# Patient Record
Sex: FEMALE | Race: WHITE | Employment: OTHER | URBAN - METROPOLITAN AREA
[De-identification: names, ages, dates, MRNs, and addresses within clinical notes are randomized per-mention and may not be internally consistent; named-entity substitution may affect disease eponyms.]

---

## 2017-11-14 PROBLEM — E08.40 DIABETES MELLITUS DUE TO UNDERLYING CONDITION WITH DIABETIC NEUROPATHY, WITHOUT LONG-TERM CURRENT USE OF INSULIN (HCC): Status: ACTIVE | Noted: 2017-11-14

## 2017-11-14 PROBLEM — J30.89 ENVIRONMENTAL AND SEASONAL ALLERGIES: Status: ACTIVE | Noted: 2017-11-14

## 2017-11-14 PROBLEM — E11.9 DIABETES MELLITUS WITHOUT COMPLICATION (HCC): Status: ACTIVE | Noted: 2017-11-14

## 2017-12-05 PROBLEM — G62.9 PERIPHERAL POLYNEUROPATHY: Status: ACTIVE | Noted: 2017-12-05

## 2017-12-05 PROBLEM — E66.9 OBESITY (BMI 30-39.9): Status: ACTIVE | Noted: 2017-12-05

## 2017-12-05 PROBLEM — G25.81 RESTLESS LEG SYNDROME: Status: ACTIVE | Noted: 2017-12-05

## 2018-06-11 PROBLEM — M15.9 PRIMARY OSTEOARTHRITIS INVOLVING MULTIPLE JOINTS: Status: ACTIVE | Noted: 2018-06-11

## 2018-11-19 PROBLEM — J45.20 MILD INTERMITTENT ASTHMA WITHOUT COMPLICATION: Status: ACTIVE | Noted: 2018-11-19

## 2019-03-25 PROBLEM — L43.9 LICHEN PLANUS: Status: ACTIVE | Noted: 2019-03-25

## 2019-03-25 PROBLEM — E66.01 SEVERE OBESITY (HCC): Status: ACTIVE | Noted: 2019-03-25

## 2019-07-22 PROBLEM — E08.40 DIABETES MELLITUS DUE TO UNDERLYING CONDITION WITH DIABETIC NEUROPATHY, WITHOUT LONG-TERM CURRENT USE OF INSULIN (HCC): Status: RESOLVED | Noted: 2017-11-14 | Resolved: 2019-07-22

## 2019-07-22 PROBLEM — E11.40 CONTROLLED TYPE 2 DIABETES WITH NEUROPATHY (HCC): Status: ACTIVE | Noted: 2019-07-22

## 2019-07-22 PROBLEM — E11.9 DIABETES MELLITUS WITHOUT COMPLICATION (HCC): Status: RESOLVED | Noted: 2017-11-14 | Resolved: 2019-07-22

## 2019-09-24 PROBLEM — F33.0 MILD EPISODE OF RECURRENT MAJOR DEPRESSIVE DISORDER (HCC): Status: ACTIVE | Noted: 2019-09-24

## 2019-09-24 PROBLEM — F51.04 PSYCHOPHYSIOLOGICAL INSOMNIA: Status: ACTIVE | Noted: 2019-09-24

## 2019-09-24 PROBLEM — F41.1 GAD (GENERALIZED ANXIETY DISORDER): Status: ACTIVE | Noted: 2019-09-24

## 2019-09-24 PROBLEM — G47.33 OBSTRUCTIVE SLEEP APNEA SYNDROME: Status: ACTIVE | Noted: 2019-09-24

## 2019-10-10 PROBLEM — K59.09 CHRONIC CONSTIPATION: Status: ACTIVE | Noted: 2019-10-10

## 2019-10-10 PROBLEM — K21.00 GASTROESOPHAGEAL REFLUX DISEASE WITH ESOPHAGITIS: Status: ACTIVE | Noted: 2019-10-10

## 2019-10-10 PROBLEM — R06.00 DYSPNEA: Status: ACTIVE | Noted: 2019-10-10

## 2019-11-06 PROBLEM — E11.40 TYPE 2 DIABETES MELLITUS WITH DIABETIC NEUROPATHY (HCC): Status: ACTIVE | Noted: 2019-11-06

## 2019-11-11 ENCOUNTER — HOSPITAL ENCOUNTER (OUTPATIENT)
Dept: PHYSICAL THERAPY | Age: 64
Discharge: HOME | End: 2019-11-11
Payer: MEDICARE

## 2019-11-11 DIAGNOSIS — G89.29 CHRONIC RIGHT-SIDED LOW BACK PAIN WITHOUT SCIATICA: ICD-10-CM

## 2019-11-11 DIAGNOSIS — M54.50 CHRONIC RIGHT-SIDED LOW BACK PAIN WITHOUT SCIATICA: ICD-10-CM

## 2019-11-11 PROCEDURE — 97162 PT EVAL MOD COMPLEX 30 MIN: CPT

## 2019-11-11 PROCEDURE — 97110 THERAPEUTIC EXERCISES: CPT

## 2019-11-11 NOTE — THERAPY EVALUATION
Vanessa Etienne  : 1955      [place] / [place] / Product Type: Medicare /  [address] at [address] [place] [address]. [facility], [address], [facility], [address] [place] [address]  [phone]   [phone]       OUTPATIENT PHYSICAL THERAPY:Initial Assessment 2019    ICD-10: Treatment Diagnosis:    Low back pain (M54.5)        Muscle weakness (generalized) (M62.81)  Difficulty in walking, not elsewhere classified (R26.2)     Precautions/Allergies:   Codeine; Dulaglutide; Hydrocodone; Hydrocodone-acetaminophen; Liraglutide; Morphine; and Tramadol   MD Orders: Eval and Treat  MEDICAL/REFERRING DIAGNOSIS:  Chronic right-sided low back pain without sciatica [M54.5, G89.29]   DATE OF ONSET: approx 2 weeks ago  REFERRING PHYSICIAN: Karen Brown MD  RETURN PHYSICIAN APPOINTMENT: TBD by patient     INITIAL ASSESSMENT:  Ms. Vanessa Etienne presents to physical therapy with decreased postural and hip/core strength, ROM, joint mobility, flexibility, functional mobility, and increased pain that had an insidious onset approx 2 months ago. Pt has a history of chronic LBP. Pt has negative results with SLR and prone knee bend, there are no radiating neurological symptoms upon examination. Pt is anxious and hesitant with therapy intervention secondary to previous experience a couple of years ago. Vanessa Etienne will benefit from skilled physical therapy (medically necessary) to address above deficits affecting participation in basic ADLs and functional mobility/tolerance. Patient will benefit from manual therapeutic techniques (stretching, joint mobilizations, soft tissue mobilization/myofascial release), therapeutic exercises and activities to promote postural control and strengthen, education on body mechanics, and comprehensive home exercises program to address current impairments and functional limitations.        PROBLEM LIST (Impacting functional limitations):  1. Decreased Strength  2. Decreased ADL/Functional Activities  3. Decreased Transfer Abilities  4. Decreased Ambulation Ability/Technique  5. Decreased Balance  6. Increased Pain  7. Decreased Activity Tolerance  8. Increased Fatigue  9. Increased Shortness of Breath  10. Decreased Flexibility/Joint Mobility  11. Decreased [place] with Home Exercise Program INTERVENTIONS PLANNED:  1. Balance Exercise  2. Bed Mobility  3. Cold  4. Cryotherapy  5. Electrical Stimulation  6. Family Education  7. Gait Training  8. Heat  9. Home Exercise Program (HEP)  10. Manual Therapy  11. Neuromuscular Re-education/Strengthening  12. Range of Motion (ROM)  13. Therapeutic Activites  14. Therapeutic Exercise/Strengthening  15. Transfer Training  16. Lumbar Traction   TREATMENT PLAN:  Effective Dates: 11/11/19 TO 1/10/2020 (60 days). Frequency/Duration: 2 times a week for 60 Days  GOALS: (Goals have been discussed and agreed upon with patient.)  Short Term Goals 30 days   1. Charleston Bernheim will be independent with HEP to maintain functional gains made with therapy intervention. 2. [place] Bernheim will participate in core stabilization exercises to help with stabilization during ADLs to decreased pain and improve body mechanics. [facility] Ellis will improve hamstring length by greater than or equal 10 degrees to promote dynamic balance and decrease the risk for falls. Long Term Goals 60 days  1. Charleston Bernheim will demonstrate a 8 point improvement on the Oswestry to show improvement in function and participation in household chores  2. [place] Bernheim will report 2/10 pain at rest and during ADLs to complete cooking tasks. [address] [facility] Medrano will demonstrate 4/5 hip abductor strength on manual muscle testing to promote stance limb control with gait and stair negotiation to prevent low back shear.   [address] Schilling will initiate a home walking program with less than or equal to 3/10 LBP to improve overall wellness and decrease pain with mobility. Rehabilitation Potential For Stated Goals: FAIR - sedentary lifestyle may have some fear avoidance behaviors. Outcome Measure: Tool Used: Modified Oswestry Low Back Pain Questionnaire  Score:  Initial: 19/50  Most Recent: X/50 (Date: -- )   Interpretation of Score: Each section is scored on a 0-5 scale, 5 representing the greatest disability. The scores of each section are added together for a total score of 50. Medical Necessity:   · Skilled intervention continues to be required due to above deficits affecting participation in basic ADLs and overall functional tolerance. Reason for Services/Other Comments:  · Patient continues to require skilled intervention due to  above deficits affecting participation in basic ADLs and overall functional tolerance. Total Duration: 40 minutes plus treatment  PT Patient Time In/Time Out  Time In: 0810  Time Out: 0900    Regarding Sagrario Christy's therapy, I certify that the treatment plan above will be carried out by a therapist or under their direction. Thank you for this referral,  Natalie Duarte PT     Referring Physician Signature: Troy Pollack MD              Date                    HISTORY:   History of Present Injury/Illness (Reason for Referral):    Pt reports approx 2 months ago she has onset of muscle spasm pain with no reported mechanism of injury. Pt has a chronic history of LBP, but reports this is a\" new pain\" pt reports burning sensation below waist on both sides and spasms going up between both shoulder blades. Pt reports these spasms come on 3-4 x a day and then she is unable to do anything. Note: Patient denies any saddle paresthesia or bowel/bladder deficits.      · Aggravating factors: standing, quick movement, sitting   · Relieving factors: analgesic cream, massage    -Present symptoms/complaints (on day of evaluation)  Pain Scale:  · Current: 4/10  · Best: 0/10  · Worst: 10/10      Level of Function/Work/Activity:  Current functional level: pt is sedentary, self limits acitivty, pt has help from  to wash back   Prior level of Function: pt reports she had started walking program and lifting light hand weights, household chores  Work/Hobbies: started a walking program several months ago    Previous Treatment Approaches:  None     Past Medical History/Comorbidities:   Ms. Chloé Doshi  has a past medical history of Anemia, Elevated liver enzymes, Exogenous obesity, Hypertension, Other and unspecified hyperlipidemia, Peripheral neuropathy, and Type II or unspecified type diabetes mellitus without mention of complication, not stated as uncontrolled. Ms. Chloé Doshi  has a past surgical history that includes hx hysterectomy (1997). Right rotator cuff tear per patient, R hip pain, history of foot surgery left. Arthritis in B knees and back    Social History/Living Environment:   Pt lives in a one level home with 4 steps to enter with left handrails.  Pt lives with     Dominant Side right  Other Clinical Tests:  maging: NO     Active Ambulatory Problems     Diagnosis Date Noted    Hypertension, essential, benign 10/28/2014    Hyperlipemia, mixed 10/28/2014    Elevated liver enzymes 10/28/2014    Allergic rhinitis 10/28/2014    Environmental and seasonal allergies 11/14/2017    Restless leg syndrome 12/05/2017    Peripheral polyneuropathy 12/05/2017    Obesity (BMI 30-39.9) 12/05/2017    BMI 38.0-38.9,adult 12/05/2017    Primary osteoarthritis involving multiple joints 06/11/2018    Mild intermittent asthma without complication 21/01/5853    Severe obesity ([address] 51/71/5198    Lichen planus 71/74/7885    Controlled type 2 diabetes with neuropathy ([address] 07/22/2019    Mild episode of recurrent major depressive disorder ([address] 09/24/2019    TESS (generalized anxiety disorder) 09/24/2019    Psychophysiological insomnia 09/24/2019    Obstructive sleep apnea syndrome 09/24/2019  Chronic constipation 10/10/2019    Dyspnea 10/10/2019    Gastroesophageal reflux disease with esophagitis 10/10/2019    Type 2 diabetes mellitus with diabetic neuropathy ([facility] 11/06/2019     Resolved Ambulatory Problems     Diagnosis Date Noted    Diabetes mellitus without complication ([facility] 15/92/6036    Diabetes mellitus due to underlying condition with diabetic neuropathy, without long-term current use of insulin ([facility] 11/14/2017     Past Medical History:   Diagnosis Date    Anemia     Exogenous obesity     Hypertension     Other and unspecified hyperlipidemia     Peripheral neuropathy     Type II or unspecified type diabetes mellitus without mention of complication, not stated as uncontrolled        Ambulatory/Rehab Services H2 Model Falls Risk Assessment   Risk Factors:       (2)  Symptomatic Depression       (1)  Dizziness/Vertigo       (1)  Visual Impairment [specify:  difficulty reading despite reading glasses] Ability to Rise from Chair:       (0)  Ability to rise in a single movement   Falls Prevention Plan:       Exercise/Equipment Adaptation (specify):  complete interventions with therapist supervision   Total: (5 or greater = High Risk): 4   ©2010 [facility] of [facility]. All Rights Reserved. [facility] Patent #[id number]. Federal Law prohibits the replication, distribution or use without written permission from [facility] of [address] [place] [address]   Current Medications:       Current Outpatient Medications:     varicella-zoster recombinant, PF, (SHINGRIX, PF,) 50 mcg/0.5 mL susr injection, 0.5mL by IntraMUSCular route once now and then repeat in 2-6 months, Disp: 0.5 mL, Rfl: 1    busPIRone (BUSPAR) 10 mg tablet, Take 1 Tab by mouth three (3) times daily. , Disp: 90 Tab, Rfl: 0    baclofen (LIORESAL) 20 mg tablet, TAKE ONE TABLET BY MOUTH EACH NIGHT AT BEDTIME, Disp: 30 Tab, Rfl: 0    traZODone (DESYREL) 100 mg tablet, Take 1 Tab by mouth nightly., Disp: 30 Tab, Rfl: 0    pantoprazole (PROTONIX) 40 mg tablet, Take 1 Tab by mouth daily. , Disp: 30 Tab, Rfl: 0    famotidine (PEPCID) 40 mg tablet, Take 1 Tab by mouth nightly., Disp: 30 Tab, Rfl: 0    insulin NPH (NOVOLIN N NPH U-100 INSULIN) 100 unit/mL injection, by SubCUTAneous route once., Disp: , Rfl:     ospemifene (OSPHENA) 60 mg tab tablet, Take 60 mg by mouth., Disp: , Rfl:     Omega-3 Fatty Acids 60- mg cpDR, Take  by mouth., Disp: , Rfl:     linaCLOtide (LINZESS) 145 mcg cap capsule, Take 1 Cap by mouth Daily (before breakfast). , Disp: 90 Cap, Rfl: 1    gabapentin (NEURONTIN) 400 mg capsule, Take 2 capsules po 3 times a day, Disp: 540 Cap, Rfl: 1    losartan (COZAAR) 100 mg tablet, Take 1 Tab by mouth daily. , Disp: 90 Tab, Rfl: 1    rOPINIRole (REQUIP) 1 mg tablet, Take 1 tab po twice a day, Disp: 180 Tab, Rfl: 1    rosuvastatin (CRESTOR) 20 mg tablet, TAKE ONE TABLET BY MOUTH ONCE DAILY. , Disp: 90 Tab, Rfl: 1    albuterol (PROVENTIL HFA, VENTOLIN HFA, PROAIR HFA) 90 mcg/actuation inhaler, Take 2 Puffs by inhalation every six (6) hours as needed for Wheezing., Disp: 1 Inhaler, Rfl: 2    fluticasone propion-salmeterol (ADVAIR HFA) 230-21 mcg/actuation inhaler, Take 2 Puffs by inhalation two (2) times a day., Disp: 1 Each, Rfl: 5    hydroxychloroquine (PLAQUENIL) 200 mg tablet, Take  by mouth., Disp: , Rfl:     glipiZIDE (GLUCOTROL) 5 mg tablet, Take 1 Tab by mouth two (2) times a day., Disp: 60 Tab, Rfl: 0    metFORMIN (GLUCOPHAGE) 1,000 mg tablet, Take 1 Tab by mouth two (2) times daily (with meals). , Disp: 180 Tab, Rfl: 1    aspirin delayed-release 81 mg tablet, Take 81 mg by mouth., Disp: , Rfl:     insulin regular (NOVOLIN R) 100 unit/mL injection, 30 units sub-cu TID per sliding scale, Disp: 1 Vial, Rfl: 5   Date Last Reviewed:  11/11/2019          Number of Personal Factors/Comorbidities that affect the Plan of Care: 1-2: MODERATE COMPLEXITY   EXAMINATION:   Observation/Orthostatic Postural Assessment: Standing: Forward Head  Rounded Shoulders  Thoracic Kyphosis        Sitting:  Forward Head  Rounded Shoulders  Thoracic Kyphosis    Palpation and Joint Mobility:   tenderness to  at L4, L1, T11, T7, T3, and C7    ROM:       AROM/PROM         Joint: Eval Date: 11/11/19  Re-Assess Date:  Re-Assess Date:    Active ROM RIGHT LEFT RIGHT LEFT RIGHT LEFT   Hip Flexion No restrictions No restrictions       Hip Extension 5 dg 5dg       Hip Internal Rotation No restrictions No restrictions       Knee Flexion No restrictions No restrictions       Knee Extension No restrictions No restrictions       Lumbar Flexion hypomobility hypomobility       Lumbar Extension hypomobility hypomobility       Lumbar Side-bending hypomobility hypomobility       Lumbar Rotation hypomobility hypomobility         Strength:     Eval Date: 11/11/19  Re-Assess Date:  Re-Assess Date:      RIGHT LEFT RIGHT LEFT RIGHT LEFT   Knee Flexion (L5-S2) 5/5 5/5       Knee Extension (L3, L4) 4+/5 4+/5       Hip Flexion (L1, L2) 4+/5 4+/5       Hip Extension 4+/5 4+/5       Hip Abduction (L5, S1) 3+/5 3+/5       Hip External Rotation 4/5 4/5       Ankle Dorsiflexion  4+/5 4/5       Strength:  0-5 scale, 0 no muscle contraction; 1 no joint motion but contraction felt; 2 -less than full ROM in gravity eliminated; 2 full ROM in grav eliminated; 2+ full ROM in grav eliminated and sharir to withstand minimal resist; 3-less than full ROM against gravity; 3 full ROM against gravity;  3+ full ROM against gravity and able to withstand minimal resist; 4- full ROM against gravity and able to withstand less than mod resist; 4 full ROM against gravity and able to withstand mod resist; 5 full ROM against gravity and able to withstand max resist.     Hamstring length from 90/90:   RIGHT LE: 40 dg   LEFT LE: 45 dg    Prone Rectus Femoris Length:              RIGHT LE: 120 dg   LEFT LE: 110 dg    Special Tests:   · BETTY: negative  · SLR (<60 degrees): negative  · Prone Knee bend: negative  Neurological Screen:    RADIATING SYMPTOMS?: NONE with  to all lumbar and thoracic segements, denies any changes in sensation with dermatome testing    Functional Mobility:  Affecting participation in basic ADLs and functional tasks. Gait/Ambulation:  No significant deficits, decreased speed, lack of trunk rotation        Transfers:  Not impaired        Bed Mobility:  Not impaired other than increased caution        Stairs:  Not assessed      Body Structures Involved:  1. Bones  2. Joints  3. Muscles  4. Ligaments Body Functions Affected:  1. Sensory/Pain  2. Neuromusculoskeletal  3. Movement Related Activities and Participation Affected:  1. Mobility  2.  Self Care   Number of elements that affect the Plan of Care: 3: MODERATE COMPLEXITY   CLINICAL PRESENTATION:   Presentation: Evolving clinical presentation with changing clinical characteristics: MODERATE COMPLEXITY   CLINICAL DECISION MAKING:      Use of outcome tool(s) and clinical judgement create a POC that gives a: Questionable prediction of patient's progress: MODERATE COMPLEXITY

## 2019-11-11 NOTE — PROGRESS NOTES
[facility]  : 1955  [place]  Secondary:  [facility] [address]  [facility]. [address] [address], [address] [address]  [phone]   [phone]    Visit Count:  1   OUTPATIENT PHYSICAL THERAPY:  Daily Treatment Note  2019   ICD-10: Treatment Diagnosis: Low back pain (M54.5)        Muscle weakness (generalized) (M62.81)  Difficulty in walking, not elsewhere classified (R26.2)     Precautions/Allergies:   Codeine; Dulaglutide; Hydrocodone; Hydrocodone-acetaminophen; Liraglutide; Morphine; and Tramadol   MD Orders: Eval andTreat MEDICAL/REFERRING DIAGNOSIS:  Chronic right-sided low back pain without sciatica [M54.5, G89.29]   DATE OF ONSET: approx 2 months ago  REFERRING PHYSICIAN: Jamie Cerna MD  RETURN PHYSICIAN APPOINTMENT: TBD by patient       Pre-treatment Symptoms/Complaints:  Please see eval  Pain: Initial:   4/10 LBP Post Session:  4/10   Medications Last Reviewed:  19  Updated Objective Findings:  Please see eval     TREATMENT:     THERAPEUTIC EXERCISE: (10 minutes):  Exercises per grid below to improve mobility, strength and balance. Required minimal visual, verbal, manual and tactile cues to promote proper body alignment, promote proper body posture and promote proper body mechanics. Progressed resistance, range, repetitions and complexity of movement as indicated. Date:  2019   Date:   Date:     Activity/Exercise Parameters Parameters Parameters   Education  Therapist provides skilled education on role of therapy, POC, and new HEP issued     Left/Right Lower trunk rotation 10x     Left/Right Clams 15x                               MANUAL THERAPY: (0 minutes): Joint mobilization and Soft tissue mobilization was utilized and necessary because of the patient's restricted joint motion and painful spasm.      [facility]  Treatment/Session Summary:    · Response to Treatment:  pt returns demo and HEP and in agreement with POC. .  · Communication/Consultation:  Eval sent to MD  · Equipment provided today:  pt provided with HEP        Recommendations/Intent for next treatment session: Next visit will focus on initiating core strength and stretching program.      Treatment Plan of Care Effective Dates:  11/11/19 to 1/10/20        Total Treatment Billable Duration:  10 min plus eval  PT Patient Time In/Time Out  Time In: 0810  Time Out: 0900  Chano Ortiz PT    Future Appointments   Date Time Provider Barrie Lynne   11/13/2019 11:15 AM Anna Clamp, PT [facility]   11/19/2019  8:00 AM Anna Clamp, PT [facility]   11/21/2019  1:45 PM Anna Clamp, PT [facility]   11/25/2019  8:00 AM Anna Clamp, PT [facility]   11/27/2019  1:45 PM Anna Clamp, PT [facility]   12/2/2019  8:00 AM Anna Clamp, PT [facility]   12/4/2019  8:45 AM Anna Clamp, PT [facility]   12/5/2019  7:45 AM Jamie Cerna MD [facility]   12/9/2019  8:00 AM Anna Clamp, PT [facility]   12/11/2019  8:00 AM Anna Clamp, PT [facility]   2/11/2020  9:20 AM Garett Jenkins MD [id number] [place]

## 2019-11-13 ENCOUNTER — HOSPITAL ENCOUNTER (OUTPATIENT)
Dept: PHYSICAL THERAPY | Age: 64
Discharge: HOME | End: 2019-11-13
Payer: MEDICARE

## 2019-11-13 PROCEDURE — 97110 THERAPEUTIC EXERCISES: CPT

## 2019-11-19 ENCOUNTER — HOSPITAL ENCOUNTER (OUTPATIENT)
Dept: PHYSICAL THERAPY | Age: 64
Discharge: HOME | End: 2019-11-19
Payer: MEDICARE

## 2019-11-19 PROCEDURE — 97110 THERAPEUTIC EXERCISES: CPT

## 2019-11-19 NOTE — PROGRESS NOTES
Avis Alford  : 1955  [place]  Secondary:  [facility] [address]  [address]. [address], [address]  [phone]   [phone]    Visit Count:  3   OUTPATIENT PHYSICAL THERAPY:  Daily Treatment Note  2019   ICD-10: Treatment Diagnosis: Low back pain (M54.5)        Muscle weakness (generalized) (M62.81)  Difficulty in walking, not elsewhere classified (R26.2)     Precautions/Allergies:   Codeine; Dulaglutide; Hydrocodone; Hydrocodone-acetaminophen; Liraglutide; Morphine; and Tramadol   MD Orders: Morena andTreat MEDICAL/REFERRING DIAGNOSIS:  Chronic right-sided low back pain without sciatica   DATE OF ONSET: approx 2 months ago  REFERRING PHYSICIAN: Evan Guevara MD  RETURN PHYSICIAN APPOINTMENT: TBD by patient       Pre-treatment Symptoms/Complaints: pt with no specific functional changes. Pt is having health issues with . Pain: Initial:   3/10 LBP Post Session:  3/10 LBP   Medications Last Reviewed:  19  Updated Objective Findings:  None today. Pt is highly private and prefers to not be in open gym when busy     TREATMENT:     THERAPEUTIC EXERCISE: (45 minutes):  Exercises per grid below to improve mobility, strength and balance. Required minimal visual, verbal, manual and tactile cues to promote proper body alignment, promote proper body posture and promote proper body mechanics. Progressed resistance, range, repetitions and complexity of movement as indicated. Date:  2019   Date:  19 Date:  19   Activity/Exercise Parameters Parameters Parameters   Education  Therapist provides skilled education on role of therapy, POC, and new HEP issued Therapist provides skilled education on keeping an activity log and recommendation for activity goals with use of RPE scale. Pt verbalizes understanding.     Left/Right Lower trunk rotation 10x 10 x 10 sec 10 x 10 sec   Left/Right Clams 15x 15x 2 x10 reps hooklying lumbar flex and ext  2 x 10 reps    Hamstring stretch  Left/right 3 x 30 sec Left/right  3 x 30 sec   Piriformis stretch  Left/right,   3 x 30 sec Left/right  [id number]   Sit to stand  10 x 15x with 5lb wt   Treadmill walking  2.0 mph  X 6 min  RPE 4-5 2.0mph  X 8 min  RPE 5   Bridge   2 x 10 reps   Isometric standing resisted trunk rotation. 10 x left/right   Mini squat with lift   5lb wt  2 x 10 reps     MANUAL THERAPY: (0 minutes): Joint mobilization and Soft tissue mobilization was utilized and necessary because of the patient's restricted joint motion and painful spasm. [employer] [facility]  Treatment/Session Summary:    · Response to Treatment: pt with improved activity tolerance as able to complete 8 min on treadmill. Pt is able to complete sit to stand with 5 lb wt and no significant mechanical deviations  · Communication/Consultation: none today  · Equipment provided today:  None today. Recommendations/Intent for next treatment session: Next visit will focus on lifting from floor.     Treatment Plan of Care Effective Dates:  11/11/19 to 1/10/20        Total Treatment Billable Duration:  45 min plus eval  PT Patient Time In/Time Out  Time In: 0800  Time Out: 0845  Jose Angel Lafleur PT    Future Appointments   Date Time Provider Barrie Lynne   11/21/2019  1:45 PM Zaira Lemus, PT [facility]   11/25/2019  8:00 AM [facility]   11/27/2019  1:45 PM [facility]   12/2/2019  8:00 AM [place], PT [facility]   12/4/2019  8:45 AM Zaira [place], [facility]   12/5/2019  7:45 AM Caesar Sparks MD [facility]   12/11/2019  8:00 AM Zair[facility] [place]   2/11/2020  9:20 AM Clint Landin MD [id number] [place]

## 2019-11-21 ENCOUNTER — HOSPITAL ENCOUNTER (OUTPATIENT)
Dept: PHYSICAL THERAPY | Age: 64
Discharge: HOME | End: 2019-11-21
Payer: MEDICARE

## 2019-11-21 PROCEDURE — 97110 THERAPEUTIC EXERCISES: CPT

## 2019-11-21 NOTE — PROGRESS NOTES
Lalo Dominguez  : 1955  [place]  Secondary:  [facility]  [address]. [place] [address], [address]  [phone]   [phone]    Visit Count:  4   OUTPATIENT PHYSICAL THERAPY:  Daily Treatment Note  2019   ICD-10: Treatment Diagnosis: Low back pain (M54.5)        Muscle weakness (generalized) (M62.81)  Difficulty in walking, not elsewhere classified (R26.2)     Precautions/Allergies:   Codeine; Dulaglutide; Hydrocodone; Hydrocodone-acetaminophen; Liraglutide; Morphine; and Tramadol   MD Orders: Morena andTreat MEDICAL/REFERRING DIAGNOSIS:  Chronic right-sided low back pain without sciatica   DATE OF ONSET: approx 2 months ago  REFERRING PHYSICIAN: Nathaniel Gaytan MD  RETURN PHYSICIAN APPOINTMENT: TBD by patient       Pre-treatment Symptoms/Complaints:I have been a little sore on the right side. \" pt reports doing exercises at home with . Pain: Initial:   4/10 LBP on R side Post Session:  4/10 LBP   Medications Last Reviewed:  19  Updated Objective Findings:  None today. Pt is highly private and prefers to not be in open gym when busy     TREATMENT:     THERAPEUTIC EXERCISE: (45 minutes):  Exercises per grid below to improve mobility, strength and balance. Required minimal visual, verbal, manual and tactile cues to promote proper body alignment, promote proper body posture and promote proper body mechanics. Progressed resistance, range, repetitions and complexity of movement as indicated. Date:  2019   Date:  19 Date:  19 Date:  19   Activity/Exercise Parameters Parameters Parameters Parameters   Education  Therapist provides skilled education on role of therapy, POC, and new HEP issued Therapist provides skilled education on keeping an activity log and recommendation for activity goals with use of RPE scale. Pt verbalizes understanding.      Left/Right Lower trunk rotation 10x 10 x 10 sec 10 x 10 sec    Left/Right Clams 15x 15x 2 x10 reps 2 x 10 reps   hooklying lumbar flex and ext  2 x 10 reps     Hamstring stretch  Left/right 3 x 30 sec Left/right  3 x 30 sec    Piriformis stretch  Left/right,   3 x 30 sec Left/right  [id number]    Sit to stand  10 x 15x with 5lb wt 10 x with 5lb wt   Treadmill walking  2.0 mph  X 6 min  RPE 4-5 2.0mph  X 8 min  RPE 5 2. 2mph  X 10 min  RPE 5   Bridge   2 x 10 reps 2 x 10 reps  5lb weight on abdomen   Isometric standing resisted trunk rotation. 10 x left/right    Mini squat with lift   5lb wt  2 x 10 reps 5lb wt  2 x 10 reps   S/L hip abduction (left/right)    2 x 10 reps   [place] twist    2 x 10 reps     MANUAL THERAPY: (0 minutes): Joint mobilization and Soft tissue mobilization was utilized and necessary because of the patient's restricted joint motion and painful spasm. [facility] Portal  Treatment/Session Summary:    · Response to Treatment: pt has shown a steady gain in overall activity level as able to complete 10 min of treadmill walking today and same RPE as previous sessions. Pt continues to have anxiety with social scenarios and desires increased privacy. · Communication/Consultation: none today  · Equipment provided today:  None today. Recommendations/Intent for next treatment session: Next visit will focus on lifting from floor.     Treatment Plan of Care Effective Dates:  11/11/19 to 1/10/20      Total Treatment Billable Duration:  45 min   PT Patient Time In/Time Out  Time In: 1345  Time Out: [address] Blank PT    Future Appointments   Date Time Provider Barrie Lynne   11/25/2019  8:00 AM [facility]uc, [facility]   11/27/2019  1:45 PM Melissa Relic, PT [facility]   12/2/2019  8:00 AM Melissa Relic, PT [facility]   12/4/2019  8:45 AM Melissa Relic, PT [facility]   12/5/2019  7:45 AM Brent Magana MD [facility]   12/11/2019  8:00 AM [facility] 2/11/2020  9:20 AM Ramesh Carrillo MD [id number] [place]

## 2019-11-25 ENCOUNTER — HOSPITAL ENCOUNTER (OUTPATIENT)
Dept: PHYSICAL THERAPY | Age: 64
Discharge: HOME | End: 2019-11-25
Payer: MEDICARE

## 2019-11-25 PROCEDURE — 97110 THERAPEUTIC EXERCISES: CPT

## 2019-11-25 NOTE — PROGRESS NOTES
Liz Mendes  : 1955  [place]  Secondary:  [facility]  [address]. [place] [address], [address]  [phone]   [phone]    Visit Count:  5   OUTPATIENT PHYSICAL THERAPY:  Daily Treatment Note  2019   ICD-10: Treatment Diagnosis: Low back pain (M54.5)        Muscle weakness (generalized) (M62.81)  Difficulty in walking, not elsewhere classified (R26.2)     Precautions/Allergies:   Codeine; Dulaglutide; Hydrocodone; Hydrocodone-acetaminophen; Liraglutide; Morphine; and Tramadol   MD Orders: Eval andTreat MEDICAL/REFERRING DIAGNOSIS:  Chronic right-sided low back pain without sciatica   DATE OF ONSET: approx 2 months ago  REFERRING PHYSICIAN: Brandi Herrera MD  RETURN PHYSICIAN APPOINTMENT: TBD by patient       Pre-treatment Symptoms/Complaints:\"I am not feeling so good today. \"  Pain: Initial:   4/10 LBP on R side Post Session:  4/10 LBP   Medications Last Reviewed:  19  Updated Objective Findings:  None today. Pt is highly private and prefers to not be in open gym when busy     TREATMENT:     THERAPEUTIC EXERCISE: (45 minutes):  Exercises per grid below to improve mobility, strength and balance. Required minimal visual, verbal, manual and tactile cues to promote proper body alignment, promote proper body posture and promote proper body mechanics. Progressed resistance, range, repetitions and complexity of movement as indicated. Date:  2019   Date:  19 Date:  19 Date:  19 Date:  19   Activity/Exercise Parameters Parameters Parameters Parameters    Education  Therapist provides skilled education on role of therapy, POC, and new HEP issued Therapist provides skilled education on keeping an activity log and recommendation for activity goals with use of RPE scale. Pt verbalizes understanding.       Left/Right Lower trunk rotation 10x 10 x 10 sec 10 x 10 sec  10 x 10 sec   Left/Right Clams 15x 15x 2 x10 reps 2 x 10 reps 2 x 10 reps   hooklying lumbar flex and ext  2 x 10 reps      Hamstring stretch  Left/right 3 x 30 sec Left/right  3 x 30 sec  Left/right  3 x 30sec   Piriformis stretch  Left/right,   3 x 30 sec Left/right  [id number]  Left/right  3 x 30sec   Sit to stand  10 x 15x with 5lb wt 10 x with 5lb wt 15x with 5 lb wt   Treadmill walking  2.0 mph  X 6 min  RPE 4-5 2.0mph  X 8 min  RPE 5 2. 2mph  X 10 min  RPE 5 2. 2mph  X 11 min  RPE 5   Bridge   2 x 10 reps 2 x 10 reps  5lb weight on abdomen 2 x 10 reps  5lb weight on abdomen   Isometric standing resisted trunk rotation. 10 x left/right     Mini squat with lift   5lb wt  2 x 10 reps 5lb wt  2 x 10 reps    S/L hip abduction (left/right)    2 x 10 reps    [place] twist    2 x 10 reps    Sciatic nerve glide     2 x 10 reps   Hip hinge     10 x 10 sec     MANUAL THERAPY: (0 minutes): Joint mobilization and Soft tissue mobilization was utilized and necessary because of the patient's restricted joint motion and painful spasm. [facility] Portal  Treatment/Session Summary:    · Response to Treatment: pt with increased irritability to right side today requiring increased stretching and encouragement. Pt responds well to stretching and demos improved standing /walking tolerance to 11 min v 6 min on 11/13/19  · Communication/Consultation: none today  · Equipment provided today:  None today. Recommendations/Intent for next treatment session: Next visit will focus on lifting from floor and use of dowel for improved spinal alignment.     Treatment Plan of Care Effective Dates:  11/11/19 to 1/10/20      Total Treatment Billable Duration:  45 min   PT Patient Time In/Time Out  Time In: 0800  Time Out: 0845  Gary Blair PT    Future Appointments   Date Time Provider Barrie Lynne   11/27/2019  1:45 PM Dalila Kang PT [facility] AND [facility]   12/2/2019  8:00 AM [facility]   12/4/2019  8:45 AM Dalila Kang PT [facility]   12/5/2019  7:45 AM Donna Perdomo MD [facility]   12/11/2019  8:00 AM Donita Blankenship PT [facility]   2/11/2020  9:20 AM Goyo Martinez MD [facility]

## 2019-11-27 ENCOUNTER — APPOINTMENT (OUTPATIENT)
Dept: PHYSICAL THERAPY | Age: 64
End: 2019-11-27
Payer: MEDICARE

## 2019-12-02 ENCOUNTER — HOSPITAL ENCOUNTER (OUTPATIENT)
Dept: PHYSICAL THERAPY | Age: 64
Discharge: HOME | End: 2019-12-02
Payer: MEDICARE

## 2019-12-02 PROCEDURE — 97110 THERAPEUTIC EXERCISES: CPT

## 2019-12-02 NOTE — PROGRESS NOTES
James Maria  : 1955  [place]  Secondary:  [facility] [place] [address], [address]  [phone]   [phone]    Visit Count:  6   OUTPATIENT PHYSICAL THERAPY:  Daily Treatment Note  2019   ICD-10: Treatment Diagnosis: Low back pain (M54.5)        Muscle weakness (generalized) (M62.81)  Difficulty in walking, not elsewhere classified (R26.2)     Precautions/Allergies:   Codeine; Dulaglutide; Hydrocodone; Hydrocodone-acetaminophen; Liraglutide; Morphine; and Tramadol   MD Orders: Morena andTreat MEDICAL/REFERRING DIAGNOSIS:  Chronic right-sided low back pain without sciatica   DATE OF ONSET: approx 2 months ago  REFERRING PHYSICIAN: Jose Hurley MD  RETURN PHYSICIAN APPOINTMENT: TBD by patient       Pre-treatment Symptoms/Complaints:\"I am not feeling so good today. I feel weak and my throat hurts. Pain with lying on left side. \"  Pain: Initial:   does not rate,  \"my back feels pretty good\" Post Session:  Does not rate. Pt reports \"whole body ache\"   Medications Last Reviewed:  19  Updated Objective Findings:  None today. Pt is highly private and prefers to not be in open gym when busy     TREATMENT:     THERAPEUTIC EXERCISE: (45 minutes):  Exercises per grid below to improve mobility, strength and balance. Required minimal visual, verbal, manual and tactile cues to promote proper body alignment, promote proper body posture and promote proper body mechanics. Progressed resistance, range, repetitions and complexity of movement as indicated.    Date:  2019   Date:  19 Date:  19 Date:  19 Date:  19 Date:  19   Activity/Exercise Parameters Parameters Parameters Parameters     Education  Therapist provides skilled education on role of therapy, POC, and new HEP issued Therapist provides skilled education on keeping an activity log and recommendation for activity goals with use of RPE scale. Pt verbalizes understanding. Left/Right Lower trunk rotation 10x 10 x 10 sec 10 x 10 sec  10 x 10 sec 10 x 10 sec   Left/Right Clams 15x 15x 2 x10 reps 2 x 10 reps 2 x 10 reps    hooklying lumbar flex and ext  2 x 10 reps       Hamstring stretch  Left/right 3 x 30 sec Left/right  3 x 30 sec  Left/right  3 x 30sec Left/right  3 x 30sec   Piriformis stretch  Left/right,   3 x 30 sec Left/right  [id number]  Left/right  3 x 30sec Left/right  3 x 30sec   Sit to stand  10 x 15x with 5lb wt 10 x with 5lb wt 15x with 5 lb wt 15x  With over head reach with 5 lb wt   Treadmill walking  2.0 mph  X 6 min  RPE 4-5 2.0mph  X 8 min  RPE 5 2. 2mph  X 10 min  RPE 5 2. 2mph  X 11 min  RPE 5 2.0 mph  X 11 min  RPE 5   Bridge   2 x 10 reps 2 x 10 reps  5lb weight on abdomen 2 x 10 reps  5lb weight on abdomen 2 x 10 reps with add squeeze   Isometric standing resisted trunk rotation. 10 x left/right      Mini squat with lift   5lb wt  2 x 10 reps 5lb wt  2 x 10 reps  5lb wt from 26in surface  2 x 10 reps   S/L hip abduction (left/right)    2 x 10 reps     [place] twist    2 x 10 reps     Sciatic nerve glide     2 x 10 reps 2 x 10 reps   Reece carry      3lb  10 x 25ft   Step up      Left/right  15x each   Hip hinge     10 x 10 sec      MANUAL THERAPY: (0 minutes): Joint mobilization and Soft tissue mobilization was utilized and necessary because of the patient's restricted joint motion and painful spasm. [employer] Portal  Treatment/Session Summary:    · Response to Treatment: pt requires increased encouragement today as pt with anxiety for  health state and pt starting to feel ill. Pt able to progress sit to stand with upward reach and has initiated more functional strengthening indicating improved muscle recruitment and activity tolerance. · Communication/Consultation: none today  · Equipment provided today:  None today.         Recommendations/Intent for next treatment session: Next visit will focus on lifting from floor and use of dowel for improved spinal alignment, carry weight.     Treatment Plan of Care Effective Dates:  11/11/19 to 1/10/20      Total Treatment Billable Duration:  45 min   PT Patient Time In/Time Out  Time In: 7267  Time Out: Kelli Van 318, PT    Future Appointments   Date Time Provider Barrie Lynne   12/4/2019  8:45 AM Priti Whitman, PT [facility]   12/5/2019  7:45 AM Rochelle Sinha MD [facility]   12/11/2019  8:00 AM Priti Whitman, PT [facility]   2/11/2020  9:20 AM Lenore Farrell MD [id number] [place]

## 2019-12-04 ENCOUNTER — HOSPITAL ENCOUNTER (OUTPATIENT)
Dept: PHYSICAL THERAPY | Age: 64
Discharge: HOME | End: 2019-12-04
Payer: MEDICARE

## 2019-12-04 NOTE — PROGRESS NOTES
Jesse Mcleod  : 1955  [place]  Secondary:  [facility] [place] [address]. [place], [address]  [phone]   [phone]        OUTPATIENT DAILY NOTE    NAME/AGE/GENDER: Jesse Mcleod is a 60 y.o. female. DATE: 2019    Ms. Hammad Vasquez for today's appointment due to sickness.     Torres Tubbs, PT

## 2019-12-09 ENCOUNTER — APPOINTMENT (OUTPATIENT)
Dept: PHYSICAL THERAPY | Age: 64
End: 2019-12-09
Payer: MEDICARE

## 2019-12-11 ENCOUNTER — HOSPITAL ENCOUNTER (OUTPATIENT)
Dept: PHYSICAL THERAPY | Age: 64
Discharge: HOME | End: 2019-12-11
Payer: MEDICARE

## 2019-12-11 PROCEDURE — 97110 THERAPEUTIC EXERCISES: CPT

## 2019-12-11 NOTE — THERAPY DISCHARGE
Caprice Marley  : 1955      [place]: [place] / Product Type: Medicare /  Keri Castillo at [address] [place] [address]. [facility], [address] [place] [address]  [phone]   [phone]       OUTPATIENT PHYSICAL THERAPY:Discharge 2019    ICD-10: Treatment Diagnosis:    Low back pain (M54.5)        Muscle weakness (generalized) (M62.81)  Difficulty in walking, not elsewhere classified (R26.2)     Precautions/Allergies:   Codeine; Dulaglutide; Hydrocodone; Hydrocodone-acetaminophen; Liraglutide; Morphine; and Tramadol   MD Orders: Eval and Treat  MEDICAL/REFERRING DIAGNOSIS:  Chronic right-sided low back pain without sciatica   DATE OF ONSET: approx 2 weeks ago  REFERRING PHYSICIAN: Keith Mtz MD  RETURN PHYSICIAN APPOINTMENT: TBD by patient     Discharge ASSESSMENT:  Ms. Caprice Marley Has met 3/3 STGS and 2/4 LTGs over course of therpay intervention focused on development of a HEP, initiation of walking program to improve postural control, stability and overall wellness, pain management techniques, and improving strength and ROM. Pt reports no muscle spasm since starting therapy and an overall improvement in strength, activity capacity and ability to complete self care and household chores with less pain. Pt had a 4 point improvement on Oswestry since initial eval. Pt is adherent to daily exercises program and has initiated a walking program to maintain functional gains. Pt no longer requires skilled therapy services. PT POC closed. TREATMENT PLAN:  Effective Dates: 19 TO 1/10/2020 (60 days). Frequency/Duration: 2 times a week for 60 Days  GOALS: (Goals have been discussed and agreed upon with patient.)  Short Term Goals 30 days   1. Caprice Marley will be independent with HEP to maintain functional gains made with therapy intervention. - GOAL MET 19  2.  Caprice Marley will participate in core stabilization exercises to help with stabilization during ADLs to decreased pain and improve body mechanics. - GOAL MET 12/11/19  3. Lalo Dominguez will improve hamstring length by greater than or equal 10 degrees to promote dynamic balance and decrease the risk for falls. - GOAL MET 12/11/19    Long Term Goals 60 days  1. Lalo Dominguez will demonstrate a 8 point improvement on the Oswestry to show improvement in function and participation in household chores - NOT MET, improves score by 4 points  4. Lalo Dominguez will report 2/10 pain at rest and during ADLs to complete cooking tasks. - GOAL MET 12/11/19  2. Lalo Dominguez will demonstrate 4/5 hip abductor strength on manual muscle testing to promote stance limb control with gait and stair negotiation to prevent low back shear.  - NOT MET, See below for objective measures  5. Lalo Dominguez will initiate a home walking program with less than or equal to 3/10 LBP to improve overall wellness and decrease pain with mobility. - GOAL MET 12/11/19      Outcome Measure: Tool Used: Modified Oswestry Low Back Pain Questionnaire  Score:  Initial: 19/50  Most Recent: 15/50 (Date: 12/11/19 )   Interpretation of Score: Each section is scored on a 0-5 scale, 5 representing the greatest disability. The scores of each section are added together for a total score of 50.       Total Duration: 40 min  PT Patient Time In/Time Out  Time In: 9097  Time Out: 3789    Thank you for this referral,  Romero Raines, PT     Referring Physician Signature: Nathaniel Gaytan MD  No signature required            EXAMINATION:     Strength:     Eval Date: 11/11/19  Re-Assess Date: 12/11/19      RIGHT LEFT RIGHT LEFT   Knee Flexion (L5-S2) 5/5 5/5  5/5 5/5   Knee Extension (L3, L4) 4+/5 4+/5 5/5 5/5   Hip Flexion (L1, L2) 4+/5 4+/5 4+/5 4+/5   Hip Extension 4+/5 4+/5 4+/5 4+/5   Hip Abduction (L5, S1) 3+/5 3+/5 4-/5 4/5   Hip External Rotation 4/5 4/5 4/5 4+/5   Ankle Dorsiflexion  4+/5 4/5 4+/5 4+/5   Strength:  0-5 scale, 0 no muscle contraction; 1 no joint motion but contraction felt; 2 -less than full ROM in gravity eliminated; 2 full ROM in grav eliminated; 2+ full ROM in grav eliminated and sharri to withstand minimal resist; 3-less than full ROM against gravity; 3 full ROM against gravity;  3+ full ROM against gravity and able to withstand minimal resist; 4- full ROM against gravity and able to withstand less than mod resist; 4 full ROM against gravity and able to withstand mod resist; 5 full ROM against gravity and able to withstand max resist.     Hamstring length from 90/90:  EVAL DAY:   RIGHT LE: 40 dg  LEFT LE: 45 dg    On 12/11/19: 60dg bilateral LE

## 2019-12-11 NOTE — PROGRESS NOTES
Washington Basilio  : 1955  [place]  Secondary:  [facility]  [address]. [place] [address], [address]  [phone]   [phone]    Visit Count:  7   OUTPATIENT PHYSICAL THERAPY:  Daily Treatment Note  2019   ICD-10: Treatment Diagnosis: Low back pain (M54.5)        Muscle weakness (generalized) (M62.81)  Difficulty in walking, not elsewhere classified (R26.2)     Precautions/Allergies:   Codeine; Dulaglutide; Hydrocodone; Hydrocodone-acetaminophen; Liraglutide; Morphine; and Tramadol   MD Orders: Eval andTreat MEDICAL/REFERRING DIAGNOSIS:  Chronic right-sided low back pain without sciatica   DATE OF ONSET: approx 2 months ago  REFERRING PHYSICIAN: Sangita Lockett MD  RETURN PHYSICIAN APPOINTMENT: TBD by patient       Pre-treatment Symptoms/Complaints:\"I am feeling so much better, I have not had any muscle spasms recently. .\"  Pain: Initial:   0/10 Post Session:  0/10   Medications Last Reviewed:  19  Updated Objective Findings:  Please see d/c summary for details     TREATMENT:     THERAPEUTIC EXERCISE: (40 minutes):  Exercises per grid below to improve mobility, strength and balance. Required minimal visual, verbal, manual and tactile cues to promote proper body alignment, promote proper body posture and promote proper body mechanics. Progressed resistance, range, repetitions and complexity of movement as indicated. Date:  2019   Date:  19 Date:  19 Date:  19 Date:  19 Date:  19 Date:  19   Activity/Exercise Parameters Parameters Parameters Parameters      Education  Therapist provides skilled education on role of therapy, POC, and new HEP issued Therapist provides skilled education on keeping an activity log and recommendation for activity goals with use of RPE scale. Pt verbalizes understanding.      Admin and scoring of Oswestry by therapist to determine functional gains and impact on ADLs/IADLs. Assessment of ROM and strength by therapist.   Therapist provides skilled education on importance of continuing HEP and walking program to promote health, pain relief, and improve postural control with pt verbalizing understanding. Left/Right Lower trunk rotation 10x 10 x 10 sec 10 x 10 sec  10 x 10 sec 10 x 10 sec 10 x 10 sec   Left/Right Clams 15x 15x 2 x10 reps 2 x 10 reps 2 x 10 reps     hooklying lumbar flex and ext  2 x 10 reps        Hamstring stretch  Left/right 3 x 30 sec Left/right  3 x 30 sec  Left/right  3 x 30sec Left/right  3 x 30sec Left/right  3 x 30sec   Piriformis stretch  Left/right,   3 x 30 sec Left/right  [id number]  Left/right  3 x 30sec Left/right  3 x 30sec Left/right  3 x 30sec   Sit to stand  10 x 15x with 5lb wt 10 x with 5lb wt 15x with 5 lb wt 15x  With over head reach with 5 lb wt    Treadmill walking  2.0 mph  X 6 min  RPE 4-5 2.0mph  X 8 min  RPE 5 2. 2mph  X 10 min  RPE 5 2. 2mph  X 11 min  RPE 5 2.0 mph  X 11 min  RPE 5 2.0 mph  X 11 min  RPE 5   Bridge   2 x 10 reps 2 x 10 reps  5lb weight on abdomen 2 x 10 reps  5lb weight on abdomen 2 x 10 reps with add squeeze    Isometric standing resisted trunk rotation. 10 x left/right       Mini squat with lift   5lb wt  2 x 10 reps 5lb wt  2 x 10 reps  5lb wt from 26in surface  2 x 10 reps    S/L hip abduction (left/right)    2 x 10 reps      [place] twist    2 x 10 reps      Sciatic nerve glide     2 x 10 reps 2 x 10 reps    Reece carry      3lb  10 x 25ft    Step up      Left/right  15x each    Hip hinge     10 x 10 sec       MANUAL THERAPY: (0 minutes): Joint mobilization and Soft tissue mobilization was utilized and necessary because of the patient's restricted joint motion and painful spasm. [facility] Portal  Treatment/Session Summary:    · Response to Treatment: pt with overall improvement in activity tolerance and no more episodes of muscle spasm in back since therapy intervention.  Please see d/c summary for further details. · Communication/Consultation: d/c sent to MD  · Equipment provided today:  None today.         Recommendations/Intent for next treatment session: d/c with HEP  Treatment Plan of Care Effective Dates:  11/11/19 to 1/10/20      Total Treatment Billable Duration:  40 min   PT Patient Time In/Time Out  Time In: 3496  Time Out: [facility] 318, PT    Future Appointments   Date Time Provider [facility]   12/12/2019 10:45 AM Piter Scott MD [facility] P   2/11/2020  9:20 AM Shaye Pisano MD [id number] [place]

## 2020-11-04 LAB — HBA1C MFR BLD HPLC: 7.3 %

## 2020-12-15 PROBLEM — Z00.00 MEDICARE ANNUAL WELLNESS VISIT, SUBSEQUENT: Status: ACTIVE | Noted: 2020-12-15

## 2020-12-17 PROBLEM — Z13.39 SCREENING FOR ALCOHOLISM: Status: ACTIVE | Noted: 2020-12-17

## 2020-12-17 PROBLEM — Z13.31 SCREENING FOR DEPRESSION: Status: ACTIVE | Noted: 2020-12-17

## 2020-12-17 PROBLEM — Z78.0 POSTMENOPAUSAL STATE: Status: ACTIVE | Noted: 2020-12-17

## 2020-12-17 PROBLEM — E66.01 MORBID OBESITY (HCC): Status: ACTIVE | Noted: 2020-12-17

## 2020-12-17 PROBLEM — Z12.31 ENCOUNTER FOR SCREENING MAMMOGRAM FOR MALIGNANT NEOPLASM OF BREAST: Status: ACTIVE | Noted: 2020-12-17

## 2020-12-18 RX ORDER — SODIUM CHLORIDE 0.9 % (FLUSH) 0.9 %
5-40 SYRINGE (ML) INJECTION AS NEEDED
Status: CANCELLED | OUTPATIENT
Start: 2020-12-18

## 2020-12-18 RX ORDER — SODIUM CHLORIDE 0.9 % (FLUSH) 0.9 %
5-40 SYRINGE (ML) INJECTION EVERY 8 HOURS
Status: CANCELLED | OUTPATIENT
Start: 2020-12-18

## 2020-12-31 ENCOUNTER — ANESTHESIA EVENT (OUTPATIENT)
Dept: SURGERY | Age: 65
End: 2020-12-31
Payer: MEDICARE

## 2021-01-04 ENCOUNTER — ANESTHESIA (OUTPATIENT)
Dept: SURGERY | Age: 66
End: 2021-01-04
Payer: MEDICARE

## 2021-01-04 ENCOUNTER — HOSPITAL ENCOUNTER (OUTPATIENT)
Age: 66
Setting detail: OUTPATIENT SURGERY
Discharge: HOME | End: 2021-01-04
Attending: ORTHOPAEDIC SURGERY | Admitting: ORTHOPAEDIC SURGERY
Payer: MEDICARE

## 2021-01-04 VITALS
TEMPERATURE: 98.2 F | HEART RATE: 73 BPM | OXYGEN SATURATION: 96 % | DIASTOLIC BLOOD PRESSURE: 81 MMHG | HEIGHT: 67 IN | SYSTOLIC BLOOD PRESSURE: 139 MMHG | BODY MASS INDEX: 39.24 KG/M2 | RESPIRATION RATE: 16 BRPM | WEIGHT: 250 LBS

## 2021-01-04 DIAGNOSIS — M66.241 SAGITTAL BAND RUPTURE, EXTENSOR TENDON, NONTRAUMATIC, RIGHT: Primary | ICD-10-CM

## 2021-01-04 LAB
GLUCOSE BLD STRIP.AUTO-MCNC: 106 MG/DL (ref 65–100)
GLUCOSE BLD STRIP.AUTO-MCNC: 124 MG/DL (ref 65–100)

## 2021-01-04 PROCEDURE — 77030028602 HC SUT SUPRMID SJCK -B: Performed by: ORTHOPAEDIC SURGERY

## 2021-01-04 PROCEDURE — 74011250636 HC RX REV CODE- 250/636: Performed by: ANESTHESIOLOGY

## 2021-01-04 PROCEDURE — 76010010054 HC POST OP PAIN BLOCK: Performed by: ORTHOPAEDIC SURGERY

## 2021-01-04 PROCEDURE — 77030021122 HC SPLNT MAT FST BSNM -A: Performed by: ORTHOPAEDIC SURGERY

## 2021-01-04 PROCEDURE — 77030003602 HC NDL NRV BLK BBMI -B: Performed by: ANESTHESIOLOGY

## 2021-01-04 PROCEDURE — 77030002916 HC SUT ETHLN J&J -A: Performed by: ORTHOPAEDIC SURGERY

## 2021-01-04 PROCEDURE — 74011000250 HC RX REV CODE- 250: Performed by: NURSE ANESTHETIST, CERTIFIED REGISTERED

## 2021-01-04 PROCEDURE — 76010000160 HC OR TIME 0.5 TO 1 HR INTENSV-TIER 1: Performed by: ORTHOPAEDIC SURGERY

## 2021-01-04 PROCEDURE — 74011250636 HC RX REV CODE- 250/636

## 2021-01-04 PROCEDURE — 82962 GLUCOSE BLOOD TEST: CPT

## 2021-01-04 PROCEDURE — 74011250636 HC RX REV CODE- 250/636: Performed by: NURSE ANESTHETIST, CERTIFIED REGISTERED

## 2021-01-04 PROCEDURE — 77030002986 HC SUT PROL J&J -A: Performed by: ORTHOPAEDIC SURGERY

## 2021-01-04 PROCEDURE — A4565 SLINGS: HCPCS | Performed by: ORTHOPAEDIC SURGERY

## 2021-01-04 PROCEDURE — 77030040922 HC BLNKT HYPOTHRM STRY -A: Performed by: ANESTHESIOLOGY

## 2021-01-04 PROCEDURE — 76060000033 HC ANESTHESIA 1 TO 1.5 HR: Performed by: ORTHOPAEDIC SURGERY

## 2021-01-04 PROCEDURE — 77030000032 HC CUF TRNQT ZIMM -B: Performed by: ORTHOPAEDIC SURGERY

## 2021-01-04 PROCEDURE — 77030002888 HC SUT CHRMC J&J -A: Performed by: ORTHOPAEDIC SURGERY

## 2021-01-04 PROCEDURE — 77030010509 HC AIRWY LMA MSK TELE -A: Performed by: ANESTHESIOLOGY

## 2021-01-04 PROCEDURE — 76210000063 HC OR PH I REC FIRST 0.5 HR: Performed by: ORTHOPAEDIC SURGERY

## 2021-01-04 PROCEDURE — 76942 ECHO GUIDE FOR BIOPSY: CPT | Performed by: ORTHOPAEDIC SURGERY

## 2021-01-04 PROCEDURE — 77030040361 HC SLV COMPR DVT MDII -B: Performed by: ORTHOPAEDIC SURGERY

## 2021-01-04 PROCEDURE — 2709999900 HC NON-CHARGEABLE SUPPLY: Performed by: ORTHOPAEDIC SURGERY

## 2021-01-04 PROCEDURE — 76210000020 HC REC RM PH II FIRST 0.5 HR: Performed by: ORTHOPAEDIC SURGERY

## 2021-01-04 RX ORDER — HYDROCODONE BITARTRATE AND ACETAMINOPHEN 5; 325 MG/1; MG/1
1 TABLET ORAL
Qty: 28 TAB | Refills: 0 | Status: SHIPPED | OUTPATIENT
Start: 2021-01-04 | End: 2021-01-11

## 2021-01-04 RX ORDER — LIDOCAINE HYDROCHLORIDE 10 MG/ML
0.1 INJECTION INFILTRATION; PERINEURAL AS NEEDED
Status: DISCONTINUED | OUTPATIENT
Start: 2021-01-04 | End: 2021-01-04 | Stop reason: HOSPADM

## 2021-01-04 RX ORDER — ONDANSETRON 2 MG/ML
INJECTION INTRAMUSCULAR; INTRAVENOUS AS NEEDED
Status: DISCONTINUED | OUTPATIENT
Start: 2021-01-04 | End: 2021-01-04 | Stop reason: HOSPADM

## 2021-01-04 RX ORDER — CEFAZOLIN SODIUM/WATER 2 G/20 ML
2 SYRINGE (ML) INTRAVENOUS ONCE
Status: COMPLETED | OUTPATIENT
Start: 2021-01-04 | End: 2021-01-04

## 2021-01-04 RX ORDER — SODIUM CHLORIDE, SODIUM LACTATE, POTASSIUM CHLORIDE, CALCIUM CHLORIDE 600; 310; 30; 20 MG/100ML; MG/100ML; MG/100ML; MG/100ML
75 INJECTION, SOLUTION INTRAVENOUS CONTINUOUS
Status: DISCONTINUED | OUTPATIENT
Start: 2021-01-04 | End: 2021-01-04 | Stop reason: HOSPADM

## 2021-01-04 RX ORDER — DEXAMETHASONE SODIUM PHOSPHATE 100 MG/10ML
INJECTION INTRAMUSCULAR; INTRAVENOUS AS NEEDED
Status: DISCONTINUED | OUTPATIENT
Start: 2021-01-04 | End: 2021-01-04 | Stop reason: HOSPADM

## 2021-01-04 RX ORDER — NALOXONE HYDROCHLORIDE 0.4 MG/ML
0.1 INJECTION, SOLUTION INTRAMUSCULAR; INTRAVENOUS; SUBCUTANEOUS AS NEEDED
Status: DISCONTINUED | OUTPATIENT
Start: 2021-01-04 | End: 2021-01-04 | Stop reason: HOSPADM

## 2021-01-04 RX ORDER — MIDAZOLAM HYDROCHLORIDE 1 MG/ML
2 INJECTION, SOLUTION INTRAMUSCULAR; INTRAVENOUS
Status: COMPLETED | OUTPATIENT
Start: 2021-01-04 | End: 2021-01-04

## 2021-01-04 RX ORDER — PROPOFOL 10 MG/ML
INJECTION, EMULSION INTRAVENOUS AS NEEDED
Status: DISCONTINUED | OUTPATIENT
Start: 2021-01-04 | End: 2021-01-04 | Stop reason: HOSPADM

## 2021-01-04 RX ORDER — OXYCODONE HYDROCHLORIDE 5 MG/1
5 TABLET ORAL
Qty: 28 TAB | Refills: 0 | Status: SHIPPED | OUTPATIENT
Start: 2021-01-04 | End: 2021-01-04

## 2021-01-04 RX ORDER — SODIUM CHLORIDE 0.9 % (FLUSH) 0.9 %
5-40 SYRINGE (ML) INJECTION AS NEEDED
Status: DISCONTINUED | OUTPATIENT
Start: 2021-01-04 | End: 2021-01-04 | Stop reason: HOSPADM

## 2021-01-04 RX ORDER — SODIUM CHLORIDE, SODIUM LACTATE, POTASSIUM CHLORIDE, CALCIUM CHLORIDE 600; 310; 30; 20 MG/100ML; MG/100ML; MG/100ML; MG/100ML
1000 INJECTION, SOLUTION INTRAVENOUS CONTINUOUS
Status: DISCONTINUED | OUTPATIENT
Start: 2021-01-04 | End: 2021-01-04 | Stop reason: HOSPADM

## 2021-01-04 RX ORDER — EPHEDRINE SULFATE/0.9% NACL/PF 50 MG/5 ML
SYRINGE (ML) INTRAVENOUS AS NEEDED
Status: DISCONTINUED | OUTPATIENT
Start: 2021-01-04 | End: 2021-01-04 | Stop reason: HOSPADM

## 2021-01-04 RX ORDER — SODIUM CHLORIDE 0.9 % (FLUSH) 0.9 %
5-40 SYRINGE (ML) INJECTION EVERY 8 HOURS
Status: DISCONTINUED | OUTPATIENT
Start: 2021-01-04 | End: 2021-01-04 | Stop reason: HOSPADM

## 2021-01-04 RX ORDER — ROPIVACAINE HYDROCHLORIDE 5 MG/ML
INJECTION, SOLUTION EPIDURAL; INFILTRATION; PERINEURAL
Status: COMPLETED | OUTPATIENT
Start: 2021-01-04 | End: 2021-01-04

## 2021-01-04 RX ORDER — ONDANSETRON 4 MG/1
4 TABLET, ORALLY DISINTEGRATING ORAL
Qty: 28 TAB | Refills: 0 | Status: SHIPPED | OUTPATIENT
Start: 2021-01-04 | End: 2022-05-12 | Stop reason: ALTCHOICE

## 2021-01-04 RX ORDER — FENTANYL CITRATE 50 UG/ML
100 INJECTION, SOLUTION INTRAMUSCULAR; INTRAVENOUS AS NEEDED
Status: DISCONTINUED | OUTPATIENT
Start: 2021-01-04 | End: 2021-01-04 | Stop reason: HOSPADM

## 2021-01-04 RX ORDER — DIPHENHYDRAMINE HYDROCHLORIDE 50 MG/ML
12.5 INJECTION, SOLUTION INTRAMUSCULAR; INTRAVENOUS ONCE
Status: DISCONTINUED | OUTPATIENT
Start: 2021-01-04 | End: 2021-01-04 | Stop reason: HOSPADM

## 2021-01-04 RX ADMIN — Medication 2 G: at 10:01

## 2021-01-04 RX ADMIN — ROPIVACAINE HYDROCHLORIDE 30 ML: 150 INJECTION, SOLUTION EPIDURAL; INFILTRATION; PERINEURAL at 09:43

## 2021-01-04 RX ADMIN — SODIUM CHLORIDE, SODIUM LACTATE, POTASSIUM CHLORIDE, AND CALCIUM CHLORIDE 1000 ML: 600; 310; 30; 20 INJECTION, SOLUTION INTRAVENOUS at 09:13

## 2021-01-04 RX ADMIN — Medication 10 MG: at 10:37

## 2021-01-04 RX ADMIN — Medication 10 MG: at 10:45

## 2021-01-04 RX ADMIN — DEXAMETHASONE SODIUM PHOSPHATE 10 MG: 10 INJECTION INTRAMUSCULAR; INTRAVENOUS at 10:48

## 2021-01-04 RX ADMIN — MIDAZOLAM 2 MG: 1 INJECTION INTRAMUSCULAR; INTRAVENOUS at 09:25

## 2021-01-04 RX ADMIN — ONDANSETRON 4 MG: 2 INJECTION INTRAMUSCULAR; INTRAVENOUS at 10:50

## 2021-01-04 RX ADMIN — PROPOFOL 200 MG: 10 INJECTION, EMULSION INTRAVENOUS at 10:06

## 2021-01-04 RX ADMIN — PROPOFOL 60 MG: 10 INJECTION, EMULSION INTRAVENOUS at 10:11

## 2021-01-04 NOTE — ANESTHESIA POSTPROCEDURE EVALUATION
Procedure(s):  RIGHT MIDDLE FINGER SAGITTAL BAND REPAIR PLEXUS.    total IV anesthesia    Anesthesia Post Evaluation      Multimodal analgesia: multimodal analgesia used between 6 hours prior to anesthesia start to PACU discharge  Patient location during evaluation: bedside  Patient participation: complete - patient participated  Level of consciousness: responsive to verbal stimuli  Pain management: adequate  Airway patency: patent  Anesthetic complications: no  Cardiovascular status: hemodynamically stable  Respiratory status: spontaneous ventilation  Hydration status: stable    Final Post Anesthesia Temperature Assessment:  Normothermia (36.0-37.5 degrees C)      INITIAL Post-op Vital signs:   Vitals Value Taken Time   /73 01/04/21 1115   Temp 36.4 °C (97.6 °F) 01/04/21 1108   Pulse 81 01/04/21 1115   Resp 16 01/04/21 1115   SpO2 93 % 01/04/21 1115

## 2021-01-04 NOTE — ANESTHESIA PROCEDURE NOTES
Peripheral Block    Start time: 1/4/2021 9:25 AM  End time: 1/4/2021 9:33 AM  Performed by: Elver Campos MD  Authorized by: Elver Campos MD       Pre-procedure:    Indications: at surgeon's request, post-op pain management and procedure for pain    Preanesthetic Checklist: patient identified, risks and benefits discussed, site marked, timeout performed, anesthesia consent given and patient being monitored    Timeout Time: 09:25          Block Type:   Block Type:  Axillary  Laterality:  Right  Monitoring:  Standard ASA monitoring, responsive to questions, continuous pulse ox, oxygen, frequent vital sign checks and heart rate  Injection Technique:  Single shot  Procedures: ultrasound guided    Patient Position: seated  Prep: chlorhexidine    Location:  Supraclavicular  Needle Type:  Stimuplex  Needle Gauge:  22 G  Needle Localization:  Ultrasound guidance  Medication Injected:  Ropivacaine (PF) (NAROPIN)(0.5%) 5 mg/mL injection, 30 mL  Med Admin Time: 1/4/2021 9:43 AM    Assessment:  Number of attempts:  1  Injection Assessment:  Incremental injection every 5 mL, negative aspiration for CSF, no paresthesia, ultrasound image on chart, no intravascular symptoms, negative aspiration for blood and local visualized surrounding nerve on ultrasound  Patient tolerance:  Patient tolerated the procedure well with no immediate complications

## 2021-01-04 NOTE — ANESTHESIA PREPROCEDURE EVALUATION
Relevant Problems   RESPIRATORY SYSTEM   (+) Dyspnea   (+) Mild intermittent asthma without complication   (+) Obstructive sleep apnea syndrome      NEUROLOGY   (+) TESS (generalized anxiety disorder)   (+) Mild episode of recurrent major depressive disorder (HCC)      CARDIOVASCULAR   (+) Hypertension, essential, benign      GASTROINTESTINAL   (+) Gastroesophageal reflux disease with esophagitis      ENDOCRINE   (+) Controlled type 2 diabetes with neuropathy (HCC)   (+) Diabetes mellitus without complication (HCC)   (+) Morbid obesity (HCC)   (+) Severe obesity (HCC)   (+) Type 2 diabetes mellitus with diabetic neuropathy (HCC)       Anesthetic History               Review of Systems / Medical History  Patient summary reviewed and pertinent labs reviewed    Pulmonary        Sleep apnea: CPAP  Shortness of breath  Asthma : well controlled       Neuro/Psych         Psychiatric history     Cardiovascular    Hypertension          CAD    Exercise tolerance: >4 METS  Comments: NST/Echo: Preserved EF  Mild to Mod AS   GI/Hepatic/Renal     GERD           Endo/Other    Diabetes    Morbid obesity and arthritis     Other Findings              Physical Exam    Airway  Mallampati: III  TM Distance: 4 - 6 cm  Neck ROM: normal range of motion   Mouth opening: Normal     Cardiovascular    Rhythm: regular  Rate: normal         Dental  No notable dental hx       Pulmonary  Breath sounds clear to auscultation               Abdominal  GI exam deferred       Other Findings            Anesthetic Plan    ASA: 3  Anesthesia type: total IV anesthesia      Post-op pain plan if not by surgeon: peripheral nerve block single      Anesthetic plan and risks discussed with: Patient

## 2021-01-04 NOTE — OP NOTES
ORTHOPAEDIC SURGICAL NOTE        Rosalina Brown female 63 y.o.  [id number]   1/4/2021     PRE-OP DIAGNOSIS: Sprain of metacarpophalangeal (MCP) joint of right middle finger, initial encounter [S63.394X]   POST-OP DIAGNOSIS: Sprain of metacarpophalangeal (MCP) joint of right middle finger, initial encounter [N21.667U]   LATERALITY: Right     PROCEDURES PERFORMED:   Right middle finger MP joint arthrotomy and synovectomy, right middle finger extensor tendon realignment and reconstruction of the sagittal band with local tendon (99266, 72099, 92223)       SURGEON:   Emilie Platt MD, PhD     IMPLANTS:   * No implants in log *   Procedure(s):  RIGHT MIDDLE FINGER SAGITTAL BAND REPAIR PLEXUS   Surgeon(s):  Princess Flor MD   Procedure(s):  RIGHT MIDDLE FINGER SAGITTAL BAND REPAIR PLEXUS     ANESTHESIA: Regional     STAFF:    Circ-1: Teresa Alonso RN  Scrub Tech-1: Lázaro Gonzáles BLOOD LOSS: Minimal       TOTAL IV FLUIDS : See anesthesia note    COMPLICATIONS: None     DRAINS:       TOURNIQUET TIME:   Total Tourniquet Time Documented:  Upper Arm (Right) - 33 minutes  Total: Upper Arm (Right) - 33 minutes       INDICATION FOR PROCEDURE:     Rosalina Brown sustained a traumatic injury to the right middle finger sagittal band, she has failed conservative management including extension splinting and cash taping. Surgical and non-surgical treatment options were discussed with the patient and their family, as well as the risk and benefits of each option. After thorough discussion, the patient decided to proceed with surgical management. Specific to this treatment plan, we discussed in detail surgical risks including scar, pain, bleeding, infection, anesthetic risks, neurovascular injury, failure to achieve desired results, hardware problems, need for further surgery,  weakness, stiffness, risk of death and potential risk of other unforseen complication.        The patient consented to the procedure after discussion of the risks and benefits. DESCRIPTION OF PROCEDURE:     The patient was identified in the holding room. The Right hand was marked and confirmed as the correct operative site. They were then brought to the OR and transferred onto the OR table in the supine position. All bony prominences were well padded. SCDs were placed on the bilateral legs throughout the case. A timeout was performed, verifying the correct patient, the correct side being the Right side and the correct procedure. Antibiotics were then administered, and were redosed during the procedure as needed at indicated intervals. A non-sterile tourniquet was placed on the Right arm. The Right upper extremity was pre scrubbed and then prepped and draped in routine sterile fashion. An incisional timeout was performed re-confirming the correct patient, surgical site and procedure, as well as verifying antibiotics. A curvilinear incision was made over the right middle finger MP joint, blunt dissection was carried down with care to protect the sensory nerves, the extensor tendon was identified, the middle finger EDC tendon had 3 slips, they were all subluxing ulnarly upon flexion of the MP joint, there was significant scar tissue on both sides of the MP joint, blunt dissection was used in order to develop a plane between the index and middle finger MP joint as well as the middle and ring finger MP joint, the ulnar-sided sagittal band had to be released in order to centralize the extensor tendon over the MP joint, over on the radial side there was barely any sagittal band left, most of it was injured and the remaining sagittal band was very scarred in, and MP joint arthrotomy was made through the radial side, the cartilage appeared to be in good condition.  The remnant of the radial sided sagittal band was shortened and repaired with a figure-of-eight 3-0 Ethibond stitch, a slip of radial sided EDC tendon was released proximally and then flipped underneath and onto the radial side, this was transferred around the repair site and secured to itself with ethibond interrupted sutures. At the end of the procedure the extensor tendon was central over the MP joint, it did not dislocate with full flexion of the MP joint. The tourniquet was deflated and hemostasis was ensured. The wounds were then thoroughly irrigated and closed in layered fashion with 4-0 Vicryl and 4-0 running prolene. A sterile dressing was applied followed by a  splint with the MP joints in extension     The patient was awakened and taken to PACU in stable condition. All sponge and needle counts were correct at the end of the case. I was present and scrubbed for the entire procedure. DISPOSITION:    The patient will be discharged home. Weightbearing status: NWB       CHEMICAL VTE (DVT) PROPHYLAXIS: None warranted for this case     FOLLOW-UP:  10-14 days for wound check and XRs if needed.      Geovanny Crowe MD    01/04/21  1:57 PM

## 2021-01-04 NOTE — H&P
H&P Update:  Mik Rothman was seen and examined. History and physical has been reviewed. The patient has been examined.  There have been no significant clinical changes since the completion of the originally dated History and Mae Wilson MD, PhD  Orthopaedic Surgery  01/04/21  8:44 AM

## 2021-01-04 NOTE — DISCHARGE INSTRUCTIONS
Postoperative  Instructions:      Weightbearing or Lifting: You  are  not  allowed  to  lift  any  weight  or  bear  any  weight  on  the  surgical  extremity  until  cleared  by  your  surgeon. Dressing  instructions:    Keep  your  dressing  and/or  splint  clean  and  dry  at  all  times. It  will  be  removed  at  your  first  post-operative  appointment. Your  stitches  will  be  removed  at  this  visit. Showering  Instructions:  May  shower  But keep surgical dressing clean and dry until removed as explained above. Pain  Control:  - You  have  been  given  a  prescription  to  be  taken  as  directed  for  post-operative  pain  control. In  addition,  elevate  the  operative  extremity  above  the  heart  at  all  times  to  prevent  swelling  and  throbbing  pain. - If you develop constipation while taking narcotic pain medications (Norco, Hydrocodone, Percocet, Oxycodone, Dilaudid, Hydromorphone) take  over-the-counter  Colace,  100mg  by  mouth  twice  a  Day. - Nausea  is  a  common  side  effect  of  many  pain  medications. You  will  want  to  eat something  before  taking  your  pain  medicine  to  help  prevent  Nausea. - If  you  are  taking  a  prescription  pain  medication  that  contains  acetaminophen,  we  recommend  that  you  do  not  take  additional  over  the  counter  acetaminophen  (Tylenol®). Other  pain  relieving  options:   - Using  a  cold  pack  to  ice  the  affected  area  a  few  times  a  day  (15  to  20  minutes  at  a  time)  can  help  to  relieve  pain,  reduce  swelling  and  bruising.      - Elevation  of  the  affected  area  can  also  help  to  reduce  pain  and  swelling. Did  you  receive  a  nerve  Block? A  nerve  block  can  provide  pain  relief  for  one  hour  to  two  days  after  your  surgery.   As  long  as  the  nerve  block  is  working,  you  will  experience  little  or  no  sensation  in  the  area  the surgeon  operated  on. As  the  nerve  block  wears  off,  you  will  begin  to  experience  pain  or  discomfort. It  is  very  important  that  you  begin  taking  your  prescribed  pain  medication  before  the  nerve  block  fully  wears  off. The first sign that the nerve block is wearing off is tingling in your fingers. Treating  your  pain  at  the  first  sign  of  the  block  wearing  off  will  ensure  your  pain  is  better  controlled  and  more  tolerable  when  full-sensation  returns. Do  not  wait  until  the  pain  is  intolerable,  as  the  medicine  will  be  less  effective. It  is  better  to  treat  pain  in  advance  than  to  try  and  catch  up. General  Anesthesia or Sedation:      If  you  did  not  receive  a  nerve  block  during  your  surgery,  you  will  need  to  start  taking  your  pain  medication  shortly  after  your  surgery  and  should  continue  to  do  so  as  prescribed  by  your  surgeon. Please  call  [phone]  with any concern and ask to speak with Lee Katz. Concerning problems include:      -  Excessive  redness  of  the  incisions      -  Drainage  for  more  than  2  Days after surgery or any foul smelling drainage  -  Fever  of  more  than  101.5  F      Please  call  [phone]  if  you  do  not  receive  or  are  unsure  of  your  first  follow-up  appointment. You  should  see  the  doctor  10-14  days  after  your  Surgery. Thank you for choosing me and [address] [place] [address] for your care. I will go above and beyond to ensure you receive the best care possible. Dagmar Castellanos MD, PhD          Payton Mercado Call your doctor if pain is NOT relieved by medication.      Excessive bleeding that does not stop after holding pressure over the area  · Temperature of 101 degrees F or above  · Excessive redness, swelling or bruising, and/ or green or yellow, smelly discharge from incision      TYPICAL SIDE EFFECTS OF PAIN MEDICATION:     Constipation: Drink lots of fluids. Over the counter stool softener if needed.    Nausea: Take pain medication with food. Call your doctor with persistent nausea. ACTIVITY  · As tolerated and as directed by your doctor. · Bathe or shower as directed by your doctor. DIET  · Day of surgery: Clear liquids until no nausea or vomiting; small portion, light diet [place] foods (ex: baked chicken, plain rice, grits, scrambled eggs, toast). Nothing greasy, fried or spicy today. · Advance to regular diet on second day, unless your doctor orders otherwise. · If nausea and vomiting continues, call your doctor. PAIN  · Take pain medication as directed by your doctor. · DO NOT take aspirin or blood thinners unless directed by your doctor. AFTER ANESTHESIA   · For the first 24 hours: DO NOT Drive, Drink alcoholic beverages, or Make important decisions. · Be aware of dizziness following anesthesia and while taking pain medication. DISCHARGE SUMMARY from Nurse    PATIENT INSTRUCTIONS:    After general anesthesia or intravenous sedation, for 24 hours or while taking prescription Narcotics:  · Limit your activities  · Do not drive and operate hazardous machinery  · Do not make important personal or business decisions  · Do  not drink alcoholic beverages  · If you have not urinated within 8 hours after discharge, please contact your surgeon on call. *  Please give a list of your current medications to your Primary Care Provider. *  Please update this list whenever your medications are discontinued, doses are      changed, or new medications (including over-the-counter products) are added. *  Please carry medication information at all times in case of emergency situations. Preventing Infection at Home  We care about preventing infection and avoiding the spread of germs - not only when you are in the hospital but also when you return home.  When you return home from the hospital, its important to take the following steps to help prevent infection and avoid spreading germs that could infect you and others. Ask everyone in your home to follow these guidelines, too. Clean Your Hands  · Clean your hands whenever your hands are visibly dirty, before you eat, before or after touching your mouth, nose or eyes, and before preparing food. Clean them after contact with body fluids, using the restroom, touching animals or changing diapers. · When washing hands, wet them with warm water and work up a lather. Rub hands for at least 15 seconds, then rinse them and pat them dry with a clean towel or paper towel. · When using hand sanitizers, it should take about 15 seconds to rub your hands dry. If not, you probably didnt apply enough . Cover Your Sneeze or Cough  Germs are released into the air whenever you sneeze or cough. To prevent the spread of infection:  · Turn away from other people before coughing or sneezing. · Cover your mouth or nose with a tissue when you cough or sneeze. Put the tissue in the trash. · If you dont have a tissue, cough or sneeze into your upper sleeve, not your hands. · Always clean your hands after coughing or sneezing. Care for Wounds  Your skin is your bodys first line of defense against germs, but an open wound leaves an easy way for germs to enter your body. To prevent infection:  · Clean your hands before and after changing wound dressings, and wear gloves to change dressings if recommended by your doctor. · Take special care with IV lines or other devices inserted into the body. If you must touch them, clean your hands first.  · Follow any specific instructions from your doctor to care for your wounds. Contact your doctor if you experience any signs of infection, such as fever or increased redness at the surgical or wound site.     Keep a Clean Home  · Clean or wipe commonly touched hard surfaces like door handles, sinks, tabletops, phones and TV remotes. · Use products labeled disinfectant to kill harmful bacteria and viruses. · Use a clean cloth or paper towel to clean and dry surfaces. Wiping surfaces with a dirty dishcloth, sponge or towel will only spread germs. · Never share toothbrushes, arredondo, drinking glasses, utensils, razor blades, face cloths or bath towels to avoid spreading germs. · Be sure that the linens that you sleep on are clean. · Keep pets away from wounds and wash your hands after touching pets, their toys or bedding. We care about you and your health. Remember, preventing infections is a team effort between you, your family, friends and health care providers. These are general instructions for a healthy lifestyle:    No smoking/ No tobacco products/ Avoid exposure to second hand smoke    Surgeon General's Warning:  Quitting smoking now greatly reduces serious risk to your health. Obesity, smoking, and sedentary lifestyle greatly increases your risk for illness    A healthy diet, regular physical exercise & weight monitoring are important for maintaining a healthy lifestyle    You may be retaining fluid if you have a history of heart failure or if you experience any of the following symptoms:  Weight gain of 3 pounds or more overnight or 5 pounds in a week, increased swelling in our hands or feet or shortness of breath while lying flat in bed. Please call your doctor as soon as you notice any of these symptoms; do not wait until your next office visit. Recognize signs and symptoms of STROKE:    F-face looks uneven    A-arms unable to move or move unevenly    S-speech slurred or non-existent    T-time-call 911 as soon as signs and symptoms begin-DO NOT go       Back to bed or wait to see if you get better-TIME IS BRAIN.     Advance Care Planning  People with COVID-19 may have no symptoms, mild symptoms, such as fever, cough, and shortness of breath or they may have more severe illness, developing severe and fatal pneumonia. As a result, Advance Care Planning with attention to naming a health care decision maker (someone you trust to make healthcare decisions for you if you could not speak for yourself) and sharing other health care preferences is important BEFORE a possible health crisis. Please contact your Primary Care Provider to discuss Advance Care Planning. Preventing the Spread of Coronavirus Disease 2019 in Homes and Residential Communities  For the most recent information go to [email or web address]    Prevention steps for People with confirmed or suspected COVID-19 (including persons under investigation) who do not need to be hospitalized  and   People with confirmed COVID-19 who were hospitalized and determined to be medically stable to go home    Your healthcare provider and public health staff will evaluate whether you can be cared for at home. If it is determined that you do not need to be hospitalized and can be isolated at home, you will be monitored by staff from your local or state health department. You should follow the prevention steps below until a healthcare provider or local or state health department says you can return to your normal activities. Stay home except to get medical care  People who are mildly ill with COVID-19 are able to isolate at home during their illness. You should restrict activities outside your home, except for getting medical care. Do not go to work, school, or public areas. Avoid using public transportation, ride-sharing, or taxis. Separate yourself from other people and animals in your home  People: As much as possible, you should stay in a specific room and away from other people in your home. Also, you should use a separate bathroom, if available. Animals:  You should restrict contact with pets and other animals while you are sick with COVID-19, just like you would around other people. Although there have not been reports of pets or other animals becoming sick with COVID-19, it is still recommended that people sick with COVID-19 limit contact with animals until more information is known about the virus. When possible, have another member of your household care for your animals while you are sick. If you are sick with COVID-19, avoid contact with your pet, including petting, snuggling, being kissed or licked, and sharing food. If you must care for your pet or be around animals while you are sick, wash your hands before and after you interact with pets and wear a facemask. Call ahead before visiting your doctor  If you have a medical appointment, call the healthcare provider and tell them that you have or may have COVID-19. This will help the healthcare providers office take steps to keep other people from getting infected or exposed. Wear a facemask  You should wear a facemask when you are around other people (e.g., sharing a room or vehicle) or pets and before you enter a healthcare providers office. If you are not able to wear a facemask (for example, because it causes trouble breathing), then people who live with you should not stay in the same room with you, or they should wear a facemask if they enter your room. Cover your coughs and sneezes  Cover your mouth and nose with a tissue when you cough or sneeze. Throw used tissues in a lined trash can. Immediately wash your hands with soap and water for at least 20 seconds or, if soap and water are not available, clean your hands with an alcohol-based hand  that contains at least 60% alcohol. Clean your hands often  Wash your hands often with soap and water for at least 20 seconds, especially after blowing your nose, coughing, or sneezing; going to the bathroom; and before eating or preparing food.  If soap and water are not readily available, use an alcohol-based hand  with at least 60% alcohol, covering all surfaces of your hands and rubbing them together until they feel dry. Soap and water are the best option if hands are visibly dirty. Avoid touching your eyes, nose, and mouth with unwashed hands. Avoid sharing personal household items  You should not share dishes, drinking glasses, cups, eating utensils, towels, or bedding with other people or pets in your home. After using these items, they should be washed thoroughly with soap and water. Clean all high-touch surfaces everyday  High touch surfaces include counters, tabletops, doorknobs, bathroom fixtures, toilets, phones, keyboards, tablets, and bedside tables. Also, clean any surfaces that may have blood, stool, or body fluids on them. Use a household cleaning spray or wipe, according to the label instructions. Labels contain instructions for safe and effective use of the cleaning product including precautions you should take when applying the product, such as wearing gloves and making sure you have good ventilation during use of the product. Monitor your symptoms  Seek prompt medical attention if your illness is worsening (e.g., difficulty breathing). Before seeking care, call your healthcare provider and tell them that you have, or are being evaluated for, COVID-19. Put on a facemask before you enter the facility. These steps will help the healthcare providers office to keep other people in the office or waiting room from getting infected or exposed. Ask your healthcare provider to call the local or state health department. Persons who are placed under active monitoring or facilitated self-monitoring should follow instructions provided by their local health department or occupational health professionals, as appropriate. When working with your local health department check their available hours. If you have a medical emergency and need to call 911, notify the dispatch personnel that you have, or are being evaluated for COVID-19.  If possible, put on a facemask before emergency medical services arrive. Discontinuing home isolation  Patients with confirmed COVID-19 should remain under home isolation precautions until the risk of secondary transmission to others is thought to be low. The decision to discontinue home isolation precautions should be made on a case-by-case basis, in consultation with healthcare providers and state and local health departments.

## 2021-01-14 PROBLEM — Z00.00 MEDICARE ANNUAL WELLNESS VISIT, SUBSEQUENT: Status: RESOLVED | Noted: 2020-12-15 | Resolved: 2021-01-14

## 2021-01-16 PROBLEM — Z12.31 ENCOUNTER FOR SCREENING MAMMOGRAM FOR MALIGNANT NEOPLASM OF BREAST: Status: RESOLVED | Noted: 2020-12-17 | Resolved: 2021-01-16

## 2021-01-21 ENCOUNTER — HOSPITAL ENCOUNTER (OUTPATIENT)
Dept: PHYSICAL THERAPY | Age: 66
Discharge: HOME | End: 2021-01-21
Payer: MEDICARE

## 2021-01-21 PROCEDURE — 97140 MANUAL THERAPY 1/> REGIONS: CPT

## 2021-01-21 PROCEDURE — 97110 THERAPEUTIC EXERCISES: CPT

## 2021-01-21 PROCEDURE — 97010 HOT OR COLD PACKS THERAPY: CPT

## 2021-01-21 PROCEDURE — 97161 PT EVAL LOW COMPLEX 20 MIN: CPT
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
## 2021-01-22 NOTE — PROGRESS NOTES
Aleksander Mauricio  : 1955  Payor: Ozzy Butler / Plan: Via [employer]  / Product Type: Managed Care Medicare /  [address] [place] [address] at [address] [place] [address]. [facility], [address] [place] [address]  [phone]   [phone]                                                          Beverly Hanson NP      OUTPATIENT PHYSICAL THERAPY: Daily Treatment Note 2021 Visit Count:  1    Tx Diagnosis:  Pain in right hand (M79.641)  Stiffness of right hand, not elsewhere classified (M25.641)        Pre-treatment Symptoms/Complaints:  See Initial Eval Dated 21 for more details. Pain: Initial:0/10 Post Session: 0/10   Medications Last Reviewed:  2021     Updated Objective Findings: See Initial Eval for more details. TREATMENT:   THERAPEUTIC EXERCISE: (15 minutes):  Exercises per grid below to improve mobility, strength and balance. Required minimal visual, verbal and manual cues to promote proper body alignment and promote proper body posture. Progressed resistance and complexity of movement as indicated. Date:  2021 Date:   Date:     Activity/Exercise Parameters Parameters Parameters   Education HEP, POC, PT goals, anatomy/pathology, splint use percations     Finger ext hand flat 10      Hook fist 10     Short arc MP flex to 30                              THERAPEUTIC ACTIVITY: ( 0 minutes): Activities per gid below to improve functional movement related mobility, strength and balance to improve neuro-muscular carryover to daily functional activities for improving patient's quality of life. Required visual, verbal and manual cues to promote proper body alignment and promote proper body posture/mechanics. Progressed resistance and complexity of movement as indicated.      Date:  2021 Date:   Date:     Activity/Exercise Parameters Parameters Parameters     71 Karina Jarvis             MANUAL THERAPY: (10 minutes): Joint mobilization, Soft tissue mobilization was utilized and necessary because of the patient's restricted joint motion and restricted motion of soft tissue mobility. Date  1/21/2021    Technique Used Grade  Level # Time(s) Effect while being performed   Scar massage     Decrease pain improve mobility                                                          HEP Log Date 1. Active extension palm flat 1/21/2021   2. Hook fist  1/21/2021   3. MP flexion 0/30 1/21/2021   4.    5.           [employer] Portal  Treatment/Session Summary:    Response to Treatment: Pt demonstrated understanding of POC and initial HEP. No increase in pain or adverse reactions. Communication/Consultation:  POC, HEP, PT goals, Faxed initial evaluation to MD.   Equipment provided today: HEP Handout     Recommendations/Intent for next treatment session:   Next visit will focus on Flexion-based Exercises Extension-Based Exercises (EOTA). Per protocol in chart           Treatment Plan of Care Effective Dates: 1/21/2021 TO 3/23/2021 (60 days).   Frequency/Duration: 2 times a week for 60 Days             Total Treatment Billable Duration:   25  Rx plus Eval   PT Patient Time In/Time Out  Time In: 1030  Time Out: 1 S Rayray Blank, RT    Future Appointments   Date Time Provider Barrie Lynne   1/28/2021 10:30 AM WesfuJudith mars, RT [facility]   2/4/2021 10:30 AM PapenfuJudith mars, RT [facility]   2/11/2021 10:30 AM WesfuJudith mars, RT [facility]   2/15/2021 10:30 AM PapenfuJudith mars, RT [facility]   2/18/2021 10:30 AM PapenfuJudith mars, RT [facility]   2/22/2021 10:30 AM PapmaximofuJudith mars, RT [facility]   2/25/2021 10:30 AM PapenfuJudith mars, RT [facility]   3/1/2021 10:30 AM WesfuJudith mars, RT [facility]   3/4/2021 10:30 AM Judith Bain RT [facility]   6/15/2021  9:50 AM Wan Esquivel MD [facility]

## 2021-01-28 ENCOUNTER — APPOINTMENT (OUTPATIENT)
Dept: PHYSICAL THERAPY | Age: 66
End: 2021-01-28
Payer: MEDICARE

## 2021-02-04 ENCOUNTER — HOSPITAL ENCOUNTER (OUTPATIENT)
Dept: PHYSICAL THERAPY | Age: 66
Discharge: HOME | End: 2021-02-04
Payer: MEDICARE

## 2021-02-04 PROCEDURE — 97110 THERAPEUTIC EXERCISES: CPT

## 2021-02-04 PROCEDURE — 97760 ORTHOTIC MGMT&TRAING 1ST ENC: CPT

## 2021-02-04 NOTE — PROGRESS NOTES
Maribell Hilton  : 1955  Payor: Aaliyah Christine / Plan: Kelle Jason / Product Type: Managed Care Medicare /  [address] at [address]. [facility] [address] [place], [address]  [phone]   [phone]                                                          Werner Hanson NP      OUTPATIENT PHYSICAL THERAPY: Daily Treatment Note 2021 Visit Count:  2    Tx Diagnosis:  Pain in right hand (M79.641)  Stiffness of right hand, not elsewhere classified (M25.641)        Pre-treatment Symptoms/Complaints:  See Initial Eval Dated 21 for more details. Pain: Initial:0/10 Post Session: 0/10   Medications Last Reviewed:  2021     Updated Objective Findings: See Initial Eval for more details. TREATMENT:   THERAPEUTIC EXERCISE: (10 minutes):  Exercises per grid below to improve mobility, strength and balance. Required minimal visual, verbal and manual cues to promote proper body alignment and promote proper body posture. Progressed resistance and complexity of movement as indicated. Date:  2021 Date:  21 Date:     Activity/Exercise Parameters Parameters Parameters   Education HEP, POC, PT goals, anatomy/pathology, splint use percations Review of HEP, splint wear    Finger ext hand flat 10  10 x 2    Hook fist 10 10    Short arc MP flex to 30  10                          Orthotic Management and Training: replacement of original splint and repair  15 min    THERAPEUTIC ACTIVITY: ( 0 minutes): Activities per gid below to improve functional movement related mobility, strength and balance to improve neuro-muscular carryover to daily functional activities for improving patient's quality of life. Required visual, verbal and manual cues to promote proper body alignment and promote proper body posture/mechanics. Progressed resistance and complexity of movement as indicated.      Date:  2021 Date:   Date:     Activity/Exercise Parameters Parameters Parameters                                                                               MANUAL THERAPY: (0 minutes): Joint mobilization, Soft tissue mobilization was utilized and necessary because of the patient's restricted joint motion and restricted motion of soft tissue mobility. Date  2/4/2021    Technique Used Grade  Level # Time(s) Effect while being performed   Scar massage     Decrease pain improve mobility                                                          HEP Log Date 1. Active extension palm flat 1/21/2021   2. Hook fist  1/21/2021   3. MP flexion 0/30 1/21/2021   4.    5.           [employer] Portal  Treatment/Session Summary:    Response to Treatment: Pt demonstrated understanding of POC and initial HEP. No increase in pain or adverse reactions. Communication/Consultation:  POC, HEP, PT goals, Faxed initial evaluation to MD. one more week full time in splint   Equipment provided today: HEP Handout     Recommendations/Intent for next treatment session:   Next visit will focus on Flexion-based Exercises Extension-Based Exercises (EOTA). Per protocol in chart           Treatment Plan of Care Effective Dates: 1/21/2021 TO 3/23/2021 (60 days).   Frequency/Duration: 2 times a week for 60 Days             Total Treatment Billable Duration:   25  Rx  PT Patient Time In/Time Out  Time In: 1040  Time Out: 115 Karen Blank, RT    Future Appointments   Date Time Provider Barrie Lynne   2/11/2021 10:30 AM Douglas Bain, RT [facility]   2/15/2021 10:30 AM Douglas Bain, RT [facility]   2/18/2021 10:30 AM Douglas Bain, RT [facility]   2/22/2021 10:30 AM Douglas Bain, RT [facility]   2/25/2021 10:30 AM Douglas Bain, RT [facility]   3/1/2021 10:30 AM Douglas Bain, RT [facility]   3/4/2021 10:30 AM Douglas Bain, RT [facility]   6/15/2021  9:50 AM Marilee Wu MD [facility]

## 2021-02-11 ENCOUNTER — HOSPITAL ENCOUNTER (OUTPATIENT)
Dept: PHYSICAL THERAPY | Age: 66
Discharge: HOME | End: 2021-02-11
Payer: MEDICARE

## 2021-02-11 PROCEDURE — 97110 THERAPEUTIC EXERCISES: CPT

## 2021-02-11 PROCEDURE — 97140 MANUAL THERAPY 1/> REGIONS: CPT

## 2021-02-11 PROCEDURE — 97035 APP MDLTY 1+ULTRASOUND EA 15: CPT

## 2021-02-11 NOTE — PROGRESS NOTES
Manny Muñoz  : 1955  Payor: Allyson Garcia / [facility]  / Product Type: Managed Care Medicare /  [address] [place] [address] at [address] [place] [address]. [facility], [address] [place], [address] [place] [address]  [phone]   [phone]                                                          Austyn Hanson NP      OUTPATIENT PHYSICAL THERAPY: Daily Treatment Note 2021 Visit Count:  3    Tx Diagnosis:  Pain in right hand (M79.641)  Stiffness of right hand, not elsewhere classified (M25.641)        Pre-treatment Symptoms/Complaints: soreness  Pain: Initial:0/10 Post Session: 0/10   Medications Last Reviewed:  2021     Updated Objective Findings: full MP extension flexion to 40. IP flexion full        TREATMENT:   THERAPEUTIC EXERCISE: (15 minutes):  Exercises per grid below to improve mobility, strength and balance. Required minimal visual, verbal and manual cues to promote proper body alignment and promote proper body posture. Progressed resistance and complexity of movement as indicated. Date:  2021 Date:  21 Date:  21   Activity/Exercise Parameters Parameters Parameters   Education HEP, POC, PT goals, anatomy/pathology, splint use percations Review of HEP, splint wear Review HEP   Finger ext hand flat 10  10 x 2 2 x 10   Hook fist 10 10 2 x 10   Short arc MP flex to 30  10 2 x 10   Finger extension rolling   3 min                       THERAPEUTIC ACTIVITY: ( 0 minutes): Activities per gid below to improve functional movement related mobility, strength and balance to improve neuro-muscular carryover to daily functional activities for improving patient's quality of life. Required visual, verbal and manual cues to promote proper body alignment and promote proper body posture/mechanics. Progressed resistance and complexity of movement as indicated.      Date:  2021 Date:   Date:     Activity/Exercise Parameters Parameters Parameters                                                                              MANUAL THERAPY: (15 minutes): Joint mobilization, Soft tissue mobilization was utilized and necessary because of the patient's restricted joint motion and restricted motion of soft tissue mobility. Date  2/11/2021    Technique Used Grade  Level # Time(s) Effect while being performed   Scar massage     Decrease pain improve mobility                                               MODALITIES: ( 8 minutes): *  Ultrasound was used today secondary to the patient having tightened structures limiting joint motion that require an increase in extensibility. Ultrasound was used today to increase tendon flexibility. 1.0 w/cm continuous             HEP Log Date 1. Active extension palm flat 1/21/2021   2. Hook fist  1/21/2021   3. MP flexion 0/30 1/21/2021   4 finger extension rolling  2/11/21   5. [employer] Portal  Treatment/Session Summary:    Response to Treatment: Pt demonstrated understanding of POC and initial HEP. No increase in pain or adverse reactions. Communication/Consultation:  Patient to see MD next week plan DC of splint   Equipment provided today: none     Recommendations/Intent for next treatment session:   Next visit will focus on Flexion-based Exercises Extension-Based Exercises (EOTA). Per protocol in chart           Treatment Plan of Care Effective Dates: 1/21/2021 TO 3/23/2021 (60 days).   Frequency/Duration: 2 times a week for 60 Days             Total Treatment Billable Duration:  38 min Rx  PT Patient Time In/Time Out  Time In: 1030  Time Out: 1212 [facility]     Future Appointments   Date Time Provider Barrie Lynne   2/15/2021 10:30 AM Stephy Bain, RT [facility]   2/18/2021 10:30 AM Stephy Bain, RT [facility]   2/22/2021 10:30 AM Stephy Bain, RT [facility]   2/25/2021 10:30 AM Stephy Bain, RT [facility] 3/1/2021 10:30 AM [facility]ain[facility]   3/4/2021 10:30 AM Judith Bain, RT [facility]   6/15/2021  9:50 AM MD ANTONELLA Riggins FVP [facility]

## 2021-02-15 ENCOUNTER — HOSPITAL ENCOUNTER (OUTPATIENT)
Dept: PHYSICAL THERAPY | Age: 66
Discharge: HOME | End: 2021-02-15
Payer: MEDICARE

## 2021-02-15 PROCEDURE — 97110 THERAPEUTIC EXERCISES: CPT

## 2021-02-15 PROCEDURE — 97035 APP MDLTY 1+ULTRASOUND EA 15: CPT

## 2021-02-15 PROCEDURE — 97140 MANUAL THERAPY 1/> REGIONS: CPT

## 2021-02-18 ENCOUNTER — HOSPITAL ENCOUNTER (OUTPATIENT)
Dept: PHYSICAL THERAPY | Age: 66
Discharge: HOME | End: 2021-02-18
Payer: MEDICARE

## 2021-02-18 PROCEDURE — 97035 APP MDLTY 1+ULTRASOUND EA 15: CPT

## 2021-02-18 PROCEDURE — 97140 MANUAL THERAPY 1/> REGIONS: CPT

## 2021-02-18 PROCEDURE — 97110 THERAPEUTIC EXERCISES: CPT

## 2021-02-18 NOTE — PROGRESS NOTES
Sage Murry  : 1955  Payor: Kojo Saul / Plan: Gordon Ross / Product Type: Managed Care Medicare /  [address] at [address] [place]. [facility], [address] [place] [address], [facility], [address] [place] [address]  [phone]   [phone]                                                          Elayne Hanson NP      OUTPATIENT PHYSICAL THERAPY: Daily Treatment Note 2021 Visit Count:  5    Tx Diagnosis:  Pain in right hand (M79.641)  Stiffness of right hand, not elsewhere classified (M25.641)        Pre-treatment Symptoms/Complaints: saw MD to wear splint at night and strenuous activities for one more month. Pain: Initial:0/10 Post Session: 0/10   Medications Last Reviewed:  2021     Updated Objective Findings: full MP 3rd finger extension flexion to 70. IP flexion full        TREATMENT:   THERAPEUTIC EXERCISE: (15 minutes):  Exercises per grid below to improve mobility, strength and balance. Required minimal visual, verbal and manual cues to promote proper body alignment and promote proper body posture. Progressed resistance and complexity of movement as indicated. Date:  2021 Date:  21 Date:  21 Date  2/15/21 Date  21   Activity/Exercise Parameters Parameters Parameters     Education HEP, POC, PT goals, anatomy/pathology, splint use percations Review of HEP, splint wear Review HEP Update HEP tendon glides all Joint protection principles   Finger ext hand flat 10  10 x 2 2 x 10 2 x 10    Hook fist 10 10 2 x 10 2 x 10    Short arc MP flex to 30  10 2 x 10     Finger extension rolling   3 min review 5 min   Intrinsic plus    2 x 10    Composite fist    10 x Bean grab 5 min    Individual finger extension     10 x all            THERAPEUTIC ACTIVITY: ( 0 minutes):  Activities per gid below to improve functional movement related mobility, strength and balance to improve neuro-muscular carryover to daily functional activities for improving patient's quality of life. Required visual, verbal and manual cues to promote proper body alignment and promote proper body posture/mechanics. Progressed resistance and complexity of movement as indicated. Date:  2/18/2021 Date:   Date:     Activity/Exercise Parameters Parameters Parameters                                                                               MANUAL THERAPY: (15 minutes): Joint mobilization, Soft tissue mobilization was utilized and necessary because of the patient's restricted joint motion and restricted motion of soft tissue mobility. Date  2/18/2021    Technique Used Grade  Level # Time(s) Effect while being performed   Scar massage     Decrease pain improve mobility                                               MODALITIES: ( 8 minutes): *  Ultrasound was used today secondary to the patient having tightened structures limiting joint motion that require an increase in extensibility. Ultrasound was used today to increase tendon flexibility. 1.0 w/cm continuous             HEP Log Date 1. Active extension palm flat 1/21/2021   2. Hook fist  1/21/2021   3. MP flexion 0/30 1/21/2021   4 finger extension rolling  2/11/21   5. [employer] Portal  Treatment/Session Summary:    Response to Treatment: Pt demonstrated understanding of POC and initial HEP. No increase in pain or adverse reactions. Communication/Consultation:  Patient to follow MD instructions for next month. Equipment provided today: none     Recommendations/Intent for next treatment session:   Next visit will focus on Flexion-based Exercises Extension-Based Exercises (EOTA). Per protocol in chart           Treatment Plan of Care Effective Dates: 1/21/2021 TO 3/23/2021 (60 days).   Frequency/Duration: 2 times a week for 60 Days             Total Treatment Billable Duration:  38 min Rx  PT Patient Time In/Time Out  Time In: 1030  Time Out: [id number] [facility], [facility] Appointments   Date Time Provider Barrie Lynne   2/22/2021 10:30 AM Bhavik Bain, RT [facility] AND [facility]   2/25/2021 10:30 AM Bhavik Bain, RT [facility]   3/1/2021 10:30 AM Bhavik Bain, RT [facility]   3/4/2021 10:30 AM Bhavik Bain, RT [facility]   6/15/2021  9:50 AM Patel Gunderson MD SSA [facility]

## 2021-02-22 ENCOUNTER — HOSPITAL ENCOUNTER (OUTPATIENT)
Dept: PHYSICAL THERAPY | Age: 66
Discharge: HOME | End: 2021-02-22
Payer: MEDICARE

## 2021-02-22 NOTE — PROGRESS NOTES
Ger Brown  : 1955  Payor: Kaylene Moreno / Plan: Via [employer]  / Product Type: Managed Care Medicare /  [id number] [facility]. [facility], [address] [place] [address], [facility], [address] [place] [address]  [phone]   [phone]                                                          Elsy Hanson NP      OUTPATIENT PHYSICAL THERAPY: Daily Treatment Note 2021 Visit Count:  6    Tx Diagnosis:  Pain in right hand (M79.641)  Stiffness of right hand, not elsewhere classified (M25.641)        Pre-treatment Symptoms/Complaints: saw MD to wear splint at night and strenuous activities for one more month. Did not go to bed until 4 am.  Pain: Initial:0/10 Post Session: 0/10   Medications Last Reviewed:  2021     Updated Objective Findings: full MP 3rd finger extension flexion to 70. IP flexion full        TREATMENT:   THERAPEUTIC EXERCISE: (8 minutes):  Exercises per grid below to improve mobility, strength and balance. Required minimal visual, verbal and manual cues to promote proper body alignment and promote proper body posture. Progressed resistance and complexity of movement as indicated. Date:  2021 Date:  21 Date:  21 Date  2/15/21 Date  21 Date  21   Activity/Exercise Parameters Parameters Parameters      Education HEP, POC, PT goals, anatomy/pathology, splint use percations Review of HEP, splint wear Review HEP Update HEP tendon glides all Joint protection principles    Finger ext hand flat 10  10 x 2 2 x 10 2 x 10     Hook fist 10 10 2 x 10 2 x 10     Short arc MP flex to 30  10 2 x 10      Finger extension rolling   3 min review 5 min 5 min    Intrinsic plus    2 x 10     Composite fist    10 x Bean grab 5 min     Individual finger extension     10 x all  10 x all            THERAPEUTIC ACTIVITY: ( 0 minutes):  Activities per gid below to improve functional movement related mobility, strength and balance to improve neuro-muscular carryover to daily functional activities for improving patient's quality of life. Required visual, verbal and manual cues to promote proper body alignment and promote proper body posture/mechanics. Progressed resistance and complexity of movement as indicated. Date:  2/22/2021 Date:   Date:     Activity/Exercise Parameters Parameters Parameters                                                                               MANUAL THERAPY: (15 minutes): Joint mobilization, Soft tissue mobilization was utilized and necessary because of the patient's restricted joint motion and restricted motion of soft tissue mobility. Date  2/22/2021    Technique Used Grade  Level # Time(s) Effect while being performed   Scar massage     Decrease pain improve mobility                                               MODALITIES: ( 8 minutes): *  Ultrasound was used today secondary to the patient having tightened structures limiting joint motion that require an increase in extensibility. Ultrasound was used today to increase tendon flexibility. 1.0 w/cm continuous. Heat prior to US for 8 min. HEP Log Date 1. Active extension palm flat 1/21/2021   2. Hook fist  1/21/2021   3. MP flexion 0/30 1/21/2021   4 finger extension rolling  2/11/21   5. [employer] Portal  Treatment/Session Summary:    Response to Treatment: Pt demonstrated understanding of POC and initial HEP. No increase in pain or adverse reactions. Communication/Consultation:  Patient to follow MD instructions for next month. Equipment provided today: none     Recommendations/Intent for next treatment session:   Next visit will focus on Flexion-based Exercises Extension-Based Exercises (EOTA). Per protocol in chart           Treatment Plan of Care Effective Dates: 1/21/2021 TO 3/23/2021 (60 days).   Frequency/Duration: 2 times a week for 60 Days             Total Treatment Billable Duration:  31 min Rx  PT Patient Time In/Time Out  Time In: 1025  Time Out: [place] [address], RT    Future Appointments   Date Time Provider Barrie Lynne   2/22/2021 10:30 AM Judith Bain, RT [facility] AND [facility]   2/25/2021 10:30 AM Judith Bain, RT [facility]   3/1/2021 10:30 AM Judith Bain, RT [facility]   3/4/2021 10:30 AM Judith Bain, RT [facility]   3/30/2021 11:00 AM Eunice Hanson NP POAP POA   6/15/2021  9:50 AM Wan Esquivel MD SSA FVP FVP

## 2021-02-25 ENCOUNTER — HOSPITAL ENCOUNTER (OUTPATIENT)
Dept: PHYSICAL THERAPY | Age: 66
Discharge: HOME | End: 2021-02-25
Payer: MEDICARE

## 2021-02-25 PROCEDURE — 97140 MANUAL THERAPY 1/> REGIONS: CPT

## 2021-02-25 PROCEDURE — 97035 APP MDLTY 1+ULTRASOUND EA 15: CPT

## 2021-02-25 PROCEDURE — 97110 THERAPEUTIC EXERCISES: CPT

## 2021-02-25 NOTE — PROGRESS NOTES
Chapo Pritchett  : 1955  Payor: Susy Govea / Plan: Via [employer] 21 / Product Type: Managed Care Medicare /  [address] at 4 [facility]. [facility], [address] [place] [address], [facility], [address] [place] [address]  [phone]   [phone]                                                          Paola Hanson, NP      OUTPATIENT PHYSICAL THERAPY: Daily Treatment Note 2021 Visit Count:  7    Tx Diagnosis:  Pain in right hand (M79.641)  Stiffness of right hand, not elsewhere classified (M25.641)        Pre-treatment Symptoms/Complaints: saw MD to wear splint at night and strenuous activities for one more month. Did not go to bed until 4 am.  Pain: Initial:0/10 Post Session: 0/10   Medications Last Reviewed:  2021     Updated Objective Findings: full MP 3rd finger extension flexion to 70. IP flexion full        TREATMENT:   THERAPEUTIC EXERCISE: (8 minutes):  Exercises per grid below to improve mobility, strength and balance. Required minimal visual, verbal and manual cues to promote proper body alignment and promote proper body posture. Progressed resistance and complexity of movement as indicated. Date:  2021 Date:  21 Date:  21 Date  2/15/21 Date  21 Date  21 Date   21   Activity/Exercise Parameters Parameters Parameters       Education HEP, POC, PT goals, anatomy/pathology, splint use percations Review of HEP, splint wear Review HEP Update HEP tendon glides all Joint protection principles     Finger ext hand flat 10  10 x 2 2 x 10 2 x 10      Hook fist 10 10 2 x 10 2 x 10      Short arc MP flex to 30  10 2 x 10       Finger extension rolling   3 min review 5 min 5 min  5 min   Intrinsic plus    2 x 10      Composite fist    10 x Bean grab 5 min      Individual finger extension     10 x all  10 x all  10 x           THERAPEUTIC ACTIVITY: ( 0 minutes):  Activities per gid below to improve functional movement related mobility, strength and balance to improve neuro-muscular carryover to daily functional activities for improving patient's quality of life. Required visual, verbal and manual cues to promote proper body alignment and promote proper body posture/mechanics. Progressed resistance and complexity of movement as indicated. Date:  2/25/2021 Date:   Date:     Activity/Exercise Parameters Parameters Parameters                                                                               MANUAL THERAPY: (15 minutes): Joint mobilization, Soft tissue mobilization was utilized and necessary because of the patient's restricted joint motion and restricted motion of soft tissue mobility. Date  2/25/2021    Technique Used Grade  Level # Time(s) Effect while being performed   Scar massage     Decrease pain improve mobility   Joint mobs 1-2   Fingers increase mobility                                        MODALITIES: ( 8 minutes): *  Ultrasound was used today secondary to the patient having tightened structures limiting joint motion that require an increase in extensibility. Ultrasound was used today to increase tendon flexibility. 1.0 w/cm continuous. Heat prior to US for 8 min. HEP Log Date 1. Active extension palm flat 1/21/2021   2. Hook fist  1/21/2021   3. MP flexion 0/30 1/21/2021   4 finger extension rolling  2/11/21   5. [email or web address] Portal  Treatment/Session Summary:    Response to Treatment: Pt demonstrated understanding of POC and initial HEP. No increase in pain or adverse reactions. Communication/Consultation:  Patient to follow MD instructions for next month. Equipment provided today: none     Recommendations/Intent for next treatment session:   Next visit will focus on Flexion-based Exercises Extension-Based Exercises (EOTA). Per protocol in chart           Treatment Plan of Care Effective Dates: 1/21/2021 TO 3/23/2021 (60 days).   Frequency/Duration: 2 times a week for 60 Days             Total Treatment Billable Duration:  31 min Rx     Pranay Rota, RT    Future Appointments   Date Time Provider Barrie Guera   3/1/2021 10:30 AM Papenfuss[facility] Sacks[facility]   3/4/2021 10:30 AM Papenfuss[facility] Sacks[facility]   3/30/2021 11:00 AM Vikki Hanson, NP POAP POA   6/15/2021  9:50 AM Felisha Bowser MD SSA [facility]

## 2021-03-01 ENCOUNTER — HOSPITAL ENCOUNTER (OUTPATIENT)
Dept: PHYSICAL THERAPY | Age: 66
Discharge: HOME | End: 2021-03-01
Payer: MEDICARE

## 2021-03-01 PROCEDURE — 97140 MANUAL THERAPY 1/> REGIONS: CPT

## 2021-03-01 PROCEDURE — 97110 THERAPEUTIC EXERCISES: CPT

## 2021-03-01 PROCEDURE — 97035 APP MDLTY 1+ULTRASOUND EA 15: CPT

## 2021-03-01 NOTE — PROGRESS NOTES
Manny Muñoz  : 1955  Payor: Allyson Garcia / Plan: Via [employer] 21 / Product Type: Managed Care Medicare /  [facility] [address] [place] [address]. [facility], [address] [place] [address], [address] [place] [address]  [phone]   [phone]                                                          Austyn Hanson NP      OUTPATIENT PHYSICAL THERAPY: Daily Treatment Note 3/1/2021 Visit Count:  8    Tx Diagnosis:  Pain in right hand (M79.641)  Stiffness of right hand, not elsewhere classified (M25.641)        Pre-treatment Symptoms/Complaints: saw MD to wear splint at night and strenuous activities for one more month. Did not go to bed until 4 am.  Pain: Initial:0/10 Post Session: 0/10   Medications Last Reviewed:  3/1/2021     Updated Objective Findings: full composite fist today    : right 35 lbs, left 55 lbs        TREATMENT:   THERAPEUTIC EXERCISE: (10 minutes):  Exercises per grid below to improve mobility, strength and balance. Required minimal visual, verbal and manual cues to promote proper body alignment and promote proper body posture. Progressed resistance and complexity of movement as indicated.      Date:  2021 Date:  21 Date:  21 Date  2/15/21 Date  21 Date  21 Date   21 Date  3/1/21   Activity/Exercise Parameters Parameters Parameters        Education HEP, POC, PT goals, anatomy/pathology, splint use percations Review of HEP, splint wear Review HEP Update HEP tendon glides all Joint protection principles      Finger ext hand flat 10  10 x 2 2 x 10 2 x 10       Hook fist 10 10 2 x 10 2 x 10    With orange putty   Short arc MP flex to 30  10 2 x 10        Finger extension rolling   3 min review 5 min 5 min  5 min    Intrinsic plus    2 x 10       Composite fist    10 x Bean grab 5 min    With orange putty    Individual finger extension     10 x all  10 x all  10 x    [place] ball rolling        4 min           THERAPEUTIC ACTIVITY: ( 0 minutes): Activities per gid below to improve functional movement related mobility, strength and balance to improve neuro-muscular carryover to daily functional activities for improving patient's quality of life. Required visual, verbal and manual cues to promote proper body alignment and promote proper body posture/mechanics. Progressed resistance and complexity of movement as indicated. Date:  3/1/2021 Date:   Date:     Activity/Exercise Parameters Parameters Parameters                                                                               MANUAL THERAPY: (15 minutes): Joint mobilization, Soft tissue mobilization was utilized and necessary because of the patient's restricted joint motion and restricted motion of soft tissue mobility. Date  3/1/2021    Technique Used Grade  Level # Time(s) Effect while being performed   Scar massage     Decrease pain improve mobility   Joint mobs 1-2   Fingers increase mobility                                        MODALITIES: ( 8 minutes): *  Ultrasound was used today secondary to the patient having tightened structures limiting joint motion that require an increase in extensibility. Ultrasound was used today to increase tendon flexibility. 1.0 w/cm continuous. Heat prior to US for 8 min. HEP Log Date 1. Active extension palm flat 1/21/2021   2. Hook fist  1/21/2021   3. MP flexion 0/30 1/21/2021   4 finger extension rolling  2/11/21   5. Putty ex for home update hook fist and gentle composite griop 3/1/21         [employer] Portal  Treatment/Session Summary:    Response to Treatment: Mild soreness 3rd MPJ   Communication/Consultation:  Patient to follow MD instructions for next month. Add gripping ex to HEP sub max   Equipment provided today: none     Recommendations/Intent for next treatment session:   Next visit will focus on Flexion-based Exercises Extension-Based Exercises (EOTA).  Per protocol in chart Treatment Plan of Care Effective Dates: 1/21/2021 TO 3/23/2021 (60 days).   Frequency/Duration: 2 times a week for 60 Days             Total Treatment Billable Duration: 33 min Rx, un timed 8 min  PT Patient Time In/Time Out  Time In: 1030  Time Out: 1115  Colette Crowley RT    Future Appointments   Date Time Provider Barrie Lynne   3/1/2021 10:30 AM Yoselin Bain, RT [facility]   3/4/2021 10:30 AM Yoselin Bain, RT [facility]   3/30/2021 11:00 AM Werner Hanson, NP POAP POA   6/15/2021  9:50 AM Vicki Cmap MD SSA [facility]

## 2021-03-04 ENCOUNTER — HOSPITAL ENCOUNTER (OUTPATIENT)
Dept: PHYSICAL THERAPY | Age: 66
Discharge: HOME | End: 2021-03-04
Payer: MEDICARE

## 2021-03-04 PROCEDURE — 97110 THERAPEUTIC EXERCISES: CPT

## 2021-03-04 PROCEDURE — 97140 MANUAL THERAPY 1/> REGIONS: CPT

## 2021-03-04 PROCEDURE — 97035 APP MDLTY 1+ULTRASOUND EA 15: CPT

## 2021-03-04 NOTE — PROGRESS NOTES
Gonsalo Quinn  : 1955  Payor: Valerie Stubbs / Plan: Marga Lee / Product Type: Managed Care Medicare /  [address] [place] [address] at [address] [place] [address]. [facility], [address] [place] [address]  [phone]   [phone]                                                          Jordyn Hanson NP      OUTPATIENT PHYSICAL THERAPY: Daily Treatment Note 3/4/2021 Visit Count:  9    Tx Diagnosis:  Pain in right hand (M79.641)  Stiffness of right hand, not elsewhere classified (M25.641)        Pre-treatment Symptoms/Complaints: saw MD to wear splint at night and strenuous activities for one more month. Did not go to bed until 4 am.  Pain: Initial:0/10 Post Session: 0/10   Medications Last Reviewed:  3/4/2021     Updated Objective Findings: full composite fist today    : right 35 lbs, left 55 lbs        TREATMENT:   THERAPEUTIC EXERCISE: (8 minutes):  Exercises per grid below to improve mobility, strength and balance. Required minimal visual, verbal and manual cues to promote proper body alignment and promote proper body posture. Progressed resistance and complexity of movement as indicated.      Date:  2021 Date:  21 Date:  21 Date  2/15/21 Date  21 Date  21 Date   21 Date  3/1/21 Date  3/4/21   Activity/Exercise Parameters Parameters Parameters         Education HEP, POC, PT goals, anatomy/pathology, splint use percations Review of HEP, splint wear Review HEP Update HEP tendon glides all Joint protection principles    Joint protection   Finger ext hand flat 10  10 x 2 2 x 10 2 x 10        Hook fist 10 10 2 x 10 2 x 10    With orange putty Orange putty   Short arc MP flex to 30  10 2 x 10         Finger extension rolling   3 min review 5 min 5 min  5 min     Intrinsic plus    2 x 10        Composite fist    10 x Bean grab 5 min    With orange putty  Orange putty   Individual finger extension     10 x all  10 x all  10 x     [place] ball rolling        4 min    Finger adduction         Orange putty           THERAPEUTIC ACTIVITY: ( 0 minutes): Activities per gid below to improve functional movement related mobility, strength and balance to improve neuro-muscular carryover to daily functional activities for improving patient's quality of life. Required visual, verbal and manual cues to promote proper body alignment and promote proper body posture/mechanics. Progressed resistance and complexity of movement as indicated. Date:  3/4/2021 Date:   Date:     Activity/Exercise Parameters Parameters Parameters                                                                               MANUAL THERAPY: (15 minutes): Joint mobilization, Soft tissue mobilization was utilized and necessary because of the patient's restricted joint motion and restricted motion of soft tissue mobility. Date  3/4/2021    Technique Used Grade  Level # Time(s) Effect while being performed   Scar massage     Decrease pain improve mobility   Joint mobs 1-2   Fingers increase mobility                                        MODALITIES: ( 8 minutes): *  Ultrasound was used today secondary to the patient having tightened structures limiting joint motion that require an increase in extensibility. Ultrasound was used today to increase tendon flexibility. 1.0 w/cm continuous. Heat prior to US for 8 min. HEP Log Date 1. Active extension palm flat 1/21/2021   2. Hook fist  1/21/2021   3. MP flexion 0/30 1/21/2021   4 finger extension rolling  2/11/21   5.  Putty ex for home update hook fist and gentle composite griop 3/1/21         [employer] Portal  Treatment/Session Summary:    Response to Treatment: Mild soreness 3rd MPJ   Communication/Consultation:  Finger adduction putty ex   Equipment provided today: none     Recommendations/Intent for next treatment session:   Next visit will focus on Flexion-based Exercises Extension-Based Exercises (EOTA). Per protocol in chart           Treatment Plan of Care Effective Dates: 1/21/2021 TO 3/23/2021 (60 days).   Frequency/Duration: 2 times a week for 60 Days             Total Treatment Billable Duration:31 min Rx, un timed 8 min  PT Patient Time In/Time Out  Time In: 5379  Time Out: 1212 [facility], RT    Future Appointments   Date Time Provider Barrie Lynne   3/4/2021 10:30 AM Papenfuss, Ceferino Elkins, RT [facility]   3/8/2021  9:00 AM Papenfuss, Ceferino Elkins, RT [facility]   3/15/2021  9:00 AM Papenfuss, Ceferino Elkins, RT [facility]   3/22/2021  9:00 AM Papenfuss, Ceferino Elkins, RT [facility]   3/29/2021  9:00 AM Papenfuss, Ceferino Elkins, RT [facility]   3/30/2021 11:00 AM Flor Hanson, NP POAP POA   6/15/2021  9:50 AM Isiah Narayanan MD SSA FVP F[facility]

## 2021-03-08 ENCOUNTER — HOSPITAL ENCOUNTER (OUTPATIENT)
Dept: PHYSICAL THERAPY | Age: 66
Discharge: HOME | End: 2021-03-08
Payer: MEDICARE

## 2021-03-08 PROCEDURE — 97140 MANUAL THERAPY 1/> REGIONS: CPT

## 2021-03-08 PROCEDURE — 97110 THERAPEUTIC EXERCISES: CPT

## 2021-03-08 PROCEDURE — 97035 APP MDLTY 1+ULTRASOUND EA 15: CPT

## 2021-03-08 NOTE — PROGRESS NOTES
Kallie Cortez  : 1955  Payor: Emperatriz Bella / Plan: Via [employer] / Product Type: Managed Care Medicare /  [address]. [facility], [address] [place] [address], [facility], [address] [place] [address]  [phone]   [phone]                                                          Yanique Hanson NP      OUTPATIENT PHYSICAL THERAPY: Daily Treatment Note 3/8/2021 Visit Count:  10    Tx Diagnosis:  Pain in right hand (M79.641)  Stiffness of right hand, not elsewhere classified (M25.641)        Pre-treatment Symptoms/Complaints: soreness is getting much better  Pain: Initial:0/10 Post Session: 0/10   Medications Last Reviewed:  3/8/2021     Updated Objective Findings: full composite fist today    : right 35 lbs, left 55 lbs        TREATMENT:   THERAPEUTIC EXERCISE: (15 minutes):  Exercises per grid below to improve mobility, strength and balance. Required minimal visual, verbal and manual cues to promote proper body alignment and promote proper body posture. Progressed resistance and complexity of movement as indicated.      Date:  2021 Date:  21 Date:  21 Date  2/15/21 Date  21 Date  21 Date   21 Date  3/1/21 Date  3/4/21 Date  3/8/21   Activity/Exercise Parameters Parameters Parameters          Education HEP, POC, PT goals, anatomy/pathology, splint use percations Review of HEP, splint wear Review HEP Update HEP tendon glides all Joint protection principles    Joint protection    Finger ext hand flat 10  10 x 2 2 x 10 2 x 10         Hook fist 10 10 2 x 10 2 x 10    With orange putty Orange putty    Short arc MP flex to 30  10 2 x 10          Finger extension rolling   3 min review 5 min 5 min  5 min      Intrinsic plus    2 x 10         Composite fist    10 x Bean grab 5 min    With orange putty  Orange putty    Individual finger extension     10 x all  10 x all  10 x      [place] ball rolling        4 min     Finger adduction    Orange putty Orange putty 10 sec hold 10 relax 10 x   UBE          3/3 L 3   Rows with tubing          3 x 10 blue   Punches with tubing          3 x 10 blue           THERAPEUTIC ACTIVITY: ( 0 minutes): Activities per gid below to improve functional movement related mobility, strength and balance to improve neuro-muscular carryover to daily functional activities for improving patient's quality of life. Required visual, verbal and manual cues to promote proper body alignment and promote proper body posture/mechanics. Progressed resistance and complexity of movement as indicated.     Date:  3/8/2021 Date:   Date:     Activity/Exercise Parameters Parameters Parameters •    •  •  •  •    •  •  •  •    •  •  •  •    •  •  •  •    •  •  •  •    •  •  •  •    •  •  •          MANUAL THERAPY: (15 minutes): Joint mobilization, Soft tissue mobilization was utilized and necessary because of the patient's restricted joint motion and restricted motion of soft tissue mobility.        Date  3/8/2021    Technique Used Grade  Level # Time(s) Effect while being performed   Scar massage     Decrease pain improve mobility   Joint mobs 1-2   Fingers increase mobility                                        MODALITIES: ( 8 minutes):      *  Ultrasound was used today secondary to the patient having tightened structures limiting joint motion that require an increase in extensibility. Ultrasound was used today to increase tendon flexibility. 1.0 w/cm continuous. Heat prior to US for 8 min.               HEP Log Date 1.   Active extension palm flat 1/21/2021   2. Hook fist  1/21/2021   3.MP flexion 0/30 1/21/2021   4 finger extension rolling  2/11/21   5. Putty ex for home update hook fist and gentle composite griop 3/1/21         [facility]  Treatment/Session Summary:    Response to Treatment: No pain with progression of progam   Communication/Consultation:  Finger adduction putty ex continue   Equipment provided today: none  Recommendations/Intent for next treatment session:   Next visit will focus on Flexion-based Exercises Extension-Based Exercises (EOTA). Per protocol in chart           Treatment Plan of Care Effective Dates: 1/21/2021 TO 3/23/2021 (60 days).   Frequency/Duration: 2 times a week for 60 Days             Total Treatment Billable Duration:38 min  PT Patient Time In/Time Out  Time In: 0910  Time Out: Miguel Ángel 65, RT    Future Appointments   Date Time Provider Barrie Lynne   3/15/2021  9:00 AM Ryan Bain, RT [facility]   3/22/2021  9:00 AM PapRyan fountain, RT [facility]   3/29/2021  9:00 AM PapRyan fountain, RT [facility]   3/30/2021 11:00 AM Austyn Hanson, NP [facility]AP [facility]A   6/15/2021  9:50 AM Miguel Morelos MD [facility]

## 2021-03-15 ENCOUNTER — HOSPITAL ENCOUNTER (OUTPATIENT)
Dept: PHYSICAL THERAPY | Age: 66
Discharge: HOME | End: 2021-03-15
Payer: MEDICARE

## 2021-03-15 PROCEDURE — 97110 THERAPEUTIC EXERCISES: CPT

## 2021-03-15 PROCEDURE — 97035 APP MDLTY 1+ULTRASOUND EA 15: CPT

## 2021-03-15 NOTE — PROGRESS NOTES
Mary Grace Pelaez  : 1955  Payor: Flora Michaud / Plan: Via [facility]  / Product Type: Managed Care Medicare /  [address] [place] [address] at [address] [place] [address]. [facility], [address] [place] [address], [address] [place] [address]  [phone]   [phone]                                                          Rik Hanson NP      OUTPATIENT PHYSICAL THERAPY: Daily Treatment Note 3/15/2021 Visit Count:  11    Tx Diagnosis:  Pain in right hand (M79.641)  Stiffness of right hand, not elsewhere classified (M25.641)        Pre-treatment Symptoms/Complaints: soreness is getting much better  Pain: Initial:0/10 Post Session: 0/10   Medications Last Reviewed:  3/15/2021     Updated Objective Findings: full composite fist today    : right 46 lbs, left 55 lbs        TREATMENT:   THERAPEUTIC EXERCISE: 30 minutes):  Exercises per grid below to improve mobility, strength and balance. Required minimal visual, verbal and manual cues to promote proper body alignment and promote proper body posture. Progressed resistance and complexity of movement as indicated.      Date:  2021 Date:  21 Date:  21 Date  2/15/21 Date  21 Date  21 Date   21 Date  3/1/21 Date  3/4/21 Date  3/8/21 Date  3/15/21   Activity/Exercise Parameters Parameters Parameters           Education HEP, POC, PT goals, anatomy/pathology, splint use percations Review of HEP, splint wear Review HEP Update HEP tendon glides all Joint protection principles    Joint protection     Finger ext hand flat 10  10 x 2 2 x 10 2 x 10          Hook fist 10 10 2 x 10 2 x 10    With orange putty Orange putty     Short arc MP flex to 30  10 2 x 10           Finger extension rolling   3 min review 5 min 5 min  5 min       Intrinsic plus    2 x 10          Composite fist    10 x Bean grab 5 min    With orange putty  Orange putty     Individual finger extension     10 x all  10 x all  10 x       [place] ball rolling        4 min Finger adduction         Orange putty Orange putty 10 sec hold 10 relax 10 x    UBE          3/3 L 3 6 min hills L 4   Rows with tubing          3 x 10 blue 3 x 10 blue   Punches with tubing          3 x 10 blue 3 x 10 blue   Reece carry           5# 280'   Overhead press            4# bar 2 x 10           THERAPEUTIC ACTIVITY: ( 0 minutes): Activities per gid below to improve functional movement related mobility, strength and balance to improve neuro-muscular carryover to daily functional activities for improving patient's quality of life. Required visual, verbal and manual cues to promote proper body alignment and promote proper body posture/mechanics. Progressed resistance and complexity of movement as indicated. Date:  3/15/2021 Date:   Date:     Activity/Exercise Parameters Parameters Parameters                                                                               MANUAL THERAPY: (0 minutes): Joint mobilization, Soft tissue mobilization was utilized and necessary because of the patient's restricted joint motion and restricted motion of soft tissue mobility. Date  3/15/2021    Technique Used Grade  Level # Time(s) Effect while being performed   Scar massage     Decrease pain improve mobility   Joint mobs 1-2   Fingers increase mobility                                        MODALITIES: ( 8 minutes): *  Ultrasound was used today secondary to the patient having tightened structures limiting joint motion that require an increase in extensibility. Ultrasound was used today to increase tendon flexibility. 1.0 w/cm continuous. Heat prior to US for 8 min. HEP Log Date 1. Active extension palm flat 1/21/2021   2. Hook fist  1/21/2021   3. MP flexion 0/30 1/21/2021   4 finger extension rolling  2/11/21   5.  Putty ex for home update hook fist and gentle composite griop 3/1/21         [facility] Portal  Treatment/Session Summary:    Response to Treatment: No pain with progression of progam.  increased by 9 lbs from last visit.   Communication/Consultation:  Finger adduction putty ex continue at home.   Equipment provided today: none     Recommendations/Intent for next treatment session:   Next visit will focus on Flexion-based Exercises Extension-Based Exercises (EOTA). Per protocol in chart           Treatment Plan of Care Effective Dates: 1/21/2021 TO 3/23/2021 (60 days).  Frequency/Duration: 2 times a week for 60 Days             Total Treatment Billable Duration:38 min  PT Patient Time In/Time Out  Time In: 0900  Time Out: 0945  Yessenia Bain,     Future Appointments   Date Time Provider Department Center   3/22/2021  9:00 AM Yessenia Bain, RT [facility]   3/29/2021  9:00 AM Yessenia Bain, RT [facility]   3/30/2021 11:00 AM Ayleen Hanson NP POAP [facility]A   6/15/2021  9:50 AM Gail Payne MD [facility]

## 2021-03-22 ENCOUNTER — HOSPITAL ENCOUNTER (OUTPATIENT)
Dept: PHYSICAL THERAPY | Age: 66
Discharge: HOME | End: 2021-03-22
Payer: MEDICARE

## 2021-03-22 PROCEDURE — 97110 THERAPEUTIC EXERCISES: CPT

## 2021-03-22 NOTE — PROGRESS NOTES
Rosa Mcmanus  : 1955  Payor: Patrick Metro / Plan: Jose Roberto Gilmore / Product Type: Managed Care Medicare /  [address]. [facility], [address] [place] [address], [facility], [address] [place] [address]  [phone]   [phone]                                                          Vikki Hanson NP      OUTPATIENT PHYSICAL THERAPY: Daily Treatment Note 3/22/2021 Visit Count:  12    Tx Diagnosis:  Pain in right hand (M79.641)  Stiffness of right hand, not elsewhere classified (M25.641)        Pre-treatment Symptoms/Complaints:I can do almost everything I need to   Pain: Initial:0/10 Post Session: 0/10   Medications Last Reviewed:  3/22/2021     Updated Objective Findings:  See DC note          TREATMENT:   THERAPEUTIC EXERCISE:25 minutes):  Exercises per grid below to improve mobility, strength and balance. Required minimal visual, verbal and manual cues to promote proper body alignment and promote proper body posture. Progressed resistance and complexity of movement as indicated.      Date:  2021 Date:  21 Date:  21 Date  2/15/21 Date  21 Date  21 Date   21 Date  3/1/21 Date  3/4/21 Date  3/8/21 Date  3/15/21 Date  3/22/21   Activity/Exercise Parameters Parameters Parameters            Education HEP, POC, PT goals, anatomy/pathology, splint use percations Review of HEP, splint wear Review HEP Update HEP tendon glides all Joint protection principles    Joint protection   DC HEP   Finger ext hand flat 10  10 x 2 2 x 10 2 x 10           Hook fist 10 10 2 x 10 2 x 10    With orange putty Orange putty      Short arc MP flex to 30  10 2 x 10            Finger extension rolling   3 min review 5 min 5 min  5 min        Intrinsic plus    2 x 10           Composite fist    10 x Bean grab 5 min    With orange putty  Orange putty      Individual finger extension     10 x all  10 x all  10 x        [place] ball rolling        4 min       Finger adduction         Orange putty Orange putty 10 sec hold 10 relax 10 x     UBE          3/3 L 3 6 min hills L 4 6 min L 5   Rows with tubing          3 x 10 blue 3 x 10 blue    Punches with tubing          3 x 10 blue 3 x 10 blue    Reece carry           5# 280'    Overhead press            4# bar 2 x 10 4# 2 x 10   scaption            10 x 2#   Bicep curls            10 x 3 #   tricep press            10 x 2#   Shoulder flexion            10 x 2           THERAPEUTIC ACTIVITY: ( 0 minutes): Activities per gid below to improve functional movement related mobility, strength and balance to improve neuro-muscular carryover to daily functional activities for improving patient's quality of life. Required visual, verbal and manual cues to promote proper body alignment and promote proper body posture/mechanics. Progressed resistance and complexity of movement as indicated. Date:  3/22/2021 Date:   Date:     Activity/Exercise Parameters Parameters Parameters                                                                               MANUAL THERAPY: (0 minutes): Joint mobilization, Soft tissue mobilization was utilized and necessary because of the patient's restricted joint motion and restricted motion of soft tissue mobility. Date  3/22/2021    Technique Used Grade  Level # Time(s) Effect while being performed   Scar massage     Decrease pain improve mobility   Joint mobs 1-2   Fingers increase mobility                                        MODALITIES: 10  minutes): parafin prior to ex               HEP Log Date 1. Active extension palm flat 1/21/2021   2. Hook fist  1/21/2021   3. MP flexion 0/30 1/21/2021   4 finger extension rolling  2/11/21   5. Putty ex for home update hook fist and gentle composite griop 3/1/21         [facility]  Treatment/Session Summary:    Response to Treatment: No pain with progression of progam.  increased by 9 lbs from last visit. Communication/Consultation:  Finger adduction putty ex continue at home. Equipment provided today: none     Recommendations DC to HEP         Treatment Plan of Care Effective Dates: 1/21/2021 TO 3/23/2021 (60 days).   Frequency/Duration: 2 times a week for 60 Days             Total Treatment Billable Duration:25 min     Carlos Armstrong RT    Future Appointments   Date Time Provider Barrie Lynne   4/14/2021  8:30 AM Kourtney Herman MD POAP POA   6/15/2021  9:50 AM Amilcar Ray MD [facility]
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
## 2021-03-29 ENCOUNTER — APPOINTMENT (OUTPATIENT)
Dept: PHYSICAL THERAPY | Age: 66
End: 2021-03-29
Payer: MEDICARE

## 2021-04-14 PROBLEM — M66.241 SAGITTAL BAND RUPTURE, EXTENSOR TENDON, NONTRAUMATIC, RIGHT: Status: ACTIVE | Noted: 2021-04-14

## 2021-08-03 PROBLEM — E66.01 MORBID OBESITY (HCC): Status: RESOLVED | Noted: 2020-12-17 | Resolved: 2021-08-03

## 2021-08-30 PROBLEM — N39.41 URGE INCONTINENCE OF URINE: Status: ACTIVE | Noted: 2021-08-30

## 2021-10-13 PROBLEM — K21.9 GASTROESOPHAGEAL REFLUX DISEASE WITHOUT ESOPHAGITIS: Status: ACTIVE | Noted: 2021-10-13

## 2022-02-25 LAB — HBA1C MFR BLD HPLC: 7.7 %

## 2022-03-18 PROBLEM — G47.33 OBSTRUCTIVE SLEEP APNEA SYNDROME: Status: ACTIVE | Noted: 2019-09-24

## 2022-03-18 PROBLEM — M15.9 PRIMARY OSTEOARTHRITIS INVOLVING MULTIPLE JOINTS: Status: ACTIVE | Noted: 2018-06-11

## 2022-03-18 PROBLEM — E11.9 DIABETES MELLITUS WITHOUT COMPLICATION (HCC): Status: ACTIVE | Noted: 2017-11-14

## 2022-03-18 PROBLEM — L43.9 LICHEN PLANUS: Status: ACTIVE | Noted: 2019-03-25

## 2022-03-18 PROBLEM — M15.0 PRIMARY OSTEOARTHRITIS INVOLVING MULTIPLE JOINTS: Status: ACTIVE | Noted: 2018-06-11

## 2022-03-18 PROBLEM — Z13.31 SCREENING FOR DEPRESSION: Status: ACTIVE | Noted: 2020-12-17

## 2022-03-19 PROBLEM — F51.04 PSYCHOPHYSIOLOGICAL INSOMNIA: Status: ACTIVE | Noted: 2019-09-24

## 2022-03-19 PROBLEM — J45.20 MILD INTERMITTENT ASTHMA WITHOUT COMPLICATION: Status: ACTIVE | Noted: 2018-11-19

## 2022-03-19 PROBLEM — G25.81 RESTLESS LEG SYNDROME: Status: ACTIVE | Noted: 2017-12-05

## 2022-03-19 PROBLEM — N39.41 URGE INCONTINENCE OF URINE: Status: ACTIVE | Noted: 2021-08-30

## 2022-03-19 PROBLEM — Z78.0 POSTMENOPAUSAL STATE: Status: ACTIVE | Noted: 2020-12-17

## 2022-03-19 PROBLEM — K59.09 CHRONIC CONSTIPATION: Status: ACTIVE | Noted: 2019-10-10

## 2022-03-19 PROBLEM — F33.0 MILD EPISODE OF RECURRENT MAJOR DEPRESSIVE DISORDER (HCC): Status: ACTIVE | Noted: 2019-09-24

## 2022-03-19 PROBLEM — E11.40 CONTROLLED TYPE 2 DIABETES WITH NEUROPATHY (HCC): Status: ACTIVE | Noted: 2019-07-22

## 2022-03-19 PROBLEM — R06.00 DYSPNEA: Status: ACTIVE | Noted: 2019-10-10

## 2022-03-19 PROBLEM — J30.89 ENVIRONMENTAL AND SEASONAL ALLERGIES: Status: ACTIVE | Noted: 2017-11-14

## 2022-03-19 PROBLEM — K21.9 GASTROESOPHAGEAL REFLUX DISEASE WITHOUT ESOPHAGITIS: Status: ACTIVE | Noted: 2021-10-13

## 2022-03-19 PROBLEM — E66.9 OBESITY (BMI 30-39.9): Status: ACTIVE | Noted: 2017-12-05

## 2022-03-19 PROBLEM — E66.01 SEVERE OBESITY (HCC): Status: ACTIVE | Noted: 2019-03-25

## 2022-03-19 PROBLEM — K21.00 GASTROESOPHAGEAL REFLUX DISEASE WITH ESOPHAGITIS: Status: ACTIVE | Noted: 2019-10-10

## 2022-03-19 PROBLEM — G62.9 PERIPHERAL POLYNEUROPATHY: Status: ACTIVE | Noted: 2017-12-05

## 2022-03-19 PROBLEM — E11.40 TYPE 2 DIABETES MELLITUS WITH DIABETIC NEUROPATHY (HCC): Status: ACTIVE | Noted: 2019-11-06

## 2022-03-20 PROBLEM — M66.241 SAGITTAL BAND RUPTURE, EXTENSOR TENDON, NONTRAUMATIC, RIGHT: Status: ACTIVE | Noted: 2021-04-14

## 2022-03-20 PROBLEM — Z13.39 SCREENING FOR ALCOHOLISM: Status: ACTIVE | Noted: 2020-12-17

## 2022-03-20 PROBLEM — F41.1 GAD (GENERALIZED ANXIETY DISORDER): Status: ACTIVE | Noted: 2019-09-24

## 2022-05-31 ENCOUNTER — HOSPITAL ENCOUNTER (OUTPATIENT)
Dept: MAMMOGRAPHY | Age: 67
Discharge: HOME | End: 2022-06-03
Payer: MEDICARE

## 2022-05-31 DIAGNOSIS — Z78.0 POSTMENOPAUSAL STATE: ICD-10-CM

## 2022-05-31 PROCEDURE — 77080 DXA BONE DENSITY AXIAL: CPT

## 2022-08-08 RX ORDER — OMEPRAZOLE 40 MG/1
CAPSULE, DELAYED RELEASE ORAL
Qty: 90 CAPSULE | Refills: 1 | OUTPATIENT
Start: 2022-08-08

## 2022-08-17 ENCOUNTER — OFFICE VISIT (OUTPATIENT)
Dept: FAMILY MEDICINE CLINIC | Facility: CLINIC | Age: 67
End: 2022-08-17
Payer: MEDICARE

## 2022-08-17 VITALS
TEMPERATURE: 97 F | WEIGHT: 253 LBS | HEIGHT: 67 IN | BODY MASS INDEX: 39.71 KG/M2 | DIASTOLIC BLOOD PRESSURE: 70 MMHG | SYSTOLIC BLOOD PRESSURE: 110 MMHG | RESPIRATION RATE: 16 BRPM | HEART RATE: 71 BPM | OXYGEN SATURATION: 96 %

## 2022-08-17 DIAGNOSIS — E78.2 HYPERLIPEMIA, MIXED: ICD-10-CM

## 2022-08-17 DIAGNOSIS — G62.9 PERIPHERAL POLYNEUROPATHY: ICD-10-CM

## 2022-08-17 DIAGNOSIS — K21.9 GASTROESOPHAGEAL REFLUX DISEASE WITHOUT ESOPHAGITIS: Primary | ICD-10-CM

## 2022-08-17 DIAGNOSIS — I10 ESSENTIAL (PRIMARY) HYPERTENSION: ICD-10-CM

## 2022-08-17 DIAGNOSIS — G25.81 RESTLESS LEG SYNDROME: ICD-10-CM

## 2022-08-17 DIAGNOSIS — J45.20 MILD INTERMITTENT ASTHMA WITHOUT COMPLICATION: ICD-10-CM

## 2022-08-17 DIAGNOSIS — N39.41 URGE INCONTINENCE: ICD-10-CM

## 2022-08-17 DIAGNOSIS — E66.9 OBESITY (BMI 30-39.9): ICD-10-CM

## 2022-08-17 DIAGNOSIS — F41.1 GAD (GENERALIZED ANXIETY DISORDER): ICD-10-CM

## 2022-08-17 DIAGNOSIS — F51.04 PSYCHOPHYSIOLOGICAL INSOMNIA: ICD-10-CM

## 2022-08-17 LAB
BASOPHILS # BLD: 0 K/UL (ref 0–0.2)
BASOPHILS NFR BLD: 1 % (ref 0–2)
DIFFERENTIAL METHOD BLD: ABNORMAL
EOSINOPHIL # BLD: 0.3 K/UL (ref 0–0.8)
EOSINOPHIL NFR BLD: 4 % (ref 0.5–7.8)
ERYTHROCYTE [DISTWIDTH] IN BLOOD BY AUTOMATED COUNT: 15.9 % (ref 11.9–14.6)
HCT VFR BLD AUTO: 41.5 % (ref 35.8–46.3)
HGB BLD-MCNC: 12.3 G/DL (ref 11.7–15.4)
IMM GRANULOCYTES # BLD AUTO: 0 K/UL (ref 0–0.5)
IMM GRANULOCYTES NFR BLD AUTO: 0 % (ref 0–5)
LYMPHOCYTES # BLD: 1.9 K/UL (ref 0.5–4.6)
LYMPHOCYTES NFR BLD: 28 % (ref 13–44)
MCH RBC QN AUTO: 25.8 PG (ref 26.1–32.9)
MCHC RBC AUTO-ENTMCNC: 29.6 G/DL (ref 31.4–35)
MCV RBC AUTO: 87.2 FL (ref 79.6–97.8)
MONOCYTES # BLD: 0.3 K/UL (ref 0.1–1.3)
MONOCYTES NFR BLD: 5 % (ref 4–12)
NEUTS SEG # BLD: 4.3 K/UL (ref 1.7–8.2)
NEUTS SEG NFR BLD: 63 % (ref 43–78)
NRBC # BLD: 0 K/UL (ref 0–0.2)
PLATELET # BLD AUTO: 201 K/UL (ref 150–450)
PMV BLD AUTO: 10.5 FL (ref 9.4–12.3)
RBC # BLD AUTO: 4.76 M/UL (ref 4.05–5.2)
WBC # BLD AUTO: 6.9 K/UL (ref 4.3–11.1)

## 2022-08-17 PROCEDURE — 1123F ACP DISCUSS/DSCN MKR DOCD: CPT | Performed by: FAMILY MEDICINE

## 2022-08-17 PROCEDURE — 99215 OFFICE O/P EST HI 40 MIN: CPT | Performed by: FAMILY MEDICINE

## 2022-08-17 RX ORDER — TOLTERODINE TARTRATE 2 MG/1
2 TABLET, EXTENDED RELEASE ORAL 2 TIMES DAILY
Qty: 180 TABLET | Refills: 1 | Status: SHIPPED | OUTPATIENT
Start: 2022-08-17

## 2022-08-17 RX ORDER — ROPINIROLE 3 MG/1
3 TABLET, FILM COATED ORAL 2 TIMES DAILY
Qty: 180 TABLET | Refills: 1 | Status: SHIPPED | OUTPATIENT
Start: 2022-08-17

## 2022-08-17 RX ORDER — LOSARTAN POTASSIUM 100 MG/1
100 TABLET ORAL DAILY
Qty: 90 TABLET | Refills: 1 | Status: SHIPPED | OUTPATIENT
Start: 2022-08-17

## 2022-08-17 RX ORDER — HYDROXYCHLOROQUINE SULFATE 200 MG/1
TABLET, FILM COATED ORAL DAILY
COMMUNITY

## 2022-08-17 RX ORDER — ROSUVASTATIN CALCIUM 20 MG/1
20 TABLET, COATED ORAL DAILY
Qty: 90 TABLET | Refills: 1 | Status: SHIPPED | OUTPATIENT
Start: 2022-08-17

## 2022-08-17 RX ORDER — EMPAGLIFLOZIN 10 MG/1
TABLET, FILM COATED ORAL
COMMUNITY
Start: 2022-07-21

## 2022-08-17 RX ORDER — ALBUTEROL SULFATE 90 UG/1
2 AEROSOL, METERED RESPIRATORY (INHALATION) EVERY 6 HOURS PRN
Qty: 18 G | Refills: 1 | Status: SHIPPED | OUTPATIENT
Start: 2022-08-17

## 2022-08-17 RX ORDER — OMEPRAZOLE 40 MG/1
40 CAPSULE, DELAYED RELEASE ORAL DAILY
Qty: 90 CAPSULE | Refills: 1 | Status: SHIPPED | OUTPATIENT
Start: 2022-08-17

## 2022-08-17 RX ORDER — GABAPENTIN 400 MG/1
400 CAPSULE ORAL 2 TIMES DAILY
Qty: 180 CAPSULE | Refills: 1 | Status: CANCELLED | OUTPATIENT
Start: 2022-08-17 | End: 2022-09-16

## 2022-08-17 RX ORDER — FAMOTIDINE 40 MG/1
40 TABLET, FILM COATED ORAL 2 TIMES DAILY
Qty: 180 TABLET | Refills: 1 | Status: SHIPPED | OUTPATIENT
Start: 2022-08-17

## 2022-08-17 RX ORDER — VENLAFAXINE HYDROCHLORIDE 150 MG/1
150 CAPSULE, EXTENDED RELEASE ORAL DAILY
Qty: 90 CAPSULE | Refills: 1 | Status: SHIPPED | OUTPATIENT
Start: 2022-08-17

## 2022-08-17 ASSESSMENT — ENCOUNTER SYMPTOMS
SHORTNESS OF BREATH: 0
ABDOMINAL PAIN: 0
BLOOD IN STOOL: 0
SINUS PAIN: 0
COUGH: 0
ANAL BLEEDING: 0
VOMITING: 0
APNEA: 1
NAUSEA: 1
DIARRHEA: 0
WHEEZING: 0

## 2022-08-17 ASSESSMENT — PATIENT HEALTH QUESTIONNAIRE - PHQ9
1. LITTLE INTEREST OR PLEASURE IN DOING THINGS: 0
SUM OF ALL RESPONSES TO PHQ QUESTIONS 1-9: 0
2. FEELING DOWN, DEPRESSED OR HOPELESS: 0
SUM OF ALL RESPONSES TO PHQ9 QUESTIONS 1 & 2: 0

## 2022-08-17 NOTE — PROGRESS NOTES
Amy Nieto (:  1955) is a 74 y.o. female,Established patient, here for evaluation of the following chief complaint(s):  Medication Refill (Pt. Is here for med refills and labs)         ASSESSMENT/PLAN:  1. Gastroesophageal reflux disease without esophagitis  -     famotidine (PEPCID) 40 MG tablet; Take 1 tablet by mouth 2 times daily, Disp-180 tablet, R-1Normal  -     omeprazole (PRILOSEC) 40 MG delayed release capsule; Take 1 capsule by mouth daily, Disp-90 capsule, R-1Normal  -     [facility] - [facility]  2. Restless leg syndrome  -     rOPINIRole (REQUIP) 3 MG tablet; Take 1 tablet by mouth 2 times daily, Disp-180 tablet, R-1Normal  3. Mild intermittent asthma without complication  -     albuterol sulfate HFA (PROVENTIL;VENTOLIN;PROAIR) 108 (90 Base) MCG/ACT inhaler; Inhale 2 puffs into the lungs every 6 hours as needed for Wheezing, Disp-18 g, R-1Normal  -     fluticasone-salmeterol (ADVAIR HFA) 230-21 MCG/ACT inhaler; Inhale 2 puffs into the lungs 2 times daily, Disp-1 each, R-2Normal  4. Peripheral polyneuropathy  5. Hyperlipemia, mixed  -     rosuvastatin (CRESTOR) 20 MG tablet; Take 1 tablet by mouth daily Take 1 tab po daily with dinner, Disp-90 tablet, R-1Normal  -     Comprehensive Metabolic Panel; Future  -     Lipid Panel; Future  6. Urge incontinence  -     tolterodine (DETROL) 2 MG tablet; Take 1 tablet by mouth 2 times daily, Disp-180 tablet, R-1Normal  7. Essential (primary) hypertension  -     losartan (COZAAR) 100 MG tablet; Take 1 tablet by mouth daily, Disp-90 tablet, R-1Normal  -     CBC with Auto Differential; Future  -     Comprehensive Metabolic Panel; Future  8. Psychophysiological insomnia  -     venlafaxine (EFFEXOR XR) 150 MG extended release capsule; Take 1 capsule by mouth daily, Disp-90 capsule, R-1Normal  9. ROWDY (generalized anxiety disorder)  -     venlafaxine (EFFEXOR XR) 150 MG extended release capsule;  Take 1 capsule by mouth daily, Disp-90 capsule, R-1Normal  10. Obesity (BMI 30-39.9)  11. BMI 39.0-39.9,adult    Restarted Advair HFA as patient says they burn wood for heat in the winter and wants to have the HFA as a back up. Advised to avoid salt, monitor BP- keep a log. Referred to gastro for further evaluation of GERD. Patient refused referral to Urogyn. Advised to have her Endocrinologist refill her Gabapentin as they are treating her Diabetes. Advised to contact her CPAP people and have the machine fixed- to use the CPAP regularly; discussed the importance of this and the advantages to her health including for the fatigue. Advised patient to lose weight. Also advised to exercise regularly, to eat healthy foods, and to control portion sizes. 08/17/2022- I have spent 57 minutes reviewing previous notes, test results and face-to-face with the patient discussing the diagnosis and importance of compliance with the treatment plan as well as documenting on the day of the visit. Return in about 6 months (around 2/17/2023) for medication refills, fasting labs- 40 mins or prn sooner. Subjective   SUBJECTIVE/OBJECTIVE:  HPI  Patient comes today for her 6 month follow up and medication refills-  Chronic Asthma- she has never smoked before, her 1st  smoked 3 ppd but they were  for only 3 years; she was using Advair HFA bid but stopped- requests a refill today, tried Spiriva in the past; uses Albuterol HFA prn- less than once a month but a little more in the past 3 weeks because it was hot and muggy; denies issues with shortness of breath, cough or wheezing on a daily basis. Says her symptoms are worse with the pollen counts now, also heat and humidity bother her. Insomnia, Anxiety- ongoing for years; was diagnosed with anxiety in 5th grade- was put on anxiety medicine which helped, had breathing issues then. She denies any HI/ SI; does say that depression is not a problem now but still has some anxiety off and on.  She tapered and stopped the Pristiq  mg- (bothered her stomach but felt calmer); now takes Venlafaxine  mg - says it is working well, denies side effects, feels shaky at times (anxiety). She starts winding down at 7 pm, sleeps from 9 pm to 4 am; wakes up 2 times a night for about 30 mins- 1 hour each time- overall gets only 5-6 hours. [She stopped taking Benadryl 2 tabs at HS- feels it messes with her short term memory. She was getting some medication from pain management- was given Ambien to use prn- has used 1-2 tabs this year; has tried Dejuan Benton)- also took if she had not slept for 5-6 nights then it stopped working; Melatonin caused nightmares, Tylenol PM and Advil PM did not phase it at all. She stopped Trazodone 100 mg at HS- did not think it was helping; stopped Buspar 10mg tid - denies any side effects-  thinks she was allergic to it- says she could not swallow while on it. Says Cymbalta made her sleepy- slept 3 days and felt more depressed; cannot take Lexapro, Citalopram, Effexor- reacts with Plaquenil. Tried Doxepin 10mg at last OV; says she became very mean as a cat, her  wanted to divorce her, felt ill and irritable, stopped after taking it for 4 days.]     Neuropathy (peripheral with Diabetes), RLS- (She saw a Neurologist in the past but he retired- he did the nerve conduction studies for her neuropathy.) She takes Gabapentin 400 mg 2 caps bid- says her insurance does not cover for tid, says she has tingling, fire, shock like sensation from just above her knees b/l up to her toes b/l -medications help a lot. However the Gabapentin makes her unstable- does not climb ladders. She will now get it refill from her Endocrinologist. (Lyrica made her gain weight.) She also takes Requip 3 mg twice a day- says it has helped a lot, legs are not restless. She has Sleep apnea- has a CPAP machine- not using as something broke.     Overactive bladder- reports urgency, denies frequency - takes Joan Toledo, swelling. Gastrointestinal:  Positive for nausea. Negative for abdominal pain, anal bleeding, blood in stool, diarrhea and vomiting. Genitourinary:  Positive for urgency. Negative for difficulty urinating, dysuria, flank pain, frequency, hematuria and pelvic pain. Neurological:  Negative for weakness and numbness. Psychiatric/Behavioral:  Positive for sleep disturbance. Negative for decreased concentration, dysphoric mood, self-injury and suicidal ideas. The patient is nervous/anxious. The patient is not hyperactive. Objective   Physical Exam  Vitals and nursing note reviewed. Constitutional:       General: She is not in acute distress. Appearance: She is obese. HENT:      Mouth/Throat:      Mouth: Mucous membranes are moist.      Pharynx: Oropharynx is clear. Cardiovascular:      Rate and Rhythm: Normal rate and regular rhythm. Pulmonary:      Effort: Pulmonary effort is normal.      Breath sounds: Normal breath sounds. No wheezing. Abdominal:      General: Bowel sounds are normal. There is no distension. Palpations: Abdomen is soft. Tenderness: There is no abdominal tenderness. There is no right CVA tenderness or left CVA tenderness. Musculoskeletal:      Cervical back: Neck supple. No tenderness. Right lower leg: No edema. Left lower leg: No edema. Neurological:      Mental Status: She is alert. Psychiatric:      Comments: Minimally anxious                An electronic signature was used to authenticate this note.     --Nestor Aschoff, MD

## 2022-08-18 LAB
ALBUMIN SERPL-MCNC: 3.7 G/DL (ref 3.2–4.6)
ALBUMIN/GLOB SERPL: 1.2 {RATIO} (ref 1.2–3.5)
ALP SERPL-CCNC: 84 U/L (ref 50–136)
ALT SERPL-CCNC: 21 U/L (ref 12–65)
ANION GAP SERPL CALC-SCNC: 9 MMOL/L (ref 7–16)
AST SERPL-CCNC: 12 U/L (ref 15–37)
BILIRUB SERPL-MCNC: 0.2 MG/DL (ref 0.2–1.1)
BUN SERPL-MCNC: 13 MG/DL (ref 8–23)
CALCIUM SERPL-MCNC: 9.1 MG/DL (ref 8.3–10.4)
CHLORIDE SERPL-SCNC: 110 MMOL/L (ref 98–107)
CHOLEST SERPL-MCNC: 116 MG/DL
CO2 SERPL-SCNC: 25 MMOL/L (ref 21–32)
CREAT SERPL-MCNC: 1 MG/DL (ref 0.6–1)
GLOBULIN SER CALC-MCNC: 3.2 G/DL (ref 2.3–3.5)
GLUCOSE SERPL-MCNC: 81 MG/DL (ref 65–100)
HDLC SERPL-MCNC: 58 MG/DL (ref 40–60)
HDLC SERPL: 2 {RATIO}
LDLC SERPL CALC-MCNC: 36.4 MG/DL
POTASSIUM SERPL-SCNC: 4.2 MMOL/L (ref 3.5–5.1)
PROT SERPL-MCNC: 6.9 G/DL (ref 6.3–8.2)
SODIUM SERPL-SCNC: 144 MMOL/L (ref 136–145)
TRIGL SERPL-MCNC: 108 MG/DL (ref 35–150)
VLDLC SERPL CALC-MCNC: 21.6 MG/DL (ref 6–23)

## 2022-10-16 ENCOUNTER — TELEPHONE (OUTPATIENT)
Dept: FAMILY MEDICINE CLINIC | Facility: CLINIC | Age: 67
End: 2022-10-16

## 2022-10-17 NOTE — TELEPHONE ENCOUNTER
Patient did a FIT test through her insurance on 9/26/22- let her know it was negative, results sent for scanning.

## 2022-11-09 ENCOUNTER — OFFICE VISIT (OUTPATIENT)
Dept: FAMILY MEDICINE CLINIC | Facility: CLINIC | Age: 67
End: 2022-11-09
Payer: MEDICARE

## 2022-11-09 VITALS
BODY MASS INDEX: 39.24 KG/M2 | HEART RATE: 89 BPM | DIASTOLIC BLOOD PRESSURE: 74 MMHG | OXYGEN SATURATION: 96 % | WEIGHT: 250 LBS | HEIGHT: 67 IN | TEMPERATURE: 97.9 F | SYSTOLIC BLOOD PRESSURE: 128 MMHG | RESPIRATION RATE: 16 BRPM

## 2022-11-09 DIAGNOSIS — N30.01 ACUTE CYSTITIS WITH HEMATURIA: Primary | ICD-10-CM

## 2022-11-09 LAB
BILIRUBIN, URINE, POC: ABNORMAL
BLOOD URINE, POC: NEGATIVE
GLUCOSE URINE, POC: ABNORMAL
KETONES, URINE, POC: NEGATIVE
LEUKOCYTE ESTERASE, URINE, POC: NEGATIVE
NITRITE, URINE, POC: POSITIVE
PH, URINE, POC: 6 (ref 4.6–8)
PROTEIN,URINE, POC: NEGATIVE
SPECIFIC GRAVITY, URINE, POC: 1.02 (ref 1–1.03)
URINALYSIS CLARITY, POC: ABNORMAL
URINALYSIS COLOR, POC: YELLOW
UROBILINOGEN, POC: 0.2

## 2022-11-09 PROCEDURE — 1123F ACP DISCUSS/DSCN MKR DOCD: CPT | Performed by: FAMILY MEDICINE

## 2022-11-09 PROCEDURE — 3074F SYST BP LT 130 MM HG: CPT | Performed by: FAMILY MEDICINE

## 2022-11-09 PROCEDURE — 81003 URINALYSIS AUTO W/O SCOPE: CPT | Performed by: FAMILY MEDICINE

## 2022-11-09 PROCEDURE — 3078F DIAST BP <80 MM HG: CPT | Performed by: FAMILY MEDICINE

## 2022-11-09 PROCEDURE — 99213 OFFICE O/P EST LOW 20 MIN: CPT | Performed by: FAMILY MEDICINE

## 2022-11-09 RX ORDER — ONDANSETRON 4 MG/1
TABLET, ORALLY DISINTEGRATING ORAL
COMMUNITY
Start: 2022-10-24

## 2022-11-09 RX ORDER — CIPROFLOXACIN 250 MG/1
250 TABLET, FILM COATED ORAL 2 TIMES DAILY
Qty: 6 TABLET | Refills: 0 | Status: SHIPPED | OUTPATIENT
Start: 2022-11-09 | End: 2022-11-12

## 2022-11-09 RX ORDER — PANTOPRAZOLE SODIUM 40 MG/1
TABLET, DELAYED RELEASE ORAL
COMMUNITY
Start: 2022-10-24

## 2022-11-09 RX ORDER — EMPAGLIFLOZIN 25 MG/1
TABLET, FILM COATED ORAL
COMMUNITY
Start: 2022-10-25

## 2022-11-09 RX ORDER — PHENAZOPYRIDINE HYDROCHLORIDE 200 MG/1
200 TABLET, FILM COATED ORAL 3 TIMES DAILY PRN
Qty: 9 TABLET | Refills: 0 | Status: SHIPPED | OUTPATIENT
Start: 2022-11-09 | End: 2022-11-12

## 2022-11-09 ASSESSMENT — PATIENT HEALTH QUESTIONNAIRE - PHQ9
SUM OF ALL RESPONSES TO PHQ QUESTIONS 1-9: 0
SUM OF ALL RESPONSES TO PHQ9 QUESTIONS 1 & 2: 0
2. FEELING DOWN, DEPRESSED OR HOPELESS: 0
SUM OF ALL RESPONSES TO PHQ QUESTIONS 1-9: 0
1. LITTLE INTEREST OR PLEASURE IN DOING THINGS: 0

## 2022-11-09 ASSESSMENT — ENCOUNTER SYMPTOMS
WHEEZING: 0
VOMITING: 0
COUGH: 0
SINUS PAIN: 0
NAUSEA: 0
DIARRHEA: 0
SHORTNESS OF BREATH: 0
ABDOMINAL PAIN: 0

## 2022-11-09 NOTE — PROGRESS NOTES
Uma Garcia (:  1955) is a 74 y.o. female,Established patient, here for evaluation of the following chief complaint(s):  Urinary Tract Infection         ASSESSMENT/PLAN:  1. Acute cystitis with hematuria  -     ciprofloxacin (CIPRO) 250 MG tablet; Take 1 tablet by mouth 2 times daily for 3 days, Disp-6 tablet, R-0Normal  -     phenazopyridine (PYRIDIUM) 200 MG tablet; Take 1 tablet by mouth 3 times daily as needed for Pain, Disp-9 tablet, R-0Normal    UA- Nitr +, Bili +, Gluc 3+  Urine analysis was discussed with the patient. Advised patient to drink plenty of water, may also have cranberry supplement pills. Start Ciprofloxacin and Phenazopyridine- discussed possible side effects, advised to monitor for hypoglycemia. Patient was advised to call us or return to clinic in 48 hours if symptoms persist or worsen. Return if symptoms worsen or fail to improve. Subjective   SUBJECTIVE/OBJECTIVE:  Urinary Tract Infection  This is a new problem. Episode onset: 2 weeks. The problem has been gradually worsening since onset. Associated symptoms include hematuria (saw a blackish clot only once) and pain. (Cloudy urine, foul smelling- like dead fish) The pain is present in the urethra. Her pain is at a severity of 7/10 (Pain starts after urination- lasts for an hour after). Risk factors include diabetes. Review of Systems   Constitutional:  Negative for chills and fever. HENT:  Negative for congestion and sinus pain. Respiratory:  Negative for cough, shortness of breath and wheezing. Cardiovascular:  Negative for chest pain, palpitations and leg swelling. Gastrointestinal:  Negative for abdominal pain, diarrhea, nausea and vomiting. Genitourinary:  Positive for dysuria, hematuria (saw a blackish clot only once) and urgency. Negative for difficulty urinating, enuresis, flank pain and frequency. Objective   Physical Exam  Vitals and nursing note reviewed.    Constitutional: General: She is not in acute distress. Appearance: She is obese. HENT:      Mouth/Throat:      Mouth: Mucous membranes are moist.      Pharynx: Oropharynx is clear. Cardiovascular:      Rate and Rhythm: Normal rate and regular rhythm. Pulmonary:      Effort: Pulmonary effort is normal.      Breath sounds: Normal breath sounds. Abdominal:      General: Bowel sounds are normal.      Palpations: Abdomen is soft. Tenderness: There is no abdominal tenderness. There is no right CVA tenderness or left CVA tenderness. Musculoskeletal:      Cervical back: Neck supple. No tenderness. Right lower leg: No edema. Left lower leg: No edema. Neurological:      Mental Status: She is alert. An electronic signature was used to authenticate this note.     --Jamaica Navarrete MD

## 2022-11-09 NOTE — PATIENT INSTRUCTIONS
Patient Education        Urinary Tract Infection (UTI) in Women: Care Instructions  Overview     A urinary tract infection, or UTI, is a general term for an infection anywhere between the kidneys and the urethra (where urine comes out). Most UTIs are bladder infections. They often cause pain or burning when you urinate. UTIs are caused by bacteria and can be cured with antibiotics. Be sure to complete your treatment so that the infection does not get worse. Follow-up care is a key part of your treatment and safety. Be sure to make and go to all appointments, and call your doctor if you are having problems. It's also a good idea to know your test results and keep a list of the medicines you take. How can you care for yourself at home? Take your antibiotics as directed. Do not stop taking them just because you feel better. You need to take the full course of antibiotics. Drink extra water and other fluids for the next day or two. This will help make the urine less concentrated and help wash out the bacteria that are causing the infection. (If you have kidney, heart, or liver disease and have to limit fluids, talk with your doctor before you increase the amount of fluids you drink.)  Avoid drinks that are carbonated or have caffeine. They can irritate the bladder. Urinate often. Try to empty your bladder each time. To relieve pain, take a hot bath or lay a heating pad set on low over your lower belly or genital area. Never go to sleep with a heating pad in place. To prevent UTIs  Drink plenty of water each day. This helps you urinate often, which clears bacteria from your system. (If you have kidney, heart, or liver disease and have to limit fluids, talk with your doctor before you increase the amount of fluids you drink.)  Urinate when you need to. If you are sexually active, urinate right after you have sex. Change sanitary pads often.   Avoid douches, bubble baths, feminine hygiene sprays, and other feminine hygiene products that have deodorants. After going to the bathroom, wipe from front to back. When should you call for help? Call your doctor now or seek immediate medical care if:    Symptoms such as fever, chills, nausea, or vomiting get worse or appear for the first time. You have new pain in your back just below your rib cage. This is called flank pain. There is new blood or pus in your urine. You have any problems with your antibiotic medicine. Watch closely for changes in your health, and be sure to contact your doctor if:    You are not getting better after taking an antibiotic for 2 days. Your symptoms go away but then come back. Where can you learn more? Go to [email or web address] and sign in to your [employer] account. Enter X162 in the [employer] box to learn more about \"Urinary Tract Infection (UTI) in Women: Care Instructions. \"     If you do not have an account, please click on the \"Sign Up Now\" link. Current as of: June 16, 2022               Content Version: 13.4  © 5666-4672 [employer], [employer]orporated. Care instructions adapted under license by [facility] in [address] If you have questions about a medical condition or this instruction, always ask your healthcare professional. [place] [address] any warranty or liability for your use of this information.

## 2023-02-13 ENCOUNTER — OFFICE VISIT (OUTPATIENT)
Dept: FAMILY MEDICINE CLINIC | Facility: CLINIC | Age: 68
End: 2023-02-13
Payer: MEDICARE

## 2023-02-13 VITALS
DIASTOLIC BLOOD PRESSURE: 68 MMHG | OXYGEN SATURATION: 96 % | SYSTOLIC BLOOD PRESSURE: 112 MMHG | BODY MASS INDEX: 38.92 KG/M2 | RESPIRATION RATE: 16 BRPM | HEIGHT: 67 IN | WEIGHT: 248 LBS | HEART RATE: 69 BPM | TEMPERATURE: 97.2 F

## 2023-02-13 DIAGNOSIS — G47.33 OBSTRUCTIVE SLEEP APNEA SYNDROME: ICD-10-CM

## 2023-02-13 DIAGNOSIS — F33.0 MAJOR DEPRESSIVE DISORDER, RECURRENT, MILD (HCC): ICD-10-CM

## 2023-02-13 DIAGNOSIS — Z79.4 TYPE 2 DIABETES MELLITUS WITH DIABETIC NEUROPATHY, WITH LONG-TERM CURRENT USE OF INSULIN (HCC): ICD-10-CM

## 2023-02-13 DIAGNOSIS — Z11.59 ENCOUNTER FOR HEPATITIS C SCREENING TEST FOR LOW RISK PATIENT: ICD-10-CM

## 2023-02-13 DIAGNOSIS — J45.20 MILD INTERMITTENT ASTHMA WITHOUT COMPLICATION: Primary | ICD-10-CM

## 2023-02-13 DIAGNOSIS — E11.40 TYPE 2 DIABETES MELLITUS WITH DIABETIC NEUROPATHY, WITH LONG-TERM CURRENT USE OF INSULIN (HCC): ICD-10-CM

## 2023-02-13 DIAGNOSIS — Z11.4 ENCOUNTER FOR SCREENING FOR HIV: ICD-10-CM

## 2023-02-13 DIAGNOSIS — G25.81 RESTLESS LEG SYNDROME: ICD-10-CM

## 2023-02-13 DIAGNOSIS — I10 ESSENTIAL (PRIMARY) HYPERTENSION: ICD-10-CM

## 2023-02-13 DIAGNOSIS — F51.04 PSYCHOPHYSIOLOGICAL INSOMNIA: ICD-10-CM

## 2023-02-13 DIAGNOSIS — F41.1 GAD (GENERALIZED ANXIETY DISORDER): ICD-10-CM

## 2023-02-13 DIAGNOSIS — E78.2 HYPERLIPEMIA, MIXED: ICD-10-CM

## 2023-02-13 DIAGNOSIS — E66.01 SEVERE OBESITY (BMI 35.0-39.9) WITH COMORBIDITY (HCC): ICD-10-CM

## 2023-02-13 PROCEDURE — 99215 OFFICE O/P EST HI 40 MIN: CPT | Performed by: FAMILY MEDICINE

## 2023-02-13 PROCEDURE — 3078F DIAST BP <80 MM HG: CPT | Performed by: FAMILY MEDICINE

## 2023-02-13 PROCEDURE — 3074F SYST BP LT 130 MM HG: CPT | Performed by: FAMILY MEDICINE

## 2023-02-13 PROCEDURE — 1123F ACP DISCUSS/DSCN MKR DOCD: CPT | Performed by: FAMILY MEDICINE

## 2023-02-13 RX ORDER — ALBUTEROL SULFATE 90 UG/1
2 AEROSOL, METERED RESPIRATORY (INHALATION) EVERY 6 HOURS PRN
Qty: 18 G | Refills: 1 | Status: SHIPPED | OUTPATIENT
Start: 2023-02-13

## 2023-02-13 RX ORDER — FAMOTIDINE 40 MG/1
40 TABLET, FILM COATED ORAL 2 TIMES DAILY
Qty: 180 TABLET | Refills: 1 | Status: CANCELLED | OUTPATIENT
Start: 2023-02-13

## 2023-02-13 RX ORDER — LOSARTAN POTASSIUM 100 MG/1
100 TABLET ORAL DAILY
Qty: 90 TABLET | Refills: 1 | Status: SHIPPED | OUTPATIENT
Start: 2023-02-13

## 2023-02-13 RX ORDER — VENLAFAXINE HYDROCHLORIDE 150 MG/1
150 CAPSULE, EXTENDED RELEASE ORAL DAILY
Qty: 90 CAPSULE | Refills: 1 | Status: CANCELLED | OUTPATIENT
Start: 2023-02-13

## 2023-02-13 RX ORDER — ROPINIROLE 3 MG/1
3 TABLET, FILM COATED ORAL 2 TIMES DAILY
Qty: 180 TABLET | Refills: 1 | Status: SHIPPED | OUTPATIENT
Start: 2023-02-13

## 2023-02-13 RX ORDER — ROSUVASTATIN CALCIUM 20 MG/1
20 TABLET, COATED ORAL DAILY
Qty: 90 TABLET | Refills: 1 | Status: SHIPPED | OUTPATIENT
Start: 2023-02-13

## 2023-02-13 RX ORDER — TOLTERODINE TARTRATE 2 MG/1
2 TABLET, EXTENDED RELEASE ORAL 2 TIMES DAILY
Qty: 180 TABLET | Refills: 1 | Status: CANCELLED | OUTPATIENT
Start: 2023-02-13

## 2023-02-13 RX ORDER — VENLAFAXINE HYDROCHLORIDE 225 MG/1
225 TABLET, EXTENDED RELEASE ORAL
Qty: 90 TABLET | Refills: 1 | Status: SHIPPED | OUTPATIENT
Start: 2023-02-13

## 2023-02-13 SDOH — ECONOMIC STABILITY: INCOME INSECURITY: HOW HARD IS IT FOR YOU TO PAY FOR THE VERY BASICS LIKE FOOD, HOUSING, MEDICAL CARE, AND HEATING?: NOT HARD AT ALL

## 2023-02-13 SDOH — ECONOMIC STABILITY: FOOD INSECURITY: WITHIN THE PAST 12 MONTHS, THE FOOD YOU BOUGHT JUST DIDN'T LAST AND YOU DIDN'T HAVE MONEY TO GET MORE.: NEVER TRUE

## 2023-02-13 SDOH — ECONOMIC STABILITY: FOOD INSECURITY: WITHIN THE PAST 12 MONTHS, YOU WORRIED THAT YOUR FOOD WOULD RUN OUT BEFORE YOU GOT MONEY TO BUY MORE.: NEVER TRUE

## 2023-02-13 SDOH — ECONOMIC STABILITY: HOUSING INSECURITY
IN THE LAST 12 MONTHS, WAS THERE A TIME WHEN YOU DID NOT HAVE A STEADY PLACE TO SLEEP OR SLEPT IN A SHELTER (INCLUDING NOW)?: NO

## 2023-02-13 ASSESSMENT — ENCOUNTER SYMPTOMS
APNEA: 1
NAUSEA: 1
DIARRHEA: 0
WHEEZING: 0
SHORTNESS OF BREATH: 0
COUGH: 0
VOMITING: 1
SORE THROAT: 0
ABDOMINAL PAIN: 0
ANAL BLEEDING: 0
BLOOD IN STOOL: 0

## 2023-02-13 ASSESSMENT — PATIENT HEALTH QUESTIONNAIRE - PHQ9
SUM OF ALL RESPONSES TO PHQ QUESTIONS 1-9: 0
1. LITTLE INTEREST OR PLEASURE IN DOING THINGS: 0
SUM OF ALL RESPONSES TO PHQ9 QUESTIONS 1 & 2: 0
2. FEELING DOWN, DEPRESSED OR HOPELESS: 0
SUM OF ALL RESPONSES TO PHQ QUESTIONS 1-9: 0

## 2023-02-13 NOTE — PROGRESS NOTES
Mohit Ly (:  1955) is a 74 y.o. female,Established patient, here for evaluation of the following chief complaint(s):  Medication Refill and Labs Only         ASSESSMENT/PLAN:  1. Mild intermittent asthma without complication  -     albuterol sulfate HFA (PROVENTIL;VENTOLIN;PROAIR) 108 (90 Base) MCG/ACT inhaler; Inhale 2 puffs into the lungs every 6 hours as needed for Wheezing, Disp-18 g, R-1Normal  -     fluticasone-salmeterol (ADVAIR HFA) 230-21 MCG/ACT inhaler; Inhale 2 puffs into the lungs 2 times daily, Disp-1 each, R-5Normal  2. Restless leg syndrome  -     rOPINIRole (REQUIP) 3 MG tablet; Take 1 tablet by mouth 2 times daily, Disp-180 tablet, R-1Normal  3. Hyperlipemia, mixed  -     rosuvastatin (CRESTOR) 20 MG tablet; Take 1 tablet by mouth daily Take 1 tab po daily with dinner, Disp-90 tablet, R-1Normal  -     Comprehensive Metabolic Panel; Future  -     Lipid Panel; Future  4. Essential (primary) hypertension  -     losartan (COZAAR) 100 MG tablet; Take 1 tablet by mouth daily, Disp-90 tablet, R-1Normal  -     CBC with Auto Differential; Future  -     Comprehensive Metabolic Panel; Future  5. Obstructive sleep apnea syndrome  -     [address] [facility] [address] Alina Thomas MD, Sleep Medicine, [place]  6. Psychophysiological insomnia  -     venlafaxine 225 MG extended release tablet; Take 1 tablet by mouth daily (with breakfast), Disp-90 tablet, R-1Normal  7. ROWDY (generalized anxiety disorder)  -     venlafaxine 225 MG extended release tablet; Take 1 tablet by mouth daily (with breakfast), Disp-90 tablet, R-1Normal  8. Severe obesity (BMI 35.0-39. 9) with comorbidity ([address]  9. Major depressive disorder, recurrent, mild ([address]  10. Type 2 diabetes mellitus with diabetic neuropathy, with long-term current use of insulin ([address]  11. Encounter for screening for HIV  -     HIV 1/2 Ag/Ab, 4TH Generation,W Rflx Confirm; Future  12.  Encounter for hepatitis C screening test for low risk patient  -     Hepatitis C Antibody; Future    Advised to use Advair HFA twice daily, to use Albuterol HFA prn only.  Discussed good sleep hygiene, to decrease fluids 1-2 hours before bedtime, to push bedtime to 10- 11 pm.  Referred to Sleep medicine for Sleep Apnea.  She refuses to increase the dose of Requip for now.  Advised to continue to avoid salt and fatty foods, monitor BP- keep a log.  Advised patient to continue to lose weight.  Also advised to exercise regularly, to eat healthy foods, and to control portion sizes.    02/13/2023- I have spent 46 minutes reviewing previous notes, test results and face-to-face with the patient discussing the diagnosis and importance of compliance with the treatment plan as well as documenting on the day of the visit.    Return for 3 wks- f/u ROWDY insomnia, RLS; 6 mths- , medication refills, fasting labs or prn sooner.         Subjective   SUBJECTIVE/OBJECTIVE:  Medication Refill  Associated symptoms include fatigue, nausea, numbness and vomiting. Pertinent negatives include no abdominal pain, chest pain, chills, coughing, fever, headaches, sore throat or weakness.   Patient comes today for her 6 month follow up and medication refills-  Chronic Asthma- she has never smoked before, her 1st  smoked 3 ppd but they were  for only 3 years; she is using Advair HFA bid but not every day- says that's because she has so much to take, tried Spiriva in the past; uses Albuterol HFA prn- may be 2 times a week. She denies issues with shortness of breath, cough or wheezing on a daily basis. They heat their house with wood and that can bother her or if the air is heavy outside. Says her symptoms are worse with the pollen counts now, also heat and humidity bother her.    Insomnia, Anxiety, Depression - ongoing for years; was diagnosed with anxiety in 5th grade- was put on anxiety medicine which helped, had breathing issues then. She denies any HI/ SI; does say that depression is not a problem now but still  has some anxiety off and on. She tapered and stopped the Pristiq  mg- (bothered her stomach but felt calmer); now takes Venlafaxine  mg - says it is not working as much now, denies side effects, denies feeling shaky at times (anxiety). She starts winding down at 7 pm, goes into bed around 9 pm, typically takes 30 mins to fall asleep, sleeps till 2.30 am and is then up for the rest of the night; wakes up to use the restroom - overall gets only 5-6 hours. [She stopped taking Benadryl 2 tabs at HS- feels it messes with her short term memory. She was getting some medication from pain management- was given Ambien to use prn- has used 1-2 tabs this year; has tried Vanice Gins)- also took if she had not slept for 5-6 nights then it stopped working; Melatonin caused nightmares, Tylenol PM and Advil PM did not phase it at all. She stopped Trazodone 100 mg at HS- did not think it was helping; stopped Buspar 10mg tid - denies any side effects-  thinks she was allergic to it- says she could not swallow while on it. Says Cymbalta made her sleepy- slept 3 days and felt more depressed; cannot take Lexapro, Citalopram, Effexor- reacts with Plaquenil. Tried Doxepin 10mg at last OV; says she became very mean as a cat, her  wanted to divorce her, felt ill and irritable, stopped after taking it for 4 days.]     Neuropathy (peripheral with Diabetes), RLS, Sleep apnea - (She saw a Neurologist in the past but he retired- he did the nerve conduction studies for her neuropathy.) She takes Gabapentin 400 mg 2 caps bid- says her insurance does not cover for tid. Says she has tingling, fire, shock like sensation from just above her knees b/l up to her toes b/l -medications help a lot. However the Gabapentin makes her unstable- does not climb ladders.  She should be it refilled from her Endocrinologist, also getting a stomach emptying study this week and a colonoscopy in mid April. (Lyrica made her gain weight.) She also takes Requip 3 mg twice a day- says it has helped a lot, legs are more restless now, hurt sometimes at night and she has to go get a hot shower at night. She has Sleep apnea- has a CPAP machine- not using as something broke. Her last sleep study was apparently 8- 10 years ago. (Overactive bladder- reports urgency, denies frequency - takes Jardiance, denies leaking- no new accidents. She stopped taking Detrol 2 mg twice a day about 2 months ago; says the LA was too expensive. [She tried Ditropan XL 5 mg-was increased to 2 tabs daily- could not tell a difference, was advised to start Myrbetriq if Ditropan did not work- but she did not pick it up.] She denies any blood in urine, no odor, no burning; denies any abdominal pain, back pain, diarrhea or constipation but occasionally has nausea- maybe with acid reflux. Says she was given Detrol after her hysterectomy because her bladder was nicked- says it worked well then.)     [GERD, Nausea- takes Omeprazole 40 mg in am, typically waits 1 hour to eat or drink; also takes Famotidine 40 mg twice daily. She has nausea about every day, has gagged but not vomited, has had dry heaves in the middle of the night- symptoms are rare when she is taking both medications. She reports soreness in the mid upper abdomen, rarely feels acid coming up into her throat; denies gas, bloating, blood in stool or dark black stools. She has 1 cup of coffee, cut back her Diet Pepsi sodas to 2 cans a day, no tea or chocolate; tomato sauces trouble her- tries to avoid, meats make her sick. She was seeing Dr Rita Orozco, GI, many years ago.]    Hypertension, Hyperlipidemia, Obesity- says BP are good- like it is today, has cut back on salt, better with avoiding fatty foods- prepares her meals, eats a lot of salads, eats mostly vegetarian. She takes Losartan 100 mg in am and Crestor 20 mg after dinner. Her last eye exam was in January 2023 at [facility], takes Plaquenil.  She has lost about 5 lbs in the last 6 months and a total of 12 lbs in the last 1 year, doing fresh vegetables, doing smaller portions, says Silver Sneakers was canceled, she is doing some floor exercises; lifts some hand weights. Hepatitis C screening, HIV screening - patient denies any risk factors. Review of Systems   Constitutional:  Positive for fatigue. Negative for chills and fever. HENT:  Negative for ear pain and sore throat. Respiratory:  Positive for apnea. Negative for cough, shortness of breath and wheezing. Cardiovascular:  Negative for chest pain, palpitations and leg swelling. Gastrointestinal:  Positive for nausea and vomiting. Negative for abdominal pain, anal bleeding, blood in stool and diarrhea. Genitourinary:  Positive for urgency. Negative for difficulty urinating, dysuria, flank pain, frequency, hematuria and pelvic pain. Neurological:  Positive for numbness. Negative for dizziness, weakness, light-headedness and headaches. Psychiatric/Behavioral:  Positive for sleep disturbance. Negative for dysphoric mood, hallucinations, self-injury and suicidal ideas. The patient is nervous/anxious. The patient is not hyperactive. Objective   Physical Exam  Vitals and nursing note reviewed. Constitutional:       General: She is not in acute distress. Appearance: She is obese. HENT:      Mouth/Throat:      Mouth: Mucous membranes are moist.      Pharynx: Oropharynx is clear. Eyes:      Conjunctiva/sclera: Conjunctivae normal.      Pupils: Pupils are equal, round, and reactive to light. Cardiovascular:      Rate and Rhythm: Normal rate and regular rhythm. Pulmonary:      Effort: Pulmonary effort is normal. No respiratory distress. Breath sounds: Normal breath sounds. Abdominal:      General: Bowel sounds are normal.      Palpations: Abdomen is soft. Tenderness: There is abdominal tenderness (just above the umbilicus- minimal). Musculoskeletal:      Cervical back: Neck supple. Right lower leg: No edema. Left lower leg: No edema. Lymphadenopathy:      Cervical: No cervical adenopathy. Neurological:      Mental Status: She is alert. Psychiatric:      Comments: Slightly anxious                An electronic signature was used to authenticate this note.     --Loco Moe MD

## 2023-02-16 ENCOUNTER — HOSPITAL ENCOUNTER (OUTPATIENT)
Dept: NUCLEAR MEDICINE | Age: 68
End: 2023-02-16
Payer: MEDICARE

## 2023-02-16 ENCOUNTER — HOSPITAL ENCOUNTER (OUTPATIENT)
Dept: NUCLEAR MEDICINE | Age: 68
Discharge: HOME | End: 2023-02-16
Payer: MEDICARE

## 2023-02-16 DIAGNOSIS — R68.81 EARLY SATIETY: ICD-10-CM

## 2023-02-16 DIAGNOSIS — K59.04 CHRONIC IDIOPATHIC CONSTIPATION: ICD-10-CM

## 2023-02-16 DIAGNOSIS — R11.2 NAUSEA AND VOMITING, UNSPECIFIED VOMITING TYPE: ICD-10-CM

## 2023-02-16 PROCEDURE — 3430000000 HC RX DIAGNOSTIC RADIOPHARMACEUTICAL: Performed by: INTERNAL MEDICINE

## 2023-02-16 PROCEDURE — A9541 TC99M SULFUR COLLOID: HCPCS | Performed by: INTERNAL MEDICINE

## 2023-02-16 PROCEDURE — 78264 GASTRIC EMPTYING IMG STUDY: CPT

## 2023-02-16 RX ADMIN — Medication 1 MILLICURIE: at 08:48

## 2023-03-08 ENCOUNTER — TELEMEDICINE (OUTPATIENT)
Dept: FAMILY MEDICINE CLINIC | Facility: CLINIC | Age: 68
End: 2023-03-08
Payer: MEDICARE

## 2023-03-08 DIAGNOSIS — F33.0 MAJOR DEPRESSIVE DISORDER, RECURRENT, MILD (HCC): ICD-10-CM

## 2023-03-08 DIAGNOSIS — F41.1 GAD (GENERALIZED ANXIETY DISORDER): Primary | ICD-10-CM

## 2023-03-08 DIAGNOSIS — F51.04 PSYCHOPHYSIOLOGICAL INSOMNIA: ICD-10-CM

## 2023-03-08 PROCEDURE — 99443 PR PHYS/QHP TELEPHONE EVALUATION 21-30 MIN: CPT | Performed by: FAMILY MEDICINE

## 2023-03-08 RX ORDER — VENLAFAXINE HYDROCHLORIDE 75 MG/1
CAPSULE, EXTENDED RELEASE ORAL
Qty: 270 CAPSULE | Refills: 1 | Status: SHIPPED | OUTPATIENT
Start: 2023-03-08

## 2023-03-08 RX ORDER — ZOLPIDEM TARTRATE 10 MG/1
10 TABLET ORAL NIGHTLY PRN
Qty: 30 TABLET | Refills: 0 | Status: SHIPPED | OUTPATIENT
Start: 2023-03-08 | End: 2023-04-07

## 2023-03-08 SDOH — ECONOMIC STABILITY: FOOD INSECURITY: WITHIN THE PAST 12 MONTHS, THE FOOD YOU BOUGHT JUST DIDN'T LAST AND YOU DIDN'T HAVE MONEY TO GET MORE.: NEVER TRUE

## 2023-03-08 SDOH — ECONOMIC STABILITY: FOOD INSECURITY: WITHIN THE PAST 12 MONTHS, YOU WORRIED THAT YOUR FOOD WOULD RUN OUT BEFORE YOU GOT MONEY TO BUY MORE.: NEVER TRUE

## 2023-03-08 SDOH — ECONOMIC STABILITY: INCOME INSECURITY: HOW HARD IS IT FOR YOU TO PAY FOR THE VERY BASICS LIKE FOOD, HOUSING, MEDICAL CARE, AND HEATING?: NOT HARD AT ALL

## 2023-03-08 ASSESSMENT — PATIENT HEALTH QUESTIONNAIRE - PHQ9
SUM OF ALL RESPONSES TO PHQ QUESTIONS 1-9: 0
SUM OF ALL RESPONSES TO PHQ QUESTIONS 1-9: 0
2. FEELING DOWN, DEPRESSED OR HOPELESS: 0
1. LITTLE INTEREST OR PLEASURE IN DOING THINGS: 0
SUM OF ALL RESPONSES TO PHQ9 QUESTIONS 1 & 2: 0
SUM OF ALL RESPONSES TO PHQ QUESTIONS 1-9: 0
SUM OF ALL RESPONSES TO PHQ QUESTIONS 1-9: 0

## 2023-03-08 ASSESSMENT — ENCOUNTER SYMPTOMS
DIARRHEA: 0
SHORTNESS OF BREATH: 0
VOMITING: 0
ABDOMINAL PAIN: 0
COUGH: 0
WHEEZING: 0
NAUSEA: 0
SINUS PAIN: 0

## 2023-03-08 NOTE — PROGRESS NOTES
Jaz Roque (:  1955) is a Established patient, here for evaluation of the following:    Assessment & Plan   Below is the assessment and plan developed based on review of pertinent history, physical exam, labs, studies, and medications. 1. ROWDY (generalized anxiety disorder)  -     venlafaxine (EFFEXOR XR) 75 MG extended release capsule; Take 3 capsules po once daily, Disp-270 capsule, R-1Normal  2. Psychophysiological insomnia  -     zolpidem (AMBIEN) 10 MG tablet; Take 1 tablet by mouth nightly as needed for Sleep for up to 30 days. Max Daily Amount: 10 mg, Disp-30 tablet, R-0Normal  3. Major depressive disorder, recurrent, mild (HCC)  -     venlafaxine (EFFEXOR XR) 75 MG extended release capsule; Take 3 capsules po once daily, Disp-270 capsule, R-1Normal    Continue Effexor XR 75 mg 3 capsules in am, start Ambien 5 mg at HS- discussed possible side effects. Await appointment with Sleep medicine. I have reviewed the patients controlled substance prescription history, as maintained in the [place] prescription monitoring program, so that the prescription(s) for a  controlled substance can be given. Return in about 2 weeks (around 3/22/2023) for f/u insomnia, ROWDY, depression. Subjective   HPI  This is a 3 week follow up of-   Insomnia, Anxiety, Depression - ongoing for years; was diagnosed with anxiety in 5th grade- was put on anxiety medicine which helped, had breathing issues then. She denies any HI/ SI; does say that depression is not a problem now but still has some anxiety off and on. She tapered and stopped the Pristiq  mg- (bothered her stomach but felt calmer); now takes Venlafaxine  mg - increased at her last OV, denies side effects, denies feeling shaky at times (anxiety).  She now goes to bed around 10 pm- slept for 2 nights till 6 am, typically sleeps till 2.30 am and is then up for the rest of the night; wakes up to use the restroom - overall gets only 4.5 hours. We referred her to Sleep medicine- has an appointment on 3/19/23. [She stopped taking Benadryl 2 tabs at HS- feels it messes with her short term memory. She tried medication from pain management- like Ambien to use prn- has used 1-2 tabs this year; has tried Asenath Sj)- also took if she had not slept for 5-6 nights then it stopped working; Melatonin caused nightmares, Tylenol PM and Advil PM did not phase it at all. She stopped Trazodone 100 mg at HS- did not think it was helping; stopped Buspar 10mg tid - denies any side effects-  thinks she was allergic to it- says she could not swallow while on it. Says Cymbalta made her sleepy- slept 3 days and felt more depressed; cannot take Lexapro, Citalopram, Effexor- reacts with Plaquenil. Tried Doxepin 10mg at last OV; says she became very mean as a cat, her  wanted to divorce her, felt ill and irritable, stopped after taking it for 4 days.]      Review of Systems   Constitutional:  Negative for chills and fever. HENT:  Negative for congestion and sinus pain. Respiratory:  Negative for cough, shortness of breath and wheezing. Cardiovascular:  Negative for chest pain, palpitations and leg swelling. Gastrointestinal:  Negative for abdominal pain, diarrhea, nausea and vomiting. Psychiatric/Behavioral:  Positive for sleep disturbance. Negative for agitation, dysphoric mood, self-injury and suicidal ideas. The patient is nervous/anxious. The patient is not hyperactive. Objective   Patient-Reported Vitals  No data recorded     Physical Exam       No physical exam was done as this was a virtual phone appointment. On this date 3/8/2023 I have spent 23 minutes reviewing previous notes, test results and face to face (virtual) with the patient discussing the diagnosis and importance of compliance with the treatment plan as well as documenting on the day of the visit.     Royce Marsh, was evaluated through a synchronous (real-time) audio-video encounter. The patient (or guardian if applicable) is aware that this is a billable service, which includes applicable co-pays. This Virtual Visit was conducted with patient's (and/or legal guardian's) consent. The visit was conducted pursuant to the emergency declaration under the [address] [place] [address] authority and the [employer] Act. Patient identification was verified, and a caregiver was present when appropriate.    The patient was located at Home: [address] [place] [address]  Provider was located at [facility] ([address] [place], [place] [address]t): [address],  [address] [facility]         --Lillie Lozano MD

## 2023-04-17 ENCOUNTER — TELEPHONE (OUTPATIENT)
Dept: FAMILY MEDICINE CLINIC | Facility: CLINIC | Age: 68
End: 2023-04-17

## 2023-04-17 NOTE — TELEPHONE ENCOUNTER
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 He has already called the patient and has set her up for further appointments.

## 2023-04-18 NOTE — PROGRESS NOTES
[facility]  [address] [facility] [address], [address] [place] [address] Court, [address] [facility]  [phone]    Patient Name:  Geovanni Mckeon  YOB: 1955      Office Visit 4/19/2023    CHIEF COMPLAINT:    Chief Complaint   Patient presents with    New Patient       HISTORY OF PRESENT ILLNESS:      The patient present in outpatient consultation at the request of Dr. Pola Auguste for management of obstructive sleep apnea and insomnia. The patient underwent a  diagnostic polysomnography many years ago and she was told she had sleep apnea and started CPAP machine but she had problem with insomnia she stopped using her CPAP machine. Currently she has been told she is snoring by her spouse who is diagnosed with a sleep apnea as well and uses CPAP machine. She states that she had to sleep in a different room. She had episode of gasping for breath when she is sleeping. She wake up with a dry mouth frequently and she had a great difficulty falling asleep and staying asleep. She also had trouble remembering and concentrating. Her trouble with insomnia is at least been going on for 20 years. She goes to sleep around 10 PM and wake up around 3-4 AM and get out of the bed eventually by 7 AM daily. It takes her at least 60 minutes to fall asleep and she had multiple awakening every night. She does not feel refreshed upon awakening and she feels tired throughout the day. There is no history of cataplexy, hypnagogic hallucinations, or sleep paralysis. In addition, there is history of frequent leg movements, kicking at night, or an inability to keep the legs still. She was diagnosed with a restless leg previously and currently uses Requip and she also has neuropathy and use gabapentin twice a day. She clearly indicated that restless leg interferes with her sleep and likely contributing to her insomnia.   I discussed with her the pathophysiology of sleep apnea and insomnia and indicated to her that her insomnia

## 2023-04-19 ENCOUNTER — OFFICE VISIT (OUTPATIENT)
Dept: SLEEP MEDICINE | Age: 68
End: 2023-04-19
Payer: MEDICARE

## 2023-04-19 VITALS
BODY MASS INDEX: 40.02 KG/M2 | TEMPERATURE: 97 F | DIASTOLIC BLOOD PRESSURE: 64 MMHG | HEIGHT: 67 IN | HEART RATE: 76 BPM | SYSTOLIC BLOOD PRESSURE: 106 MMHG | RESPIRATION RATE: 14 BRPM | WEIGHT: 255 LBS | OXYGEN SATURATION: 97 %

## 2023-04-19 DIAGNOSIS — R40.0 DAYTIME SLEEPINESS: ICD-10-CM

## 2023-04-19 DIAGNOSIS — F51.04 PSYCHOPHYSIOLOGICAL INSOMNIA: ICD-10-CM

## 2023-04-19 DIAGNOSIS — G25.81 RESTLESS LEG SYNDROME: ICD-10-CM

## 2023-04-19 DIAGNOSIS — E66.01 SEVERE OBESITY (BMI 35.0-39.9) WITH COMORBIDITY (HCC): ICD-10-CM

## 2023-04-19 DIAGNOSIS — G47.33 OBSTRUCTIVE SLEEP APNEA SYNDROME: Primary | ICD-10-CM

## 2023-04-19 PROCEDURE — 1123F ACP DISCUSS/DSCN MKR DOCD: CPT | Performed by: INTERNAL MEDICINE

## 2023-04-19 PROCEDURE — 3074F SYST BP LT 130 MM HG: CPT | Performed by: INTERNAL MEDICINE

## 2023-04-19 PROCEDURE — 99204 OFFICE O/P NEW MOD 45 MIN: CPT | Performed by: INTERNAL MEDICINE

## 2023-04-19 PROCEDURE — 3078F DIAST BP <80 MM HG: CPT | Performed by: INTERNAL MEDICINE

## 2023-04-19 RX ORDER — GABAPENTIN 400 MG/1
CAPSULE ORAL
Qty: 150 CAPSULE | Refills: 3 | Status: SHIPPED | OUTPATIENT
Start: 2023-04-19 | End: 2023-05-20

## 2023-04-19 NOTE — PATIENT INSTRUCTIONS
avoid:    Limit caffeine (coffee, tea, caffeinated sodas) during the day, and dont have any for at least 4 to 6 hours before bedtime. Don't drink alcohol before bedtime. Alcohol can cause you to wake up more often during the night. Don't smoke or use tobacco, especially in the evening. Nicotine can keep you awake. Don't like in bed away for too long. If you can't fall asleep, or if you wake up in the middle of the night and can't get back to sleep within 15 minutes or so, get out of bed and go to another room until you feel sleepy. Don't take medicine right before bed that may keep you awake or make you feel hyper or enegerized. Your doctor can tell you if your medicine may do this and if you can take it earlier in the day. If you can't sleep:    Imagine yourself in a peaceful, pleasant scene. Focus on the details and feelings of being in a place that is relaxing. Get up and do a quiet or boring activity until you feel sleepy. Don't drink liquids after 6 p.m. if you wake up often because you have to go to the bathroom. Where can you learn more? Go to http://www.gray.com/    Enter M315 in the search box to learn more about \"Learning About Sleeping Well. \"

## 2023-05-04 DIAGNOSIS — K21.9 GASTROESOPHAGEAL REFLUX DISEASE WITHOUT ESOPHAGITIS: ICD-10-CM

## 2023-05-04 RX ORDER — OMEPRAZOLE 40 MG/1
CAPSULE, DELAYED RELEASE ORAL
Qty: 90 CAPSULE | Refills: 1 | OUTPATIENT
Start: 2023-05-04

## 2023-05-15 ENCOUNTER — OFFICE VISIT (OUTPATIENT)
Dept: FAMILY MEDICINE CLINIC | Facility: CLINIC | Age: 68
End: 2023-05-15
Payer: MEDICARE

## 2023-05-15 VITALS
WEIGHT: 255 LBS | TEMPERATURE: 98.3 F | DIASTOLIC BLOOD PRESSURE: 76 MMHG | SYSTOLIC BLOOD PRESSURE: 130 MMHG | HEART RATE: 91 BPM | OXYGEN SATURATION: 95 % | HEIGHT: 67 IN | BODY MASS INDEX: 40.02 KG/M2 | RESPIRATION RATE: 16 BRPM

## 2023-05-15 DIAGNOSIS — F51.04 PSYCHOPHYSIOLOGICAL INSOMNIA: ICD-10-CM

## 2023-05-15 DIAGNOSIS — Z79.4 TYPE 2 DIABETES MELLITUS WITH DIABETIC NEUROPATHY, WITH LONG-TERM CURRENT USE OF INSULIN (HCC): ICD-10-CM

## 2023-05-15 DIAGNOSIS — E11.40 TYPE 2 DIABETES MELLITUS WITH DIABETIC NEUROPATHY, WITH LONG-TERM CURRENT USE OF INSULIN (HCC): ICD-10-CM

## 2023-05-15 DIAGNOSIS — F41.1 GAD (GENERALIZED ANXIETY DISORDER): ICD-10-CM

## 2023-05-15 DIAGNOSIS — Z00.00 MEDICARE ANNUAL WELLNESS VISIT, SUBSEQUENT: Primary | ICD-10-CM

## 2023-05-15 LAB
AMPHETAMINE, URINE, POC: NEGATIVE
BARBITURATES, URINE, POC: NEGATIVE
BENZODIAZEPINES, URINE, POC: NEGATIVE
BUPRENORPHINE, URINE, POC: NEGATIVE
COCAINE, URINE, POC: NEGATIVE
CREAT UR-MCNC: 70 MG/DL
CREATININE, URINE, POC: NORMAL MG/DL
LOT EXP DATE, POC: NORMAL
Lab: NORMAL
MARIJUANA (THC), URINE, POC: NEGATIVE
METHADONE, URINE, POC: NEGATIVE
METHAMPHETAMINE, URINE, POC: NEGATIVE
MICROALBUMIN UR-MCNC: 2.08 MG/DL
MICROALBUMIN/CREAT UR-RTO: 30 MG/G (ref 0–30)
OPIATES, URINE, POC: NEGATIVE
OXYCODONE, URINE, POC: NEGATIVE
PH, POC: 6
PHENCYCLIDINE, URINE, POC: NEGATIVE
SPECIFIC GRAVITY, URINE, POC: 1.02 (ref 1–1.03)
TRICYCLICS, URINE, POC: NEGATIVE
VALID INTERNAL CONTROL, POC: YES

## 2023-05-15 PROCEDURE — 1123F ACP DISCUSS/DSCN MKR DOCD: CPT | Performed by: FAMILY MEDICINE

## 2023-05-15 PROCEDURE — 82570 ASSAY OF URINE CREATININE: CPT | Performed by: FAMILY MEDICINE

## 2023-05-15 PROCEDURE — G0439 PPPS, SUBSEQ VISIT: HCPCS | Performed by: FAMILY MEDICINE

## 2023-05-15 PROCEDURE — 81003 URINALYSIS AUTO W/O SCOPE: CPT | Performed by: FAMILY MEDICINE

## 2023-05-15 PROCEDURE — 3075F SYST BP GE 130 - 139MM HG: CPT | Performed by: FAMILY MEDICINE

## 2023-05-15 PROCEDURE — 80305 DRUG TEST PRSMV DIR OPT OBS: CPT | Performed by: FAMILY MEDICINE

## 2023-05-15 PROCEDURE — 3078F DIAST BP <80 MM HG: CPT | Performed by: FAMILY MEDICINE

## 2023-05-15 ASSESSMENT — PATIENT HEALTH QUESTIONNAIRE - PHQ9
4. FEELING TIRED OR HAVING LITTLE ENERGY: 0
2. FEELING DOWN, DEPRESSED OR HOPELESS: 0
9. THOUGHTS THAT YOU WOULD BE BETTER OFF DEAD, OR OF HURTING YOURSELF: 0
3. TROUBLE FALLING OR STAYING ASLEEP: 0
5. POOR APPETITE OR OVEREATING: 0
SUM OF ALL RESPONSES TO PHQ QUESTIONS 1-9: 0
1. LITTLE INTEREST OR PLEASURE IN DOING THINGS: 0
8. MOVING OR SPEAKING SO SLOWLY THAT OTHER PEOPLE COULD HAVE NOTICED. OR THE OPPOSITE, BEING SO FIGETY OR RESTLESS THAT YOU HAVE BEEN MOVING AROUND A LOT MORE THAN USUAL: 0
7. TROUBLE CONCENTRATING ON THINGS, SUCH AS READING THE NEWSPAPER OR WATCHING TELEVISION: 0
6. FEELING BAD ABOUT YOURSELF - OR THAT YOU ARE A FAILURE OR HAVE LET YOURSELF OR YOUR FAMILY DOWN: 0
SUM OF ALL RESPONSES TO PHQ9 QUESTIONS 1 & 2: 0
10. IF YOU CHECKED OFF ANY PROBLEMS, HOW DIFFICULT HAVE THESE PROBLEMS MADE IT FOR YOU TO DO YOUR WORK, TAKE CARE OF THINGS AT HOME, OR GET ALONG WITH OTHER PEOPLE: 0

## 2023-05-15 ASSESSMENT — LIFESTYLE VARIABLES
HOW MANY STANDARD DRINKS CONTAINING ALCOHOL DO YOU HAVE ON A TYPICAL DAY: PATIENT DOES NOT DRINK
HOW OFTEN DO YOU HAVE A DRINK CONTAINING ALCOHOL: NEVER

## 2023-05-15 NOTE — ACP (ADVANCE CARE PLANNING)
Date of ACP Conversation: 5/15/23  Persons included in Conversation: Patient  Length of ACP Conversation in minutes: 5 minutes    Authorized Decision Maker (if patient is incapable of making informed decisions): NA          For Patients with Decision Making Capacity:   Patient does have a Living Will, Advance Directives and Healthcare Power of . Conversation Outcomes / Follow-Up Plan:   Advised patient to bring a copy of the same to their chart - patient understands and agrees.

## 2023-05-15 NOTE — PROGRESS NOTES
saw Dr Luann Wise. Foot exam was done today. UDS and CSA were ordered today- patient takes Ambien. Return for Medicare Annual Wellness Visit in 1 year. Subjective       Patient's complete Health Risk Assessment and screening values have been reviewed and are found in Flowsheets. The following problems were reviewed today and where indicated follow up appointments were made and/or referrals ordered. Positive Risk Factor Screenings with Interventions:                 Weight and Activity:  Physical Activity: Inactive    Days of Exercise per Week: 0 days    Minutes of Exercise per Session: 0 min     On average, how many days per week do you engage in moderate to strenuous exercise (like a brisk walk)?: 0 days  Have you lost any weight without trying in the past 3 months?: No  Body mass index is 39.94 kg/m². (!) Abnormal    Inactivity Interventions:  Strongly advised patient to exercise regularly  Obesity Interventions:  Advised patient to eat healthy foods, eat smaller portions and exercise regularly- to lose weight                               Objective   Vitals:    05/15/23 0851   BP: 130/76   Pulse: 91   Resp: 16   Temp: 98.3 °F (36.8 °C)   TempSrc: Tympanic   SpO2: 95%   Weight: 255 lb (115.7 kg)   Height: 5' 7\" (1.702 m)      Body mass index is 39.94 kg/m². Allergies   Allergen Reactions    Insulin Glargine Hives    Buspirone Swelling     Cannot swallow, throat swelling    Codeine Other (See Comments)    Hydrocodone Itching    Hydrocodone-Acetaminophen Itching    Liraglutide Swelling    Tramadol Itching     Hot flashes    Trazodone Other (See Comments)     Can not swollow    Dulaglutide Rash     Prior to Visit Medications    Medication Sig Taking?  Authorizing Provider   gabapentin (NEURONTIN) 400 MG capsule Take 2 capsules po after lunch and 2 capsules after dinner and 1 capsule at bed time Yes Armando Puri MD   venlafaxine (EFFEXOR XR) 75 MG extended release capsule Take 3 capsules po once

## 2023-05-15 NOTE — PATIENT INSTRUCTIONS
Learning About Being Active as an Older Adult  Why is being active important as you get older? Being active is one of the best things you can do for your health. And it's never too late to start. Being active--or getting active, if you aren't already--has definite benefits. It can:  Give you more energy,  Keep your mind sharp. Improve balance to reduce your risk of falls. Help you manage chronic illness with fewer medicines. No matter how old you are, how fit you are, or what health problems you have, there is a form of activity that will work for you. And the more physical activity you can do, the better your overall health will be. What kinds of activity can help you stay healthy? Being more active will make your daily activities easier. Physical activity includes planned exercise and things you do in daily life. There are four types of activity:  Aerobic. Doing aerobic activity makes your heart and lungs strong. Includes walking, dancing, and gardening. Aim for at least 2½ hours spread throughout the week. It improves your energy and can help you sleep better. Muscle-strengthening. This type of activity can help maintain muscle and strengthen bones. Includes climbing stairs, using resistance bands, and lifting or carrying heavy loads. Aim for at least twice a week. It can help protect the knees and other joints. Stretching. Stretching gives you better range of motion in joints and muscles. Includes upper arm stretches, calf stretches, and gentle yoga. Aim for at least twice a week, preferably after your muscles are warmed up from other activities. It can help you function better in daily life. Balancing. This helps you stay coordinated and have good posture. Includes heel-to-toe walking, luz chi, and certain types of yoga. Aim for at least 3 days a week. It can reduce your risk of falling.   Even if you have a hard time meeting the recommendations, it's better to be more active

## 2023-06-12 DIAGNOSIS — I10 ESSENTIAL (PRIMARY) HYPERTENSION: ICD-10-CM

## 2023-06-12 RX ORDER — LOSARTAN POTASSIUM 100 MG/1
TABLET ORAL
Qty: 90 TABLET | Refills: 1 | OUTPATIENT
Start: 2023-06-12

## 2023-06-12 RX ORDER — LOSARTAN POTASSIUM 100 MG/1
100 TABLET ORAL DAILY
Qty: 100 TABLET | Refills: 0 | Status: SHIPPED | OUTPATIENT
Start: 2023-06-12 | End: 2023-08-11 | Stop reason: SDUPTHER

## 2023-06-12 NOTE — TELEPHONE ENCOUNTER
[facility] called and wants to fill pts losartan for 100 days instead of 90 Please send in a new prescription for 100 days

## 2023-07-07 ENCOUNTER — HOSPITAL ENCOUNTER (OUTPATIENT)
Dept: GENERAL RADIOLOGY | Age: 68
End: 2023-07-07
Payer: MEDICARE

## 2023-07-07 ENCOUNTER — OFFICE VISIT (OUTPATIENT)
Dept: ORTHOPEDIC SURGERY | Age: 68
End: 2023-07-07

## 2023-07-07 DIAGNOSIS — M25.512 LEFT SHOULDER PAIN, UNSPECIFIED CHRONICITY: ICD-10-CM

## 2023-07-07 DIAGNOSIS — M67.912 TENDINOPATHY OF LEFT ROTATOR CUFF: Primary | ICD-10-CM

## 2023-07-07 DIAGNOSIS — G89.29 CHRONIC LEFT SHOULDER PAIN: ICD-10-CM

## 2023-07-07 DIAGNOSIS — M25.512 CHRONIC LEFT SHOULDER PAIN: ICD-10-CM

## 2023-07-07 PROCEDURE — 73030 X-RAY EXAM OF SHOULDER: CPT

## 2023-07-07 RX ORDER — MULTIVIT-MIN/IRON/FOLIC ACID/K 18-600-40
CAPSULE ORAL
COMMUNITY

## 2023-07-07 RX ORDER — METHYLPREDNISOLONE ACETATE 40 MG/ML
40 INJECTION, SUSPENSION INTRA-ARTICULAR; INTRALESIONAL; INTRAMUSCULAR; SOFT TISSUE ONCE
Status: COMPLETED | OUTPATIENT
Start: 2023-07-07 | End: 2023-07-07

## 2023-07-07 RX ADMIN — METHYLPREDNISOLONE ACETATE 40 MG: 40 INJECTION, SUSPENSION INTRA-ARTICULAR; INTRALESIONAL; INTRAMUSCULAR; SOFT TISSUE at 14:40

## 2023-07-07 NOTE — PROGRESS NOTES
Name: Maine Santo  YOB: 1955  Gender: female  MRN: [id number]  Date of Encounter:  7/7/2023       CHIEF COMPLAINT:     Chief Complaint   Patient presents with    Shoulder Pain     Left shoulder pain for months         SUBJECTIVE/OBJECTIVE:      HPI:    Maine Santo  is a 79 y.o. pleasant female who presents today for a new evaluation of her left shoulder pain. She has had months of left shoulder pain. Pain she localizes to the anterior shoulder. If she tries to carry anything, including her purse. She has pain in her posterior shoulder, the bicep. She does not recall an injury, but she did start lifting wood with her  months ago. She denies neck pain or numbness. She had a similar problem with the right shoulder which improved with injection and therapy. PAST HISTORY:   Past medical, surgical, family, social history and allergies reviewed by me. Pertinent history:   Tobacco use:  reports that she has never smoked. She has never used smokeless tobacco.  Diabetes: diabetic-insulin dependent. Recent A1C 7. Anticoagulation: no      REVIEW OF SYSTEMS:   As noted in HPI. PHYSICAL EXAMINATION:     Gen: Well-developed, no acute distress   HEENT: NC/AT, EOMI   Neck: Trachea midline, normal ROM   CV: Regular rhythm by palpation of distal pulse, normal capillary refill   Pulm: No respiratory distress, no stridor   Psychiatric: Well oriented, normal mood and affect. Skin: No rashes, lesions or ulcers, normal temperature, turgor, and texture on uninvolved extremity. ORTHO EXAM:    Left Shoulder:     Inspection: No deformity or effusion  ROM: Active forward flexion 160, abduction 160, Internal rotation painful and limited to back pocket, External rotation symmetric 45  Tenderness: there is mild tenderness to the bicipital groove and lesser tuberosity.  There is no significant tenderness of cuff footprint  Strength: Abduction 5/5, External rotation 5/5, Internal

## 2023-07-12 ENCOUNTER — HOSPITAL ENCOUNTER (OUTPATIENT)
Dept: SLEEP CENTER | Age: 68
Discharge: HOME | End: 2023-07-15

## 2023-08-02 ENCOUNTER — TELEPHONE (OUTPATIENT)
Dept: SLEEP MEDICINE | Age: 68
End: 2023-08-02

## 2023-08-02 DIAGNOSIS — E78.2 HYPERLIPEMIA, MIXED: ICD-10-CM

## 2023-08-02 DIAGNOSIS — G25.81 RESTLESS LEG SYNDROME: ICD-10-CM

## 2023-08-02 DIAGNOSIS — K21.9 GASTROESOPHAGEAL REFLUX DISEASE WITHOUT ESOPHAGITIS: ICD-10-CM

## 2023-08-02 RX ORDER — ROSUVASTATIN CALCIUM 20 MG/1
TABLET, COATED ORAL
Qty: 90 TABLET | Refills: 1 | OUTPATIENT
Start: 2023-08-02

## 2023-08-02 RX ORDER — ROPINIROLE 3 MG/1
TABLET, FILM COATED ORAL
Qty: 180 TABLET | Refills: 1 | OUTPATIENT
Start: 2023-08-02

## 2023-08-02 RX ORDER — OMEPRAZOLE 40 MG/1
CAPSULE, DELAYED RELEASE ORAL
Qty: 90 CAPSULE | Refills: 1 | OUTPATIENT
Start: 2023-08-02

## 2023-08-08 ASSESSMENT — PATIENT HEALTH QUESTIONNAIRE - PHQ9
SUM OF ALL RESPONSES TO PHQ QUESTIONS 1-9: 18
3. TROUBLE FALLING OR STAYING ASLEEP: 3
5. POOR APPETITE OR OVEREATING: NEARLY EVERY DAY
1. LITTLE INTEREST OR PLEASURE IN DOING THINGS: NOT AT ALL
10. IF YOU CHECKED OFF ANY PROBLEMS, HOW DIFFICULT HAVE THESE PROBLEMS MADE IT FOR YOU TO DO YOUR WORK, TAKE CARE OF THINGS AT HOME, OR GET ALONG WITH OTHER PEOPLE: SOMEWHAT DIFFICULT
7. TROUBLE CONCENTRATING ON THINGS, SUCH AS READING THE NEWSPAPER OR WATCHING TELEVISION: NEARLY EVERY DAY
7. TROUBLE CONCENTRATING ON THINGS, SUCH AS READING THE NEWSPAPER OR WATCHING TELEVISION: 3
8. MOVING OR SPEAKING SO SLOWLY THAT OTHER PEOPLE COULD HAVE NOTICED. OR THE OPPOSITE, BEING SO FIGETY OR RESTLESS THAT YOU HAVE BEEN MOVING AROUND A LOT MORE THAN USUAL: 3
9. THOUGHTS THAT YOU WOULD BE BETTER OFF DEAD, OR OF HURTING YOURSELF: NOT AT ALL
SUM OF ALL RESPONSES TO PHQ QUESTIONS 1-9: 18
SUM OF ALL RESPONSES TO PHQ9 QUESTIONS 1 & 2: 0
4. FEELING TIRED OR HAVING LITTLE ENERGY: NEARLY EVERY DAY
SUM OF ALL RESPONSES TO PHQ QUESTIONS 1-9: 18
SUM OF ALL RESPONSES TO PHQ QUESTIONS 1-9: 18
6. FEELING BAD ABOUT YOURSELF - OR THAT YOU ARE A FAILURE OR HAVE LET YOURSELF OR YOUR FAMILY DOWN: 3
5. POOR APPETITE OR OVEREATING: 3
2. FEELING DOWN, DEPRESSED OR HOPELESS: NOT AT ALL
3. TROUBLE FALLING OR STAYING ASLEEP: NEARLY EVERY DAY
2. FEELING DOWN, DEPRESSED OR HOPELESS: 0
6. FEELING BAD ABOUT YOURSELF - OR THAT YOU ARE A FAILURE OR HAVE LET YOURSELF OR YOUR FAMILY DOWN: NEARLY EVERY DAY
9. THOUGHTS THAT YOU WOULD BE BETTER OFF DEAD, OR OF HURTING YOURSELF: 0
SUM OF ALL RESPONSES TO PHQ QUESTIONS 1-9: 18
4. FEELING TIRED OR HAVING LITTLE ENERGY: 3
1. LITTLE INTEREST OR PLEASURE IN DOING THINGS: 0
10. IF YOU CHECKED OFF ANY PROBLEMS, HOW DIFFICULT HAVE THESE PROBLEMS MADE IT FOR YOU TO DO YOUR WORK, TAKE CARE OF THINGS AT HOME, OR GET ALONG WITH OTHER PEOPLE: 1
8. MOVING OR SPEAKING SO SLOWLY THAT OTHER PEOPLE COULD HAVE NOTICED. OR THE OPPOSITE - BEING SO FIDGETY OR RESTLESS THAT YOU HAVE BEEN MOVING AROUND A LOT MORE THAN USUAL: NEARLY EVERY DAY

## 2023-08-10 DIAGNOSIS — G25.81 RESTLESS LEG SYNDROME: ICD-10-CM

## 2023-08-10 DIAGNOSIS — E78.2 HYPERLIPEMIA, MIXED: ICD-10-CM

## 2023-08-10 RX ORDER — ROSUVASTATIN CALCIUM 20 MG/1
20 TABLET, COATED ORAL DAILY
Qty: 100 TABLET | Refills: 1 | Status: SHIPPED | OUTPATIENT
Start: 2023-08-10 | End: 2023-08-11 | Stop reason: SDUPTHER

## 2023-08-10 RX ORDER — ROSUVASTATIN CALCIUM 20 MG/1
TABLET, COATED ORAL
Qty: 90 TABLET | Refills: 1 | OUTPATIENT
Start: 2023-08-10

## 2023-08-11 ENCOUNTER — OFFICE VISIT (OUTPATIENT)
Dept: FAMILY MEDICINE CLINIC | Facility: CLINIC | Age: 68
End: 2023-08-11
Payer: MEDICARE

## 2023-08-11 VITALS
SYSTOLIC BLOOD PRESSURE: 120 MMHG | DIASTOLIC BLOOD PRESSURE: 70 MMHG | OXYGEN SATURATION: 95 % | TEMPERATURE: 97.6 F | BODY MASS INDEX: 39.24 KG/M2 | HEIGHT: 67 IN | WEIGHT: 250 LBS | HEART RATE: 86 BPM | RESPIRATION RATE: 16 BRPM

## 2023-08-11 DIAGNOSIS — E78.2 HYPERLIPEMIA, MIXED: ICD-10-CM

## 2023-08-11 DIAGNOSIS — F51.04 PSYCHOPHYSIOLOGICAL INSOMNIA: ICD-10-CM

## 2023-08-11 DIAGNOSIS — G25.81 RESTLESS LEG SYNDROME: Primary | ICD-10-CM

## 2023-08-11 DIAGNOSIS — I10 ESSENTIAL (PRIMARY) HYPERTENSION: ICD-10-CM

## 2023-08-11 DIAGNOSIS — F33.0 MAJOR DEPRESSIVE DISORDER, RECURRENT, MILD (HCC): ICD-10-CM

## 2023-08-11 DIAGNOSIS — F41.1 GAD (GENERALIZED ANXIETY DISORDER): ICD-10-CM

## 2023-08-11 DIAGNOSIS — N39.41 URGE INCONTINENCE: ICD-10-CM

## 2023-08-11 DIAGNOSIS — J45.20 MILD INTERMITTENT ASTHMA WITHOUT COMPLICATION: ICD-10-CM

## 2023-08-11 LAB
ALBUMIN SERPL-MCNC: 3.6 G/DL (ref 3.2–4.6)
ALBUMIN/GLOB SERPL: 0.9 (ref 0.4–1.6)
ALP SERPL-CCNC: 78 U/L (ref 50–136)
ALT SERPL-CCNC: 19 U/L (ref 12–65)
ANION GAP SERPL CALC-SCNC: 5 MMOL/L (ref 2–11)
AST SERPL-CCNC: 12 U/L (ref 15–37)
BASOPHILS # BLD: 0.1 K/UL (ref 0–0.2)
BASOPHILS NFR BLD: 1 % (ref 0–2)
BILIRUB SERPL-MCNC: 0.3 MG/DL (ref 0.2–1.1)
BILIRUBIN, URINE, POC: NEGATIVE
BLOOD URINE, POC: NEGATIVE
BUN SERPL-MCNC: 16 MG/DL (ref 8–23)
CALCIUM SERPL-MCNC: 9.9 MG/DL (ref 8.3–10.4)
CHLORIDE SERPL-SCNC: 105 MMOL/L (ref 101–110)
CHOLEST SERPL-MCNC: 116 MG/DL
CO2 SERPL-SCNC: 31 MMOL/L (ref 21–32)
CREAT SERPL-MCNC: 1.1 MG/DL (ref 0.6–1)
DIFFERENTIAL METHOD BLD: ABNORMAL
EOSINOPHIL # BLD: 0.3 K/UL (ref 0–0.8)
EOSINOPHIL NFR BLD: 4 % (ref 0.5–7.8)
ERYTHROCYTE [DISTWIDTH] IN BLOOD BY AUTOMATED COUNT: 16.9 % (ref 11.9–14.6)
GLOBULIN SER CALC-MCNC: 3.8 G/DL (ref 2.8–4.5)
GLUCOSE SERPL-MCNC: 98 MG/DL (ref 65–100)
GLUCOSE URINE, POC: ABNORMAL
HCT VFR BLD AUTO: 42 % (ref 35.8–46.3)
HDLC SERPL-MCNC: 60 MG/DL (ref 40–60)
HDLC SERPL: 1.9
HGB BLD-MCNC: 12.4 G/DL (ref 11.7–15.4)
IMM GRANULOCYTES # BLD AUTO: 0 K/UL (ref 0–0.5)
IMM GRANULOCYTES NFR BLD AUTO: 0 % (ref 0–5)
KETONES, URINE, POC: NEGATIVE
LDLC SERPL CALC-MCNC: 27.6 MG/DL
LEUKOCYTE ESTERASE, URINE, POC: NEGATIVE
LYMPHOCYTES # BLD: 1.9 K/UL (ref 0.5–4.6)
LYMPHOCYTES NFR BLD: 29 % (ref 13–44)
MCH RBC QN AUTO: 24.9 PG (ref 26.1–32.9)
MCHC RBC AUTO-ENTMCNC: 29.5 G/DL (ref 31.4–35)
MCV RBC AUTO: 84.5 FL (ref 82–102)
MONOCYTES # BLD: 0.4 K/UL (ref 0.1–1.3)
MONOCYTES NFR BLD: 6 % (ref 4–12)
NEUTS SEG # BLD: 4.1 K/UL (ref 1.7–8.2)
NEUTS SEG NFR BLD: 61 % (ref 43–78)
NITRITE, URINE, POC: NEGATIVE
NRBC # BLD: 0 K/UL (ref 0–0.2)
PH, URINE, POC: 6.5 (ref 4.6–8)
PLATELET # BLD AUTO: 227 K/UL (ref 150–450)
PMV BLD AUTO: 10.1 FL (ref 9.4–12.3)
POTASSIUM SERPL-SCNC: 3.9 MMOL/L (ref 3.5–5.1)
PROT SERPL-MCNC: 7.4 G/DL (ref 6.3–8.2)
PROTEIN,URINE, POC: NEGATIVE
RBC # BLD AUTO: 4.97 M/UL (ref 4.05–5.2)
SODIUM SERPL-SCNC: 141 MMOL/L (ref 133–143)
SPECIFIC GRAVITY, URINE, POC: 1.01 (ref 1–1.03)
TRIGL SERPL-MCNC: 142 MG/DL (ref 35–150)
URINALYSIS CLARITY, POC: CLEAR
URINALYSIS COLOR, POC: YELLOW
UROBILINOGEN, POC: ABNORMAL
VLDLC SERPL CALC-MCNC: 28.4 MG/DL (ref 6–23)
WBC # BLD AUTO: 6.7 K/UL (ref 4.3–11.1)

## 2023-08-11 PROCEDURE — 1123F ACP DISCUSS/DSCN MKR DOCD: CPT | Performed by: FAMILY MEDICINE

## 2023-08-11 PROCEDURE — 3074F SYST BP LT 130 MM HG: CPT | Performed by: FAMILY MEDICINE

## 2023-08-11 PROCEDURE — 99215 OFFICE O/P EST HI 40 MIN: CPT | Performed by: FAMILY MEDICINE

## 2023-08-11 PROCEDURE — 81003 URINALYSIS AUTO W/O SCOPE: CPT | Performed by: FAMILY MEDICINE

## 2023-08-11 PROCEDURE — 3078F DIAST BP <80 MM HG: CPT | Performed by: FAMILY MEDICINE

## 2023-08-11 RX ORDER — GABAPENTIN 400 MG/1
CAPSULE ORAL
Qty: 360 CAPSULE | Refills: 1 | Status: SHIPPED | OUTPATIENT
Start: 2023-08-11 | End: 2023-09-11

## 2023-08-11 RX ORDER — LOSARTAN POTASSIUM 100 MG/1
100 TABLET ORAL DAILY
Qty: 90 TABLET | Refills: 1 | Status: SHIPPED | OUTPATIENT
Start: 2023-08-11 | End: 2024-02-07

## 2023-08-11 RX ORDER — FAMOTIDINE 40 MG/1
40 TABLET, FILM COATED ORAL 2 TIMES DAILY
Qty: 180 TABLET | Refills: 1 | Status: CANCELLED | OUTPATIENT
Start: 2023-08-11

## 2023-08-11 RX ORDER — GLIPIZIDE 5 MG/1
5 TABLET ORAL 2 TIMES DAILY
Qty: 180 TABLET | Refills: 1 | Status: CANCELLED | OUTPATIENT
Start: 2023-08-11

## 2023-08-11 RX ORDER — VENLAFAXINE HYDROCHLORIDE 225 MG/1
225 TABLET, EXTENDED RELEASE ORAL
Qty: 90 TABLET | Refills: 1 | Status: CANCELLED | OUTPATIENT
Start: 2023-08-11

## 2023-08-11 RX ORDER — GABAPENTIN 400 MG/1
CAPSULE ORAL
Qty: 150 CAPSULE | Refills: 3 | OUTPATIENT
Start: 2023-08-11

## 2023-08-11 RX ORDER — PANTOPRAZOLE SODIUM 40 MG/1
40 TABLET, DELAYED RELEASE ORAL DAILY
Qty: 90 TABLET | Refills: 1 | Status: CANCELLED | OUTPATIENT
Start: 2023-08-11

## 2023-08-11 RX ORDER — ROSUVASTATIN CALCIUM 20 MG/1
20 TABLET, COATED ORAL DAILY
Qty: 90 TABLET | Refills: 1 | Status: SHIPPED | OUTPATIENT
Start: 2023-08-11

## 2023-08-11 RX ORDER — FLUTICASONE PROPIONATE AND SALMETEROL XINAFOATE 230; 21 UG/1; UG/1
2 AEROSOL, METERED RESPIRATORY (INHALATION) 2 TIMES DAILY
Qty: 1 EACH | Refills: 5 | Status: SHIPPED | OUTPATIENT
Start: 2023-08-11

## 2023-08-11 RX ORDER — VENLAFAXINE HYDROCHLORIDE 75 MG/1
CAPSULE, EXTENDED RELEASE ORAL
Qty: 270 CAPSULE | Refills: 1 | Status: SHIPPED | OUTPATIENT
Start: 2023-08-11

## 2023-08-11 RX ORDER — TOLTERODINE TARTRATE 2 MG/1
2 TABLET, EXTENDED RELEASE ORAL 2 TIMES DAILY
Qty: 180 TABLET | Refills: 1 | Status: SHIPPED | OUTPATIENT
Start: 2023-08-11

## 2023-08-11 RX ORDER — ROPINIROLE 3 MG/1
3 TABLET, FILM COATED ORAL 2 TIMES DAILY
Qty: 180 TABLET | Refills: 1 | Status: SHIPPED | OUTPATIENT
Start: 2023-08-11

## 2023-08-11 ASSESSMENT — ENCOUNTER SYMPTOMS
ABDOMINAL PAIN: 0
SORE THROAT: 0
WHEEZING: 0
NAUSEA: 1
COUGH: 1
SHORTNESS OF BREATH: 0
BLOOD IN STOOL: 0
ANAL BLEEDING: 0
DIARRHEA: 0
VOMITING: 1
APNEA: 1

## 2023-08-11 NOTE — PROGRESS NOTES
continue to avoid salt and fatty foods, monitor BP- keep a log. To restart doing Kiegel's exercises, restart Detrol bid. Advised patient to lose weight. Also advised to exercise regularly, to eat healthy foods, and to control portion sizes. Advised to take Flu and Covid vaccine booster at the pharmacy. 08/11/23I have spent 63 minutes reviewing previous notes, test results and face-to-face with the patient discussing the diagnosis and importance of compliance with the treatment plan as well as documenting on the day of the visit. Return in about 6 months (around 2/11/2024) for medication refills, fasting labs or prn sooner. Subjective   SUBJECTIVE/OBJECTIVE:  Medication Refill  Associated symptoms include coughing, fatigue, nausea, numbness and vomiting. Pertinent negatives include no abdominal pain, chest pain, chills, congestion, fever, headaches, sore throat or weakness. Patient comes today for her 6 month follow up and medication refills-  Chronic Asthma- she has never smoked before, her 1st  smoked 3 ppd but they were  for only 3 years; she is using Advair HFA bid but not every day- says that's because she has so much to take, tried Spiriva in the past; uses Albuterol HFA prn- may be 2 times a month. She denies issues with shortness of breath or wheezing on a daily basis; but has a cough. They heat their house with wood and that can bother her or if the air is heavy outside. Says her symptoms are worse with the pollen, also heat and humidity bother her- done pretty well this summer. Insomnia, Anxiety, Depression - ongoing for years; was diagnosed with anxiety in 5th grade- was put on anxiety medicine which helped, had breathing issues then. She denies any HI/ SI; does say that depression is not a problem now but still has some anxiety off and on.  She tapered and stopped the Pristiq  mg- (bothered her stomach but felt calmer); now takes Venlafaxine XR 75 mg 1 capsule in

## 2023-08-15 NOTE — PROGRESS NOTES
ferritin level and if it is less than 50 it would require iron supplementation replacement    Maintain her follow-up with other providers    Return to the sleep center in 4 months or sooner if needed      Orders Placed This Encounter   Procedures    DME - [address] [facility]  Phone: [address] [facility] [address] [facility] [address]  Dept: [phone]      Patient Name: Inga Schmitt  : 1955  Gender: female  Address: [address]  Patient phone number: [phone] (home)       Primary Insurance: Payor: Mariusz Tsai / Plan: Luis Eduardo Velazquez PPO / Product Type: Medicare /   Subscriber ID: [id number] - (Medicare Managed)      AMB Supply Order  Order Details     DME Location:   Order Date: 2023   The primary encounter diagnosis was Obstructive sleep apnea syndrome. Diagnoses of Psychophysiological insomnia, Restless leg syndrome, Daytime sleepiness, and Severe obesity (BMI 35.0-39. 9) with comorbidity ([address]) were also pertinent to this visit. (  X   )New Set-Up       machine   (     ) CPAP Unit  (  x   ) Auto CPAP Unit  (     ) BiLevel Unit  (     ) Auto BiLevel Unit  (     ) ASV         Length of need: 12 months    Pressure: 7-15  cmH20  EPR: 3  Ramp Time:  30 min    Starting Ramp Pressure:  5  cm H20    Patient had a diagnostic Apnea Hypopnea Index (AHI) of :  16.1/hr        *SUPPLIES* Replace all as needed, or per coverage guidelines     Masks Type: Patient prefers the N30 I or P30 I mask please.   Please do an overnight oximetry in 1 month after CPAP set up    (   ) -Full Face Mask (1 per 3 mon)  (    ) -Full Mask (1 per month) Interface/Cushion      (    ) -Nasal Mask (1 per 3 mon)  (    ) - Nasal Mask (1 per month) Interface/Cushion  (    ) -Pillow (2 per mon)  (    ) -Ymduiyjib (1 per 6

## 2023-08-16 ENCOUNTER — OFFICE VISIT (OUTPATIENT)
Dept: SLEEP MEDICINE | Age: 68
End: 2023-08-16
Payer: MEDICARE

## 2023-08-16 VITALS
SYSTOLIC BLOOD PRESSURE: 112 MMHG | RESPIRATION RATE: 14 BRPM | BODY MASS INDEX: 38.77 KG/M2 | WEIGHT: 247 LBS | DIASTOLIC BLOOD PRESSURE: 72 MMHG | OXYGEN SATURATION: 97 % | HEIGHT: 67 IN | HEART RATE: 80 BPM

## 2023-08-16 DIAGNOSIS — G47.33 OBSTRUCTIVE SLEEP APNEA SYNDROME: Primary | ICD-10-CM

## 2023-08-16 DIAGNOSIS — E66.01 SEVERE OBESITY (BMI 35.0-39.9) WITH COMORBIDITY (HCC): ICD-10-CM

## 2023-08-16 DIAGNOSIS — G25.81 RESTLESS LEG SYNDROME: ICD-10-CM

## 2023-08-16 DIAGNOSIS — R40.0 DAYTIME SLEEPINESS: ICD-10-CM

## 2023-08-16 DIAGNOSIS — F51.04 PSYCHOPHYSIOLOGICAL INSOMNIA: ICD-10-CM

## 2023-08-16 PROCEDURE — 99215 OFFICE O/P EST HI 40 MIN: CPT | Performed by: INTERNAL MEDICINE

## 2023-08-16 PROCEDURE — 1123F ACP DISCUSS/DSCN MKR DOCD: CPT | Performed by: INTERNAL MEDICINE

## 2023-08-16 PROCEDURE — 3074F SYST BP LT 130 MM HG: CPT | Performed by: INTERNAL MEDICINE

## 2023-08-16 PROCEDURE — 3078F DIAST BP <80 MM HG: CPT | Performed by: INTERNAL MEDICINE

## 2023-08-16 ASSESSMENT — SLEEP AND FATIGUE QUESTIONNAIRES
HOW LIKELY ARE YOU TO NOD OFF OR FALL ASLEEP WHILE SITTING INACTIVE IN A PUBLIC PLACE: 1
HOW LIKELY ARE YOU TO NOD OFF OR FALL ASLEEP WHILE SITTING AND READING: 1
HOW LIKELY ARE YOU TO NOD OFF OR FALL ASLEEP IN A CAR, WHILE STOPPED FOR A FEW MINUTES IN TRAFFIC: 0
HOW LIKELY ARE YOU TO NOD OFF OR FALL ASLEEP WHILE WATCHING TV: 1
ESS TOTAL SCORE: 8
HOW LIKELY ARE YOU TO NOD OFF OR FALL ASLEEP WHILE LYING DOWN TO REST IN THE AFTERNOON WHEN CIRCUMSTANCES PERMIT: 2
HOW LIKELY ARE YOU TO NOD OFF OR FALL ASLEEP WHILE SITTING QUIETLY AFTER LUNCH WITHOUT ALCOHOL: 1
HOW LIKELY ARE YOU TO NOD OFF OR FALL ASLEEP WHEN YOU ARE A PASSENGER IN A CAR FOR AN HOUR WITHOUT A BREAK: 1
HOW LIKELY ARE YOU TO NOD OFF OR FALL ASLEEP WHILE SITTING AND TALKING TO SOMEONE: 1

## 2023-08-16 NOTE — PATIENT INSTRUCTIONS
The company who will be taking care of your Sleep Hygiene Instructions    Sleep only as much as you need to feel refreshed during the following day. Restricting your time in bed helps to consolidate and deepen your sleep. Excessively long times in bed lead to fragmented and shallow sleep. Get up at your regular time the next day, no matter how little your slept. Get up at the same time each day, 7 days a week. A regular wake time in the morning leads to regular times on sleep onset, and helps to set your biological clock. Exercise regularly. Schedule exercise times so that they do not occur within 3 hours of when you intend to go to bed. Exercise makes it easier to initiate sleep and deepen sleep. Don't take your problems to bed. Plan some time earlier in the evening for working on your problems or planning the next day's activities. Worrying may interfere with initiating sleep and produce shallow sleep. Train yourself to use the bedroom only for sleep and sexual activity. This will help condition your brain to see bed as the place for sleeping. Do not read, watch TV or eat in bed. Do not try and fall asleep. This only makes the problem worse. Instead, turn on the light, leave the bedroom, and do something different like reading a book. Don't engage in stimulating activity. Return to bed only when you feel sleepy. Avoid long naps. Staying awake during the day helps to fall asleep at night. Naps totalling more than 30 minutes increase your chances of having trouble sleeping at night. Make sure that your bedroom is comfortable and free from light and noise. A comfortable, noise-free sleep environment will reduce the likelihood that you will wake up during the night. Noise that does not awaken you may disturb the quality of your sleep. Carpeting, insulated curtains, and closing the door may help. Make sure that your bedroom is at a comfortable temperature during the night.  Excessively warm or

## 2023-08-17 NOTE — PROGRESS NOTES
Name: Domitila Ward  YOB: 1955  Gender: female  MRN: [id number]  Date of Encounter:  8/18/2023       CHIEF COMPLAINT:     Chief Complaint   Patient presents with    Follow-up     Left Shoulder        SUBJECTIVE/OBJECTIVE:      HPI:    Patient is a 79 y.o. pleasant female who presents today for a return evaluation of her left shoulder. Working diagnosis:   1. Tendinopathy of left rotator cuff    2. Biceps tendonitis on left       LOV: 7/7/2023     Recall hx: She had great relief with the injection but pain came back about a week ago. There was no new injury. She cannot take oral NSAIDs and tylenol isnt helpful. She has tried diclofenac. She has no interest in surgery, however. PAST HISTORY:   Past medical, surgical, family, social history and allergies reviewed by me. Unchanged from prior visit. REVIEW OF SYSTEMS:   As noted in HPI. PHYSICAL EXAMINATION:     Gen: Well-developed, no acute distress   HEENT: NC/AT, EOMI   Neck: Trachea midline, normal ROM   CV: Regular rhythm by palpation of distal pulse, normal capillary refill   Pulm: No respiratory distress, no stridor   Psychiatric: Well oriented, normal mood and affect. Skin: No rashes, lesions or ulcers, normal temperature, turgor, and texture on uninvolved extremity. ORTHO EXAM:    Left Shoulder:      Inspection: No deformity or effusion  ROM: Active forward flexion 160, abduction 160, Internal rotation painful and limited to back pocket, External rotation symmetric 45  Tenderness: tenderness to the bicipital groove and lesser tuberosity, posterior cuff footprint  Strength: Abduction 5/5, External rotation 5/5, Internal rotation 4/5  Provocative tests: Negative Jobes, Coe, Neer. Positive Belly Lift-off, Speeds  Normal capillary refill / 2+ radial pulse   Sensation intact to light touch       DIAGNOSTIC IMAGING:     I have reviewed prior imaging studies. ASSESSMENT/PLAN:   1.  Tendinopathy of left rotator cuff

## 2023-08-18 ENCOUNTER — OFFICE VISIT (OUTPATIENT)
Dept: ORTHOPEDIC SURGERY | Age: 68
End: 2023-08-18
Payer: MEDICARE

## 2023-08-18 DIAGNOSIS — M75.22 BICEPS TENDONITIS ON LEFT: ICD-10-CM

## 2023-08-18 DIAGNOSIS — M67.912 TENDINOPATHY OF LEFT ROTATOR CUFF: Primary | ICD-10-CM

## 2023-08-18 PROCEDURE — 1123F ACP DISCUSS/DSCN MKR DOCD: CPT | Performed by: STUDENT IN AN ORGANIZED HEALTH CARE EDUCATION/TRAINING PROGRAM

## 2023-08-18 PROCEDURE — 99213 OFFICE O/P EST LOW 20 MIN: CPT | Performed by: STUDENT IN AN ORGANIZED HEALTH CARE EDUCATION/TRAINING PROGRAM

## 2023-09-05 ENCOUNTER — TELEPHONE (OUTPATIENT)
Dept: FAMILY MEDICINE CLINIC | Facility: CLINIC | Age: 68
End: 2023-09-05

## 2023-09-05 DIAGNOSIS — G62.9 PERIPHERAL POLYNEUROPATHY: Primary | ICD-10-CM

## 2023-09-05 NOTE — TELEPHONE ENCOUNTER
Pt. Called in stating she went to her  Appointment this morning, the stated they could not find where pt's vitamin B12 and Folate levels were checked, they are needing results faxed to them [phone], she states she signed a medical release this morning with them

## 2023-09-14 ENCOUNTER — NURSE ONLY (OUTPATIENT)
Dept: FAMILY MEDICINE CLINIC | Facility: CLINIC | Age: 68
End: 2023-09-14

## 2023-09-14 DIAGNOSIS — G62.9 PERIPHERAL POLYNEUROPATHY: ICD-10-CM

## 2023-09-14 LAB
FOLATE SERPL-MCNC: 16.8 NG/ML (ref 3.1–17.5)
VIT B12 SERPL-MCNC: 181 PG/ML (ref 193–986)

## 2023-10-10 ENCOUNTER — OFFICE VISIT (OUTPATIENT)
Dept: ORTHOPEDIC SURGERY | Age: 68
End: 2023-10-10

## 2023-10-10 DIAGNOSIS — M67.912 TENDINOPATHY OF LEFT ROTATOR CUFF: Primary | ICD-10-CM

## 2023-10-10 DIAGNOSIS — M75.22 BICEPS TENDONITIS ON LEFT: ICD-10-CM

## 2023-10-10 RX ORDER — METHYLPREDNISOLONE ACETATE 40 MG/ML
40 INJECTION, SUSPENSION INTRA-ARTICULAR; INTRALESIONAL; INTRAMUSCULAR; SOFT TISSUE ONCE
Status: COMPLETED | OUTPATIENT
Start: 2023-10-10 | End: 2023-10-10

## 2023-10-10 RX ADMIN — METHYLPREDNISOLONE ACETATE 40 MG: 40 INJECTION, SUSPENSION INTRA-ARTICULAR; INTRALESIONAL; INTRAMUSCULAR; SOFT TISSUE at 10:30

## 2023-10-10 NOTE — PATIENT INSTRUCTIONS
- Fevers, chills, or sweats or any other concerning symptoms    - Any unexpected swelling, weakness, skin rash, itching or fever     What to expect:   - It may take up to 7-14 days for the steroid medication to have a noticeable effect   - If you have diabetes, please check your blood sugar regularly after a steroid injection as this may cause a spike in blood sugar levels     What you can and can't do after this injection:    - You may resume normal daily (non-exercise) activities tomorrow   - Refrain from strenuous activity for the next week but may do daily activities.    - No formal physical therapy or home exercises for 48 hours (stretching is okay)

## 2023-11-07 ENCOUNTER — TELEPHONE (OUTPATIENT)
Dept: SLEEP MEDICINE | Age: 68
End: 2023-11-07

## 2023-11-07 NOTE — TELEPHONE ENCOUNTER
Tried calling pt to discuss her ZULAY results, pt's phone msg says it's not accepting calls.  Will send ,msg via My Chart

## 2023-11-07 NOTE — TELEPHONE ENCOUNTER
----- Message from Fran Matthew MD sent at 11/7/2023  9:10 AM EST -----  The overnight oximetry on this patient was reviewed. This was done on CPAP and indicated no significant oxygen desaturation is noted. Patient had oxygen saturations in at 89% or less for only 3 minutes and 16 seconds. Patient need to continue her CPAP at the current setting and maintain her follow-up in the office as scheduled.   ----- Message -----  From: Kinga Casas  Sent: 11/2/2023   3:28 PM EST  To: Fran Matthew MD

## 2023-12-17 DIAGNOSIS — G25.81 RESTLESS LEG SYNDROME: ICD-10-CM

## 2023-12-18 ENCOUNTER — TELEPHONE (OUTPATIENT)
Dept: SLEEP MEDICINE | Age: 68
End: 2023-12-18

## 2023-12-19 RX ORDER — ROPINIROLE 3 MG/1
3 TABLET, FILM COATED ORAL 2 TIMES DAILY
Qty: 180 TABLET | Refills: 1 | OUTPATIENT
Start: 2023-12-19

## 2023-12-29 DIAGNOSIS — I10 ESSENTIAL (PRIMARY) HYPERTENSION: ICD-10-CM

## 2023-12-29 RX ORDER — LOSARTAN POTASSIUM 100 MG/1
100 TABLET ORAL DAILY
Qty: 90 TABLET | Refills: 1 | OUTPATIENT
Start: 2023-12-29

## 2024-02-05 NOTE — PROGRESS NOTES
Name: Saskia Art  YOB: 1955  Gender: female  MRN: [id number]  Date of Encounter:  2/6/2024       CHIEF COMPLAINT:     Chief Complaint   Patient presents with    Follow-up     Left Shoulder        SUBJECTIVE/OBJECTIVE:      HPI:    Patient is a 68 y.o. pleasant female who presents today for a return evaluation of her left shoulder.    Working diagnosis:   1. Tendinopathy of left rotator cuff    2. Biceps tendonitis on left       LOV: 10/10/2023     Recall hx:   She has had months of left shoulder pain. Pain she localizes to the anterior shoulder. If she tries to carry anything, including her purse. She has pain in her posterior shoulder, the bicep. She does not recall an injury, but she did start lifting wood with her  months ago. She denies neck pain or numbness.      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 Examination consistent with cuff tendinosis and biceps tendinosis.     We performed a LGHJ injection, which gave her good pain relief for around a month, then pain returned. She has pain with sleep the most, and lifting objects. There was no new injury. She cannot take oral NSAIDs and tylenol isnt helpful. She has tried diclofenac. She has no interest in surgery, however.      10/10/23: discussed obtaining MR imaging, but she wished to repeat injection. repeat GHJ injection performed  2/6/24: last injection gave her good relief, almost 4 months. She has had some recent increased soreness the last 3 weeks. She is requesting repeat injection.        PAST HISTORY:   Past medical, surgical, family, social history and allergies reviewed by me. Unchanged from prior visit.     REVIEW OF SYSTEMS:   As noted in HPI.     PHYSICAL EXAMINATION:     Gen: Well-developed, no acute distress   HEENT: NC/AT, EOMI   Neck: Trachea midline, normal ROM   CV: Regular rhythm by palpation of distal pulse, normal capillary refill   Pulm: No respiratory distress, no stridor

## 2024-02-06 ENCOUNTER — OFFICE VISIT (OUTPATIENT)
Dept: ORTHOPEDIC SURGERY | Age: 69
End: 2024-02-06
Payer: MEDICARE

## 2024-02-06 DIAGNOSIS — M75.22 BICEPS TENDONITIS ON LEFT: ICD-10-CM

## 2024-02-06 DIAGNOSIS — M67.912 TENDINOPATHY OF LEFT ROTATOR CUFF: Primary | ICD-10-CM

## 2024-02-06 DIAGNOSIS — G25.81 RESTLESS LEG SYNDROME: ICD-10-CM

## 2024-02-06 PROCEDURE — 20611 DRAIN/INJ JOINT/BURSA W/US: CPT | Performed by: STUDENT IN AN ORGANIZED HEALTH CARE EDUCATION/TRAINING PROGRAM

## 2024-02-06 PROCEDURE — 1123F ACP DISCUSS/DSCN MKR DOCD: CPT | Performed by: STUDENT IN AN ORGANIZED HEALTH CARE EDUCATION/TRAINING PROGRAM

## 2024-02-06 PROCEDURE — 99213 OFFICE O/P EST LOW 20 MIN: CPT | Performed by: STUDENT IN AN ORGANIZED HEALTH CARE EDUCATION/TRAINING PROGRAM

## 2024-02-06 RX ORDER — METHYLPREDNISOLONE ACETATE 80 MG/ML
80 INJECTION, SUSPENSION INTRA-ARTICULAR; INTRALESIONAL; INTRAMUSCULAR; SOFT TISSUE ONCE
Status: COMPLETED | OUTPATIENT
Start: 2024-02-06 | End: 2024-02-06

## 2024-02-06 RX ORDER — GABAPENTIN 400 MG/1
CAPSULE ORAL
Qty: 360 CAPSULE | Refills: 1 | OUTPATIENT
Start: 2024-02-06

## 2024-02-06 RX ADMIN — METHYLPREDNISOLONE ACETATE 40 MG: 80 INJECTION, SUSPENSION INTRA-ARTICULAR; INTRALESIONAL; INTRAMUSCULAR; SOFT TISSUE at 09:46

## 2024-02-07 ASSESSMENT — ENCOUNTER SYMPTOMS
SORE THROAT: 0
BLOOD IN STOOL: 0
APNEA: 1
WHEEZING: 0
DIARRHEA: 0
ABDOMINAL PAIN: 0
NAUSEA: 1
SHORTNESS OF BREATH: 0
ANAL BLEEDING: 0

## 2024-02-08 ENCOUNTER — OFFICE VISIT (OUTPATIENT)
Dept: FAMILY MEDICINE CLINIC | Facility: CLINIC | Age: 69
End: 2024-02-08
Payer: MEDICARE

## 2024-02-08 VITALS
DIASTOLIC BLOOD PRESSURE: 82 MMHG | BODY MASS INDEX: 40.43 KG/M2 | HEIGHT: 67 IN | SYSTOLIC BLOOD PRESSURE: 130 MMHG | WEIGHT: 257.6 LBS | HEART RATE: 97 BPM | OXYGEN SATURATION: 99 % | TEMPERATURE: 97.3 F | RESPIRATION RATE: 16 BRPM

## 2024-02-08 DIAGNOSIS — F41.1 GAD (GENERALIZED ANXIETY DISORDER): ICD-10-CM

## 2024-02-08 DIAGNOSIS — G25.81 RESTLESS LEG SYNDROME: ICD-10-CM

## 2024-02-08 DIAGNOSIS — E78.2 HYPERLIPEMIA, MIXED: ICD-10-CM

## 2024-02-08 DIAGNOSIS — E11.40 TYPE 2 DIABETES MELLITUS WITH DIABETIC NEUROPATHY, WITH LONG-TERM CURRENT USE OF INSULIN (HCC): ICD-10-CM

## 2024-02-08 DIAGNOSIS — E66.01 MORBID OBESITY DUE TO EXCESS CALORIES (HCC): ICD-10-CM

## 2024-02-08 DIAGNOSIS — F33.0 MAJOR DEPRESSIVE DISORDER, RECURRENT, MILD (HCC): ICD-10-CM

## 2024-02-08 DIAGNOSIS — F51.04 PSYCHOPHYSIOLOGICAL INSOMNIA: ICD-10-CM

## 2024-02-08 DIAGNOSIS — K21.9 GASTROESOPHAGEAL REFLUX DISEASE WITHOUT ESOPHAGITIS: ICD-10-CM

## 2024-02-08 DIAGNOSIS — Z79.4 TYPE 2 DIABETES MELLITUS WITH DIABETIC NEUROPATHY, WITH LONG-TERM CURRENT USE OF INSULIN (HCC): ICD-10-CM

## 2024-02-08 DIAGNOSIS — G62.9 PERIPHERAL POLYNEUROPATHY: ICD-10-CM

## 2024-02-08 DIAGNOSIS — N39.41 URGE INCONTINENCE: ICD-10-CM

## 2024-02-08 DIAGNOSIS — J45.20 MILD INTERMITTENT ASTHMA WITHOUT COMPLICATION: Primary | ICD-10-CM

## 2024-02-08 DIAGNOSIS — I10 ESSENTIAL (PRIMARY) HYPERTENSION: ICD-10-CM

## 2024-02-08 LAB
ALBUMIN SERPL-MCNC: 3.8 G/DL (ref 3.2–4.6)
ALBUMIN/GLOB SERPL: 1 (ref 0.4–1.6)
ALP SERPL-CCNC: 79 U/L (ref 50–136)
ALT SERPL-CCNC: 34 U/L (ref 12–65)
ANION GAP SERPL CALC-SCNC: 2 MMOL/L (ref 2–11)
AST SERPL-CCNC: 33 U/L (ref 15–37)
BASOPHILS # BLD: 0.1 K/UL (ref 0–0.2)
BASOPHILS NFR BLD: 1 % (ref 0–2)
BILIRUB SERPL-MCNC: 0.2 MG/DL (ref 0.2–1.1)
BUN SERPL-MCNC: 19 MG/DL (ref 8–23)
CALCIUM SERPL-MCNC: 9.3 MG/DL (ref 8.3–10.4)
CHLORIDE SERPL-SCNC: 109 MMOL/L (ref 103–113)
CHOLEST SERPL-MCNC: 119 MG/DL
CO2 SERPL-SCNC: 31 MMOL/L (ref 21–32)
CREAT SERPL-MCNC: 0.9 MG/DL (ref 0.6–1)
DIFFERENTIAL METHOD BLD: ABNORMAL
EOSINOPHIL # BLD: 0.3 K/UL (ref 0–0.8)
EOSINOPHIL NFR BLD: 3 % (ref 0.5–7.8)
ERYTHROCYTE [DISTWIDTH] IN BLOOD BY AUTOMATED COUNT: 17.2 % (ref 11.9–14.6)
GLOBULIN SER CALC-MCNC: 3.7 G/DL (ref 2.8–4.5)
GLUCOSE SERPL-MCNC: 110 MG/DL (ref 65–100)
HCT VFR BLD AUTO: 41.1 % (ref 35.8–46.3)
HDLC SERPL-MCNC: 61 MG/DL (ref 40–60)
HDLC SERPL: 2
HGB BLD-MCNC: 12.3 G/DL (ref 11.7–15.4)
IMM GRANULOCYTES # BLD AUTO: 0 K/UL (ref 0–0.5)
IMM GRANULOCYTES NFR BLD AUTO: 1 % (ref 0–5)
LDLC SERPL CALC-MCNC: 38.2 MG/DL
LYMPHOCYTES # BLD: 2.1 K/UL (ref 0.5–4.6)
LYMPHOCYTES NFR BLD: 25 % (ref 13–44)
MCH RBC QN AUTO: 24.7 PG (ref 26.1–32.9)
MCHC RBC AUTO-ENTMCNC: 29.9 G/DL (ref 31.4–35)
MCV RBC AUTO: 82.7 FL (ref 82–102)
MONOCYTES # BLD: 0.4 K/UL (ref 0.1–1.3)
MONOCYTES NFR BLD: 5 % (ref 4–12)
NEUTS SEG # BLD: 5.6 K/UL (ref 1.7–8.2)
NEUTS SEG NFR BLD: 66 % (ref 43–78)
NRBC # BLD: 0 K/UL (ref 0–0.2)
PLATELET # BLD AUTO: 255 K/UL (ref 150–450)
PMV BLD AUTO: 10.5 FL (ref 9.4–12.3)
POTASSIUM SERPL-SCNC: 4.2 MMOL/L (ref 3.5–5.1)
PROT SERPL-MCNC: 7.5 G/DL (ref 6.3–8.2)
RBC # BLD AUTO: 4.97 M/UL (ref 4.05–5.2)
SODIUM SERPL-SCNC: 142 MMOL/L (ref 136–146)
TRIGL SERPL-MCNC: 99 MG/DL (ref 35–150)
VIT B12 SERPL-MCNC: 218 PG/ML (ref 193–986)
VLDLC SERPL CALC-MCNC: 19.8 MG/DL (ref 6–23)
WBC # BLD AUTO: 8.5 K/UL (ref 4.3–11.1)

## 2024-02-08 PROCEDURE — 99215 OFFICE O/P EST HI 40 MIN: CPT | Performed by: FAMILY MEDICINE

## 2024-02-08 PROCEDURE — 3079F DIAST BP 80-89 MM HG: CPT | Performed by: FAMILY MEDICINE

## 2024-02-08 PROCEDURE — 1123F ACP DISCUSS/DSCN MKR DOCD: CPT | Performed by: FAMILY MEDICINE

## 2024-02-08 PROCEDURE — 3075F SYST BP GE 130 - 139MM HG: CPT | Performed by: FAMILY MEDICINE

## 2024-02-08 RX ORDER — ROSUVASTATIN CALCIUM 20 MG/1
20 TABLET, COATED ORAL DAILY
Qty: 90 TABLET | Refills: 1 | Status: SHIPPED | OUTPATIENT
Start: 2024-02-08

## 2024-02-08 RX ORDER — FLUTICASONE PROPIONATE AND SALMETEROL XINAFOATE 230; 21 UG/1; UG/1
2 AEROSOL, METERED RESPIRATORY (INHALATION) 2 TIMES DAILY
Qty: 1 EACH | Refills: 5 | Status: SHIPPED | OUTPATIENT
Start: 2024-02-08

## 2024-02-08 RX ORDER — LOSARTAN POTASSIUM 100 MG/1
100 TABLET ORAL DAILY
Qty: 90 TABLET | Refills: 1 | Status: SHIPPED | OUTPATIENT
Start: 2024-02-08 | End: 2024-08-06

## 2024-02-08 RX ORDER — ROPINIROLE 3 MG/1
3 TABLET, FILM COATED ORAL 2 TIMES DAILY
Qty: 180 TABLET | Refills: 1 | Status: SHIPPED | OUTPATIENT
Start: 2024-02-08

## 2024-02-08 RX ORDER — ALBUTEROL SULFATE 90 UG/1
2 AEROSOL, METERED RESPIRATORY (INHALATION) EVERY 6 HOURS PRN
Qty: 18 G | Refills: 1 | Status: SHIPPED | OUTPATIENT
Start: 2024-02-08

## 2024-02-08 RX ORDER — GABAPENTIN 400 MG/1
CAPSULE ORAL
Qty: 360 CAPSULE | Refills: 1 | Status: SHIPPED | OUTPATIENT
Start: 2024-02-08 | End: 2024-03-10

## 2024-02-08 RX ORDER — FAMOTIDINE 40 MG/1
40 TABLET, FILM COATED ORAL 2 TIMES DAILY
Qty: 180 TABLET | Refills: 1 | Status: CANCELLED | OUTPATIENT
Start: 2024-02-08

## 2024-02-08 RX ORDER — TOLTERODINE TARTRATE 2 MG/1
2 TABLET, EXTENDED RELEASE ORAL 2 TIMES DAILY
Qty: 180 TABLET | Refills: 1 | Status: SHIPPED | OUTPATIENT
Start: 2024-02-08

## 2024-02-08 RX ORDER — VENLAFAXINE HYDROCHLORIDE 75 MG/1
CAPSULE, EXTENDED RELEASE ORAL
Qty: 270 CAPSULE | Refills: 1 | Status: SHIPPED | OUTPATIENT
Start: 2024-02-08

## 2024-02-08 ASSESSMENT — ENCOUNTER SYMPTOMS
COUGH: 0
VOMITING: 0
SINUS PAIN: 0
PHOTOPHOBIA: 0
SINUS PRESSURE: 0

## 2024-02-08 ASSESSMENT — PATIENT HEALTH QUESTIONNAIRE - PHQ9
SUM OF ALL RESPONSES TO PHQ QUESTIONS 1-9: 0
2. FEELING DOWN, DEPRESSED OR HOPELESS: 0
1. LITTLE INTEREST OR PLEASURE IN DOING THINGS: 0
SUM OF ALL RESPONSES TO PHQ9 QUESTIONS 1 & 2: 0
SUM OF ALL RESPONSES TO PHQ QUESTIONS 1-9: 0

## 2024-02-08 NOTE — PROGRESS NOTES
night- symptoms are rare when she is taking both medications. She reports soreness in the mid upper abdomen, rarely feels acid coming up into her throat; denies gas, bloating, blood in stool or dark black stools. She has 1 cup of coffee, cut back her Diet Pepsi sodas to 2 cans a day, no tea or chocolate; tomato sauces trouble her- tries to avoid, meats make her sick. She was seeing Dr Mckinney, GI, many years ago.]    Hypertension, Hyperlipidemia, Morbid Obesity- says BP are good- checks every day, avoids salt, better with avoiding fatty foods- prepares her meals, eats a lot of salads, eats mostly vegetarian. She takes Losartan 100 mg in am and Crestor 20 mg after dinner. Her last eye exam was 2 weeks ago at [facility], stopped Plaquenil. She has gained about 7 lbs in the last 6 months and 9 lbs in the last 1 year. Says her  has been very sick, so she has been less active; says Silver Sneakers was canceled.     She was diagnosed with localized colon cancer- had 4 inches of the colon removed- needs to repeat another colonoscopy.     Review of Systems   Constitutional:  Positive for fatigue. Negative for chills and fever.   HENT:  Negative for congestion, ear pain, postnasal drip, sinus pressure, sinus pain and sore throat.    Eyes:  Negative for photophobia and visual disturbance.   Respiratory:  Positive for apnea. Negative for cough, shortness of breath and wheezing.    Cardiovascular:  Negative for chest pain, palpitations and leg swelling.   Gastrointestinal:  Positive for nausea. Negative for abdominal pain, anal bleeding, blood in stool, diarrhea and vomiting.   Genitourinary:  Positive for enuresis and urgency. Negative for difficulty urinating, dysuria, flank pain, frequency, hematuria and pelvic pain.   Musculoskeletal:  Negative for gait problem.   Neurological:  Positive for tremors and numbness. Negative for weakness, light-headedness and headaches.   Psychiatric/Behavioral:  Positive for sleep

## 2024-02-22 DIAGNOSIS — E78.2 HYPERLIPEMIA, MIXED: ICD-10-CM

## 2024-02-22 RX ORDER — ROSUVASTATIN CALCIUM 20 MG/1
TABLET, COATED ORAL
Qty: 100 TABLET | Refills: 1 | OUTPATIENT
Start: 2024-02-22

## 2024-04-11 ENCOUNTER — OFFICE VISIT (OUTPATIENT)
Dept: SLEEP MEDICINE | Age: 69
End: 2024-04-11
Payer: MEDICARE

## 2024-04-11 VITALS
OXYGEN SATURATION: 92 % | TEMPERATURE: 97.9 F | DIASTOLIC BLOOD PRESSURE: 70 MMHG | WEIGHT: 259.4 LBS | HEIGHT: 67 IN | HEART RATE: 86 BPM | SYSTOLIC BLOOD PRESSURE: 122 MMHG | RESPIRATION RATE: 16 BRPM | BODY MASS INDEX: 40.71 KG/M2

## 2024-04-11 DIAGNOSIS — G25.81 RESTLESS LEG SYNDROME: ICD-10-CM

## 2024-04-11 DIAGNOSIS — F51.04 PSYCHOPHYSIOLOGICAL INSOMNIA: ICD-10-CM

## 2024-04-11 DIAGNOSIS — R53.83 OTHER FATIGUE: ICD-10-CM

## 2024-04-11 DIAGNOSIS — G47.33 OBSTRUCTIVE SLEEP APNEA SYNDROME: Primary | ICD-10-CM

## 2024-04-11 PROCEDURE — 1123F ACP DISCUSS/DSCN MKR DOCD: CPT | Performed by: INTERNAL MEDICINE

## 2024-04-11 PROCEDURE — 3074F SYST BP LT 130 MM HG: CPT | Performed by: INTERNAL MEDICINE

## 2024-04-11 PROCEDURE — 99215 OFFICE O/P EST HI 40 MIN: CPT | Performed by: INTERNAL MEDICINE

## 2024-04-11 PROCEDURE — 3078F DIAST BP <80 MM HG: CPT | Performed by: INTERNAL MEDICINE

## 2024-04-11 ASSESSMENT — SLEEP AND FATIGUE QUESTIONNAIRES
HOW LIKELY ARE YOU TO NOD OFF OR FALL ASLEEP WHILE SITTING AND READING: WOULD NEVER DOZE
HOW LIKELY ARE YOU TO NOD OFF OR FALL ASLEEP WHILE LYING DOWN TO REST IN THE AFTERNOON WHEN CIRCUMSTANCES PERMIT: HIGH CHANCE OF DOZING
HOW LIKELY ARE YOU TO NOD OFF OR FALL ASLEEP WHEN YOU ARE A PASSENGER IN A CAR FOR AN HOUR WITHOUT A BREAK: WOULD NEVER DOZE
HOW LIKELY ARE YOU TO NOD OFF OR FALL ASLEEP WHILE SITTING AND TALKING TO SOMEONE: WOULD NEVER DOZE
HOW LIKELY ARE YOU TO NOD OFF OR FALL ASLEEP WHILE SITTING QUIETLY AFTER LUNCH WITHOUT ALCOHOL: SLIGHT CHANCE OF DOZING
HOW LIKELY ARE YOU TO NOD OFF OR FALL ASLEEP IN A CAR, WHILE STOPPED FOR A FEW MINUTES IN TRAFFIC: WOULD NEVER DOZE
HOW LIKELY ARE YOU TO NOD OFF OR FALL ASLEEP WHILE SITTING INACTIVE IN A PUBLIC PLACE: SLIGHT CHANCE OF DOZING
ESS TOTAL SCORE: 5
HOW LIKELY ARE YOU TO NOD OFF OR FALL ASLEEP WHILE WATCHING TV: WOULD NEVER DOZE

## 2024-04-11 NOTE — ASSESSMENT & PLAN NOTE
Patient has chronic insomnia, etiology unclear although I suspect psychophysiologic insomnia with poor sleep hygiene.  A 4-week sleep log will be completed to evaluate sleep patterns over time and guide therapy.  We did discuss sleep hygiene as well as hypnotic therapy in general.  Specific behavioral measures given today that patient can work on between now and next visit.  -No electronics 1 hour before bed or in bed  -Avoid naps if you have to take a nap you CPAP and make it less than 30 minutes before 1 PM  -Stop caffeine use after 1 PM  -Educational handout given on sleep hygiene.

## 2024-04-11 NOTE — ASSESSMENT & PLAN NOTE
Patient has clinical symptoms of restless leg syndrome, and this is  affecting her  ability to sleep.  We will obtain blood work today for review at next visit, iron panel, ferritin, vitamin D, magnesium, renal function (if not done recently).  Restless leg syndrome symptomatology explained as well as educational handout given.

## 2024-04-11 NOTE — ASSESSMENT & PLAN NOTE
Patient has moderate obstructive sleep apnea, unfortunately she has not really received much benefit from CPAP but technically is compliant.  We discussed he really needs to use it for 6 hours or more on a regular basis sometimes before she will see benefits.  This is complicated by her insomnia that has not improved on CPAP.  We will increase her pressure slightly as her AHI is elevated at times over 5 with an average of 3.6.  Done in office today APAP 9-16 cm H2O.  We will increase her ramp a bit to 7 cm H2O for her air hunger, she is to let me know if this is not better.      If we are not making progress at next visit we may consider an in lab titration study as she has never gone through this before.  We will check a download in about 1 month just to make sure things have not gotten worse on new settings.

## 2024-04-11 NOTE — PATIENT INSTRUCTIONS
keep a list of the medicines you take.  How can you care for yourself at home?  Cognitive behavioral therapy for insomnia (CBT-I)  If your doctor recommends CBT-I, follow your treatment plan. Your doctor will give you instructions that are unique for you.  Your plan will likely include a few things that you can try at home. For example:  Try meditation or other relaxation techniques before you go to bed.  Go to bed at the same time every night, and wake up at the same time every morning. Do not take naps during the day.  Do not stay in bed awake for too long. If you can't fall asleep, or if you wake up in the middle of the night and can't get back to sleep within about 15 to 20 minutes, get out of bed and go to another room until you feel sleepy.  If watching the clock makes you anxious, turn it facing away from you so you cannot see the time.  If you worry when you lie down, start a worry book. Well before bedtime, write down your worries, and then set the book and your concerns aside.  Healthy sleep habits  If your doctor recommends it, try making healthy sleep habits. For example:  Keep your bedroom quiet, dark, and cool.  Do not have drinks with caffeine, such as coffee or black tea, for 8 hours before bed.  Do not smoke or use other types of tobacco near bedtime. Nicotine is a stimulant and can keep you awake.  Avoid drinking alcohol late in the evening, because it can cause you to wake in the middle of the night.  Do not eat a big meal close to bedtime. If you are hungry, eat a light snack.  Do not drink a lot of water close to bedtime, because the need to urinate may wake you up during the night.  Do not read, watch TV, or use your phone in bed. Use the bed only for sleeping and sex.  Medicine  Be safe with medicines. Take your medicines exactly as prescribed. Call your doctor if you think you are having a problem with your medicine.  Talk with your doctor before you try an over-the-counter medicine, herbal

## 2024-05-05 DIAGNOSIS — G25.81 RESTLESS LEG SYNDROME: ICD-10-CM

## 2024-05-06 RX ORDER — ROPINIROLE 3 MG/1
3 TABLET, FILM COATED ORAL 2 TIMES DAILY
Qty: 180 TABLET | Refills: 0 | OUTPATIENT
Start: 2024-05-06

## 2024-05-09 ENCOUNTER — TELEPHONE (OUTPATIENT)
Dept: SLEEP MEDICINE | Age: 69
End: 2024-05-09

## 2024-05-09 DIAGNOSIS — R53.83 OTHER FATIGUE: Primary | ICD-10-CM

## 2024-05-09 DIAGNOSIS — G25.81 RESTLESS LEGS SYNDROME (RLS): ICD-10-CM

## 2024-05-09 NOTE — TELEPHONE ENCOUNTER
----- Message from Jennifer Banks III, MD sent at 4/11/2024  1:23 PM EDT -----  Pls check download on 9-16 cm H20, ramped increased to 7cm

## 2024-05-15 SDOH — ECONOMIC STABILITY: FOOD INSECURITY: WITHIN THE PAST 12 MONTHS, YOU WORRIED THAT YOUR FOOD WOULD RUN OUT BEFORE YOU GOT MONEY TO BUY MORE.: NEVER TRUE

## 2024-05-15 SDOH — ECONOMIC STABILITY: FOOD INSECURITY: WITHIN THE PAST 12 MONTHS, THE FOOD YOU BOUGHT JUST DIDN'T LAST AND YOU DIDN'T HAVE MONEY TO GET MORE.: NEVER TRUE

## 2024-05-15 SDOH — ECONOMIC STABILITY: INCOME INSECURITY: HOW HARD IS IT FOR YOU TO PAY FOR THE VERY BASICS LIKE FOOD, HOUSING, MEDICAL CARE, AND HEATING?: NOT VERY HARD

## 2024-05-15 SDOH — ECONOMIC STABILITY: TRANSPORTATION INSECURITY
IN THE PAST 12 MONTHS, HAS LACK OF TRANSPORTATION KEPT YOU FROM MEETINGS, WORK, OR FROM GETTING THINGS NEEDED FOR DAILY LIVING?: NO

## 2024-05-15 ASSESSMENT — PATIENT HEALTH QUESTIONNAIRE - PHQ9
1. LITTLE INTEREST OR PLEASURE IN DOING THINGS: NOT AT ALL
8. MOVING OR SPEAKING SO SLOWLY THAT OTHER PEOPLE COULD HAVE NOTICED. OR THE OPPOSITE, BEING SO FIGETY OR RESTLESS THAT YOU HAVE BEEN MOVING AROUND A LOT MORE THAN USUAL: NOT AT ALL
9. THOUGHTS THAT YOU WOULD BE BETTER OFF DEAD, OR OF HURTING YOURSELF: NOT AT ALL
SUM OF ALL RESPONSES TO PHQ QUESTIONS 1-9: 2
4. FEELING TIRED OR HAVING LITTLE ENERGY: NOT AT ALL
SUM OF ALL RESPONSES TO PHQ QUESTIONS 1-9: 2
6. FEELING BAD ABOUT YOURSELF - OR THAT YOU ARE A FAILURE OR HAVE LET YOURSELF OR YOUR FAMILY DOWN: SEVERAL DAYS
8. MOVING OR SPEAKING SO SLOWLY THAT OTHER PEOPLE COULD HAVE NOTICED. OR THE OPPOSITE - BEING SO FIDGETY OR RESTLESS THAT YOU HAVE BEEN MOVING AROUND A LOT MORE THAN USUAL: NOT AT ALL
10. IF YOU CHECKED OFF ANY PROBLEMS, HOW DIFFICULT HAVE THESE PROBLEMS MADE IT FOR YOU TO DO YOUR WORK, TAKE CARE OF THINGS AT HOME, OR GET ALONG WITH OTHER PEOPLE: NOT DIFFICULT AT ALL
10. IF YOU CHECKED OFF ANY PROBLEMS, HOW DIFFICULT HAVE THESE PROBLEMS MADE IT FOR YOU TO DO YOUR WORK, TAKE CARE OF THINGS AT HOME, OR GET ALONG WITH OTHER PEOPLE: NOT DIFFICULT AT ALL
7. TROUBLE CONCENTRATING ON THINGS, SUCH AS READING THE NEWSPAPER OR WATCHING TELEVISION: NOT AT ALL
SUM OF ALL RESPONSES TO PHQ QUESTIONS 1-9: 2
4. FEELING TIRED OR HAVING LITTLE ENERGY: NOT AT ALL
SUM OF ALL RESPONSES TO PHQ QUESTIONS 1-9: 2
6. FEELING BAD ABOUT YOURSELF - OR THAT YOU ARE A FAILURE OR HAVE LET YOURSELF OR YOUR FAMILY DOWN: SEVERAL DAYS
SUM OF ALL RESPONSES TO PHQ QUESTIONS 1-9: 2
2. FEELING DOWN, DEPRESSED OR HOPELESS: NOT AT ALL
2. FEELING DOWN, DEPRESSED OR HOPELESS: NOT AT ALL
1. LITTLE INTEREST OR PLEASURE IN DOING THINGS: NOT AT ALL
3. TROUBLE FALLING OR STAYING ASLEEP: SEVERAL DAYS
3. TROUBLE FALLING OR STAYING ASLEEP: SEVERAL DAYS
5. POOR APPETITE OR OVEREATING: NOT AT ALL
7. TROUBLE CONCENTRATING ON THINGS, SUCH AS READING THE NEWSPAPER OR WATCHING TELEVISION: NOT AT ALL
9. THOUGHTS THAT YOU WOULD BE BETTER OFF DEAD, OR OF HURTING YOURSELF: NOT AT ALL
5. POOR APPETITE OR OVEREATING: NOT AT ALL
SUM OF ALL RESPONSES TO PHQ9 QUESTIONS 1 & 2: 0

## 2024-05-16 ENCOUNTER — OFFICE VISIT (OUTPATIENT)
Dept: FAMILY MEDICINE CLINIC | Facility: CLINIC | Age: 69
End: 2024-05-16
Payer: MEDICARE

## 2024-05-16 VITALS
BODY MASS INDEX: 40.79 KG/M2 | RESPIRATION RATE: 16 BRPM | DIASTOLIC BLOOD PRESSURE: 80 MMHG | OXYGEN SATURATION: 96 % | TEMPERATURE: 97.3 F | HEART RATE: 80 BPM | WEIGHT: 259.9 LBS | HEIGHT: 67 IN | SYSTOLIC BLOOD PRESSURE: 128 MMHG

## 2024-05-16 DIAGNOSIS — Z12.31 ENCOUNTER FOR SCREENING MAMMOGRAM FOR MALIGNANT NEOPLASM OF BREAST: Chronic | ICD-10-CM

## 2024-05-16 DIAGNOSIS — Z78.0 POSTMENOPAUSAL: Chronic | ICD-10-CM

## 2024-05-16 DIAGNOSIS — Z00.00 MEDICARE ANNUAL WELLNESS VISIT, SUBSEQUENT: Primary | Chronic | ICD-10-CM

## 2024-05-16 PROCEDURE — 3079F DIAST BP 80-89 MM HG: CPT | Performed by: FAMILY MEDICINE

## 2024-05-16 PROCEDURE — 3074F SYST BP LT 130 MM HG: CPT | Performed by: FAMILY MEDICINE

## 2024-05-16 PROCEDURE — G0439 PPPS, SUBSEQ VISIT: HCPCS | Performed by: FAMILY MEDICINE

## 2024-05-16 PROCEDURE — 1123F ACP DISCUSS/DSCN MKR DOCD: CPT | Performed by: FAMILY MEDICINE

## 2024-05-16 ASSESSMENT — PATIENT HEALTH QUESTIONNAIRE - PHQ9
9. THOUGHTS THAT YOU WOULD BE BETTER OFF DEAD, OR OF HURTING YOURSELF: NOT AT ALL
4. FEELING TIRED OR HAVING LITTLE ENERGY: NOT AT ALL
10. IF YOU CHECKED OFF ANY PROBLEMS, HOW DIFFICULT HAVE THESE PROBLEMS MADE IT FOR YOU TO DO YOUR WORK, TAKE CARE OF THINGS AT HOME, OR GET ALONG WITH OTHER PEOPLE: NOT DIFFICULT AT ALL
8. MOVING OR SPEAKING SO SLOWLY THAT OTHER PEOPLE COULD HAVE NOTICED. OR THE OPPOSITE, BEING SO FIGETY OR RESTLESS THAT YOU HAVE BEEN MOVING AROUND A LOT MORE THAN USUAL: NOT AT ALL
3. TROUBLE FALLING OR STAYING ASLEEP: NOT AT ALL
SUM OF ALL RESPONSES TO PHQ9 QUESTIONS 1 & 2: 0
7. TROUBLE CONCENTRATING ON THINGS, SUCH AS READING THE NEWSPAPER OR WATCHING TELEVISION: NOT AT ALL
SUM OF ALL RESPONSES TO PHQ QUESTIONS 1-9: 0
6. FEELING BAD ABOUT YOURSELF - OR THAT YOU ARE A FAILURE OR HAVE LET YOURSELF OR YOUR FAMILY DOWN: NOT AT ALL
SUM OF ALL RESPONSES TO PHQ QUESTIONS 1-9: 0
SUM OF ALL RESPONSES TO PHQ QUESTIONS 1-9: 0
1. LITTLE INTEREST OR PLEASURE IN DOING THINGS: NOT AT ALL
2. FEELING DOWN, DEPRESSED OR HOPELESS: NOT AT ALL
5. POOR APPETITE OR OVEREATING: NOT AT ALL
SUM OF ALL RESPONSES TO PHQ QUESTIONS 1-9: 0

## 2024-05-16 ASSESSMENT — LIFESTYLE VARIABLES
HOW OFTEN DO YOU HAVE A DRINK CONTAINING ALCOHOL: NEVER
HOW MANY STANDARD DRINKS CONTAINING ALCOHOL DO YOU HAVE ON A TYPICAL DAY: PATIENT DOES NOT DRINK

## 2024-05-16 NOTE — PATIENT INSTRUCTIONS
that may run in your family, and various assessments and screenings as appropriate.    After reviewing your medical record and screening and assessments performed today your provider may have ordered immunizations, labs, imaging, and/or referrals for you.  A list of these orders (if applicable) as well as your Preventive Care list are included within your After Visit Summary for your review.    Other Preventive Recommendations:    A preventive eye exam performed by an eye specialist is recommended every 1-2 years to screen for glaucoma; cataracts, macular degeneration, and other eye disorders.  A preventive dental visit is recommended every 6 months.  Try to get at least 150 minutes of exercise per week or 10,000 steps per day on a pedometer .  Order or download the FREE \"Exercise & Physical Activity: Your Everyday Guide\" from [employer]. Call 1-901.911.1797 or search [employer] online.  You need 9521-9414 mg of calcium and 5996-6604 IU of vitamin D per day. It is possible to meet your calcium requirement with diet alone, but a vitamin D supplement is usually necessary to meet this goal.  When exposed to the sun, use a sunscreen that protects against both UVA and UVB radiation with an SPF of 30 or greater. Reapply every 2 to 3 hours or after sweating, drying off with a towel, or swimming.  Always wear a seat belt when traveling in a car. Always wear a helmet when riding a bicycle or motorcycle.

## 2024-05-16 NOTE — PROGRESS NOTES
Medicare Annual Wellness Visit    Saskia Art is here for Medicare AWV    Assessment & Plan   Medicare annual wellness visit, subsequent  Encounter for screening mammogram for malignant neoplasm of breast  -     MARIAN DIGITAL SCREEN W OR WO CAD BILATERAL; Future  Postmenopausal  -     DEXA BONE DENSITY AXIAL SKELETON; Future  Recommendations for Preventive Services Due: see orders and patient instructions/AVS.  Recommended screening schedule for the next 5-10 years is provided to the patient in written form: see Patient Instructions/AVS.     Patient does have a living will, advance directive and a healthcare POA. Advised patient to bring a copy of the same to their chart - patient understands and agrees.  Her last colonoscopy was on 5/1/2024- found 1 benign polyp- cleared for 3 years; was diagnosed with adenocarcinoma in her transverse colon in 2023- sees by Dr Dahl. She denies any GI symptoms except constipation, nausea and heartburn. Her Mother had some kind of abdominal cancer- unsure if it was colon cancer but thinks she had part of her small intestine and colon taken out.  She denies any vaginal bleeding, spotting or discharge.  Her last mammogram was on 9/7/23- was normal- goes to [facility]- gets a 3D mammogram; denies any breast concerns; her Mother had breast cancer around age 70 and all of her sisters (7 of them) had breast cancer.  Her last DEXA scan was on 5/31/22- reordered again today- goes to [facility].  Vaccines- she took the Flu vaccine on 10/28/23- good about taking it every year in the fall, took Pneumovax 23 on 10/22/2019 and Prevnar 13 on 2/1/2016- she will need the Prevnar 20 vaccine after 10/23/2024.  She took the Tdap vaccine on 11/13/2019; Shingrix vaccine on 11/13/2019, 6/13/22; RSV on 12/24/23. She took the COVID-19 vaccines on 3/9/2021, 3/30/2021, 10/26/2021, 6/13/22, 10/28/23- advised to take another booster in the fall.    She says she is taking Prevagen which helps

## 2024-05-16 NOTE — ACP (ADVANCE CARE PLANNING)
Date of ACP Conversation: 5/16/24  Persons included in Conversation: Patient  Length of ACP Conversation in minutes: 5 minutes    Authorized Decision Maker (if patient is incapable of making informed decisions): NA          For Patients with Decision Making Capacity:   Patient does have a Living Will, Advance Directives and Healthcare Power of .      Conversation Outcomes / Follow-Up Plan:   Patient does have a living will, advance directive and a healthcare POA. Advised patient to bring a copy of the same to their chart - patient understands and agrees.

## 2024-05-23 ENCOUNTER — OFFICE VISIT (OUTPATIENT)
Dept: UROGYNECOLOGY | Age: 69
End: 2024-05-23
Payer: MEDICARE

## 2024-05-23 VITALS — HEIGHT: 67 IN | WEIGHT: 260 LBS | BODY MASS INDEX: 40.81 KG/M2

## 2024-05-23 DIAGNOSIS — G47.33 OBSTRUCTIVE SLEEP APNEA SYNDROME: ICD-10-CM

## 2024-05-23 DIAGNOSIS — N81.89 WEAKNESS OF PELVIC FLOOR: ICD-10-CM

## 2024-05-23 DIAGNOSIS — E11.40 TYPE 2 DIABETES MELLITUS WITH DIABETIC NEUROPATHY, UNSPECIFIED WHETHER LONG TERM INSULIN USE (HCC): ICD-10-CM

## 2024-05-23 DIAGNOSIS — N39.41 URGE INCONTINENCE: Primary | ICD-10-CM

## 2024-05-23 LAB
BILIRUBIN, URINE, POC: NEGATIVE
BLOOD URINE, POC: NEGATIVE
GLUCOSE URINE, POC: NEGATIVE
KETONES, URINE, POC: NEGATIVE
LEUKOCYTE ESTERASE, URINE, POC: NEGATIVE
NITRITE, URINE, POC: NEGATIVE
PH, URINE, POC: 5.5 (ref 4.6–8)
PROTEIN,URINE, POC: NEGATIVE
SPECIFIC GRAVITY, URINE, POC: <=1.005 (ref 1–1.03)
URINALYSIS CLARITY, POC: CLEAR
URINALYSIS COLOR, POC: YELLOW
UROBILINOGEN, POC: NORMAL

## 2024-05-23 PROCEDURE — 51701 INSERT BLADDER CATHETER: CPT | Performed by: OBSTETRICS & GYNECOLOGY

## 2024-05-23 PROCEDURE — 99459 PELVIC EXAMINATION: CPT | Performed by: OBSTETRICS & GYNECOLOGY

## 2024-05-23 PROCEDURE — 1123F ACP DISCUSS/DSCN MKR DOCD: CPT | Performed by: OBSTETRICS & GYNECOLOGY

## 2024-05-23 PROCEDURE — 99204 OFFICE O/P NEW MOD 45 MIN: CPT | Performed by: OBSTETRICS & GYNECOLOGY

## 2024-05-23 PROCEDURE — 81002 URINALYSIS NONAUTO W/O SCOPE: CPT | Performed by: OBSTETRICS & GYNECOLOGY

## 2024-05-23 NOTE — ASSESSMENT & PLAN NOTE
I have reviewed Saskia Art diagnosis of DM. We have reviewed her most recent HgA1C. We discussed the role of uncontrolled DM in her urinary symptoms including but not limited to neuropathy, infections, poor wound healing. We discussed the importance of controlling her blood sugars in helping with symptoms treatment.     Hemoglobin A1C   Date Value Ref Range Status   06/06/2023 7.7 % Final

## 2024-05-23 NOTE — PATIENT INSTRUCTIONS
Please eliminate bladder irritants. This includes caffeine, artificial sweeteners, carbonated beverages, alcohol, tomato based products, citrus and spicy foods.  She needs to stop diet pepsi or diet ginger ale  She is drinking 1 cup of coffee   I highly recommend pelvic floor physical therapy.   DM - she needs to better control her DM.   Sleep Apnea- cont with CPAP  Needs to lose weight. BMI 40  Detrol- we reviewed the risks of dry mouth, dry eyes, constipation and an association with memory loss. If this is not working for her, I advise that she stop.

## 2024-05-23 NOTE — ASSESSMENT & PLAN NOTE
We discussed the purpose of physical therapy which is to strengthen the pelvic floor muscles and teach proper coordination of those muscles. I described the anatomy of those muscles involved and their relationship to the end-organs in the pelvis. I described therapy techniques which include a combination of therapeutic exercise, biofeedback, neuromuscular re-education, home programs, and electrical stimulation, as well as therapeutic massage and ultrasound for pain.    I recommend Physical therapy and will send a referral.

## 2024-05-23 NOTE — ASSESSMENT & PLAN NOTE
We discuss how sleep apnea may be related to the underlying voiding problem. Uncontrolled sleep apnea may cause nocturnal polyuria (increase in urine production at night) by release of atrial natriuretic peptide (ANP) due to negative intrathoracic pressure and stretching of the myocardium This results in increased sodium and water excretion. CPAP can result in significant reductions in ANP levels and in nocturia episodes.

## 2024-05-23 NOTE — ASSESSMENT & PLAN NOTE
We discussed the role that weight and BMI have on bladder function.  Research has shown that 8% weight loss can reduce incontinence rates by 50%.       We discussed that she needs to do weight bearing exercises and increase her lean protein intake.

## 2024-05-23 NOTE — ASSESSMENT & PLAN NOTE
We discussed the differential diagnosis of urinary urgency/ urge incontinence. She is aware that women leak urine for many different reasons. We discussed the epidemiology, pathogenesis, and etiology of bladder overactivity including the neurophysiologic axis.  We also discussed the treatment pathway for UUI/OAB. I offered options which include nothing, dietary modifications, medications, physical therapy, behavior modification, botox injections and neuromodulation.      My recommendations:  Please eliminate bladder irritants. This includes caffeine, artificial sweeteners, carbonated beverages, alcohol, tomato based products, citrus and spicy foods.  She needs to stop diet pepsi or diet ginger ale  She is drinking 1 cup of coffee   I highly recommend pelvic floor physical therapy.   DM - she needs to better control her DM.   Sleep Apnea- cont with CPAP  Needs to lose weight. BMI 40  Detrol- we reviewed the risks of dry mouth, dry eyes, constipation and an association with memory loss. If this is not working for her, I advise that she stop.     She is interested in PT.     We briefly discussed 2nd and 3rd line therapy but she has to get better control of her DM, and weight

## 2024-05-23 NOTE — PROGRESS NOTES
[facility] UROGYNECOLOGY  [address]  [address]  [place] [facility] [place] [address]  Dept: [phone]        PCP:  Lillian Huang MD    5/23/2024    HPI:  I am being asked to see this patient in consultation by Dr. Huang   for New Patient  .     Ms. Saskia Art  has been leaking urine for 6 months or more. She leaks urine both daytime/ night time/ both. She does not leak urine with cough, laugh, sneeze and activity. She does leak urine with urgency. She uses 1  medium  pad a day. She leaks 1 times per day. When she leaks she leaks large amounts. She has not tried PT, she has tried medication Oxybutynin. She has not had procedures/surgery for this in the past.     Urge incontinence bothers her the most.       Ms. Saskia Art has been having pelvic pain for 6 months. The pain is primarily located vagina area mostly in the back. The pain is worse when urinating. The pain is better when when bladder is empty. She has not tried PT, she has not tried medication . She has not had procedures/surgery for this in the past.     She voids 4 times during the day.  She voids 3 times over night.  She has 0-2 BM per week, and does not strain.    She drinks 4-5 caffeine drinks beverages per day.  She uses 3 diet sodas artificial sweeteners per day.  She drinks 0 alcoholic beverages per week.     She has pelvic surgery in the past. Hysterectomy with BSO in 1990  Her last PAP: 07/11/2020    Her last Colonoscopy: 05/01/2024  Her last Mammogram: 09/07/2023    She does have a history of DM. 8.2% 3/06/2024    She does have a history of sleep apnea. CPAP     Tobacco: No    Sexual History: not sexually active.    Notes were reviewed from the referring provider Dr Huang.  Results were reviewed from prior tests:     Results for orders placed or performed in visit on 05/23/24   AMB POC URINALYSIS DIP STICK MANUAL W/O MICRO   Result Value Ref Range    Color (UA POC) Yellow     Clarity (UA POC) Clear     Glucose, Urine,

## 2024-05-30 NOTE — TELEPHONE ENCOUNTER
Download shows better control when using the actual use has decreased, I tried to call and leave a message but their mailbox has not been set up.  We were considering an in lab titration if we are not successful, I will send her a message via [employer].  RLS labs ordered last visit but not done and actually I do not even see the orders in the chart although clearly they were placed by my chart review, will reorder these and ask her to get them done as well.

## 2024-05-31 NOTE — TELEPHONE ENCOUNTER
Pt sent a [employer] message regarding labs.    My insurance woud not pay for labs. It was going to be over a hundred dollars. Can I ask what kind of problems you are seeing with cpap?? I don’t log alot of hours because I don’t sleep much

## 2024-06-15 PROBLEM — Z00.00 MEDICARE ANNUAL WELLNESS VISIT, SUBSEQUENT: Status: RESOLVED | Noted: 2020-12-17 | Resolved: 2024-06-15

## 2024-06-15 PROBLEM — Z12.31 ENCOUNTER FOR SCREENING MAMMOGRAM FOR MALIGNANT NEOPLASM OF BREAST: Status: RESOLVED | Noted: 2024-05-16 | Resolved: 2024-06-15

## 2024-06-19 ENCOUNTER — TELEPHONE (OUTPATIENT)
Dept: SLEEP MEDICINE | Age: 69
End: 2024-06-19

## 2024-06-19 NOTE — TELEPHONE ENCOUNTER
Patient will be going to [facility] tomorrow to have some tests and is asking to have her lab work that Dr. Banks wanted done there

## 2024-06-20 NOTE — TELEPHONE ENCOUNTER
Patient was wanting orders sent to [facility]; but decided to come up here to wenceslao her lab work done sent she would need to get a hand copy of the order. I did let her know we could attach it to [employer] and she would have to print it out.

## 2024-06-25 DIAGNOSIS — R53.83 OTHER FATIGUE: ICD-10-CM

## 2024-06-25 DIAGNOSIS — G25.81 RESTLESS LEGS SYNDROME (RLS): ICD-10-CM

## 2024-06-25 LAB
25(OH)D3 SERPL-MCNC: 26.7 NG/ML (ref 30–100)
FERRITIN SERPL-MCNC: 15 NG/ML (ref 8–388)
IRON SERPL-MCNC: 35 UG/DL (ref 35–100)
MAGNESIUM SERPL-MCNC: 1.6 MG/DL (ref 1.8–2.4)

## 2024-06-27 LAB — CRP SERPL-MCNC: 8 MG/L (ref 0–10)

## 2024-07-01 ENCOUNTER — HOSPITAL ENCOUNTER (OUTPATIENT)
Dept: PHYSICAL THERAPY | Age: 69
Setting detail: RECURRING SERIES
Discharge: HOME | End: 2024-07-04
Attending: OBSTETRICS & GYNECOLOGY
Payer: MEDICARE

## 2024-07-01 DIAGNOSIS — M62.81 MUSCLE WEAKNESS (GENERALIZED): ICD-10-CM

## 2024-07-01 DIAGNOSIS — R27.8 OTHER LACK OF COORDINATION: Primary | ICD-10-CM

## 2024-07-01 DIAGNOSIS — N39.41 URGE INCONTINENCE: ICD-10-CM

## 2024-07-01 PROCEDURE — 97161 PT EVAL LOW COMPLEX 20 MIN: CPT

## 2024-07-01 PROCEDURE — 97530 THERAPEUTIC ACTIVITIES: CPT

## 2024-07-01 ASSESSMENT — PAIN SCALES - GENERAL: PAINLEVEL_OUTOF10: 0

## 2024-07-01 NOTE — THERAPY EVALUATION
Charge Capture  Appt Desk  Attendance Report  Events        Future Appointments   Date Time Provider Department Center   7/11/2024  9:00 AM Sirisha Bermudez, PT [facility]   7/17/2024  9:00 AM Sirisha Bermudez, PT [facility]   7/24/2024  9:00 AM Sirisha Bermudez, PT [facility]   7/31/2024  9:15 AM Sirisha Bermudez, PT [facility]   8/7/2024  9:00 AM Sirisha Bermudez, PT [facility]   8/8/2024  8:10 AM Lillian Huang MD [facility]   8/14/2024  9:00 AM Sirisha Bermudez, PT [facility]   8/28/2024  9:15 AM Yumiko Agee DO [facility]   10/16/2024  9:00 AM Jennifer Banks III, MD [facility]

## 2024-07-01 NOTE — PROGRESS NOTES
Saskia Art  : 1955  Primary: [employer] Medicare-advantage [employer] (Medicare Managed)  Secondary:  [facility]  [address]   [address]  [place] [address]  Phone: [phone]  Fax: [phone] Plan Frequency: 1xweek/6 weeks    Plan of Care/Certification Expiration Date: 24        Plan of Care/Certification Expiration Date:  Plan of Care/Certification Expiration Date: 24    Frequency/Duration:   Plan Frequency: 1xweek/6 weeks      Time In/Out:   Time In: 1000  Time Out: 1050      PT Visit Info:         Visit Count:  1    OUTPATIENT PHYSICAL THERAPY:   Treatment Note 2024       Episode  (PFPT)               Treatment Diagnosis:    Other lack of coordination  Muscle weakness (generalized)  Urge incontinence  Medical/Referring Diagnosis:    Urge incontinence [N39.41]  Weakness of pelvic floor [N81.89]      Referring Physician:  Yumiko Agee DO MD Orders:  PT Eval and Treat   Return MD Appt:  24   Date of Onset:  Onset Date: 24     Allergies:   Insulin glargine, Buspirone, Codeine, Hydrocodone, Hydrocodone-acetaminophen, Liraglutide, Oxycodone, Tramadol, Trazodone, and Dulaglutide  Restrictions/Precautions:   None      Interventions Planned (Treatment may consist of any combination of the following):     See Assessment Note    Subjective Comments:   See Evaluation 24  Initial Pain Level::     0/10  Post Session Pain Level:       0/10  Medications Last Reviewed:  2024  Updated Objective Findings:  See Evaluation Note from today  Treatment   THERAPEUTIC EXERCISE: (0 minutes): Exercises per grid below to improve    Date:  24 Date:   Date:     Activity/Exercise Parameters Parameters Parameters   HEP None given this day                    THERAPEUTIC ACTIVITY: ( 15 minutes): Functional activity education regarding anatomy, pathology and role of pelvic floor muscle (PFM) function in relation to presenting symptoms and role of pelvic floor therapy in conservative

## 2024-07-11 ENCOUNTER — HOSPITAL ENCOUNTER (OUTPATIENT)
Dept: PHYSICAL THERAPY | Age: 69
Setting detail: RECURRING SERIES
Discharge: HOME | End: 2024-07-14
Attending: OBSTETRICS & GYNECOLOGY
Payer: MEDICARE

## 2024-07-11 PROCEDURE — 97110 THERAPEUTIC EXERCISES: CPT

## 2024-07-11 ASSESSMENT — PAIN SCALES - GENERAL: PAINLEVEL_OUTOF10: 6

## 2024-07-11 NOTE — PROGRESS NOTES
Saskia Zafar Art  : 1955  Primary: [employer] Medicare-advantage Ppo (Medicare Managed)  Secondary:  [facility]  [address]  [address]  [facility] [address]  Phone: [phone]  Fax: [phone] Plan Frequency: 1xweek/6 weeks    Plan of Care/Certification Expiration Date: 24        Plan of Care/Certification Expiration Date:  Plan of Care/Certification Expiration Date: 24    Frequency/Duration:   Plan Frequency: 1xweek/6 weeks      Time In/Out:   Time In: 858  Time Out: 959      PT Visit Info:         Visit Count:  2    OUTPATIENT PHYSICAL THERAPY:   Treatment Note 2024       Episode  (PFPT)               Treatment Diagnosis:    Other lack of coordination  Muscle weakness (generalized)  Urge incontinence  Medical/Referring Diagnosis:    Urge incontinence [N39.41]  Weakness of pelvic floor [N81.89]  Referring Physician:  Yumiko Agee DO MD Orders:  PT Eval and Treat   Return MD Appt:  24   Date of Onset:  Onset Date: 24     Allergies:   Insulin glargine, Buspirone, Codeine, Hydrocodone, Hydrocodone-acetaminophen, Liraglutide, Oxycodone, Tramadol, Trazodone, and Dulaglutide  Restrictions/Precautions:   None      Interventions Planned (Treatment may consist of any combination of the following):     See Assessment Note    Subjective Comments:   States that she had some bad couple days; was at the store and had the urge to go the bathroom and had to ask for a key which took a while and ended up leaking (urine). States the medication she started has been helpful to decrease night time urination frequency.   Initial Pain Level::     6/10  Post Session Pain Level:        /10  Medications Last Reviewed:  2024  Updated Objective Findings:  None Today  Treatment   THERAPEUTIC EXERCISE: (61 minutes): Exercises per grid below to improve    Date:  24 Date:  24 Date:     Activity/Exercise Parameters Parameters Parameters   HEP None given this day

## 2024-07-17 ENCOUNTER — HOSPITAL ENCOUNTER (OUTPATIENT)
Dept: PHYSICAL THERAPY | Age: 69
Setting detail: RECURRING SERIES
Discharge: HOME | End: 2024-07-20
Attending: OBSTETRICS & GYNECOLOGY
Payer: MEDICARE

## 2024-07-17 PROCEDURE — 97110 THERAPEUTIC EXERCISES: CPT

## 2024-07-17 ASSESSMENT — PAIN SCALES - GENERAL: PAINLEVEL_OUTOF10: 0

## 2024-07-17 NOTE — PROGRESS NOTES
Saskia Art  : 1955  Primary: [employer] Medicare-advantage Ppo (Medicare Managed)  Secondary:  [facility]  [address]  [address]  [facility] [address]  Phone: [phone]  Fax: [phone] Plan Frequency: 1xweek/6 weeks    Plan of Care/Certification Expiration Date: 24        Plan of Care/Certification Expiration Date:  Plan of Care/Certification Expiration Date: 24    Frequency/Duration:   Plan Frequency: 1xweek/6 weeks      Time In/Out:   Time In: 902  Time Out: 951      PT Visit Info:         Visit Count:  3    OUTPATIENT PHYSICAL THERAPY:   Treatment Note 2024       Episode  (PFPT)               Treatment Diagnosis:    Other lack of coordination  Muscle weakness (generalized)  Urge incontinence  Medical/Referring Diagnosis:    Urge incontinence [N39.41]  Weakness of pelvic floor [N81.89]  Referring Physician:  Yumiko Agee DO MD Orders:  PT Eval and Treat   Return MD Appt:  24   Date of Onset:  Onset Date: 24     Allergies:   Insulin glargine, Buspirone, Codeine, Hydrocodone, Hydrocodone-acetaminophen, Liraglutide, Oxycodone, Tramadol, Trazodone, and Dulaglutide  Restrictions/Precautions:   None      Interventions Planned (Treatment may consist of any combination of the following):     See Assessment Note    Subjective Comments:   States had a good week and did not have any leaking instances. Getting up to go once at night as opposed to 3-4x  Initial Pain Level::     0/10  Post Session Pain Level:       0/10  Medications Last Reviewed:  2024  Updated Objective Findings:  None Today  Treatment   THERAPEUTIC EXERCISE: (49 minutes): Exercises per grid below to improve    Date:  24 Date:  24 Date:e  24   Activity/Exercise Parameters Parameters Parameters   HEP None given this day      Diaphragmatic breathing  3 min  3 min    Supine PF +TA  Coordinated with breath, 30 reps  Coordinated with breath, 20 reps    S/l PF + TA+ ball press down

## 2024-07-22 ENCOUNTER — TELEPHONE (OUTPATIENT)
Dept: SLEEP MEDICINE | Age: 69
End: 2024-07-22

## 2024-07-22 NOTE — TELEPHONE ENCOUNTER
I called but was unable to leave a voicemail as her voicemail is full, sent a message to her on [employer] regarding abnormal labs for restless leg syndrome, with everything that is going on I do think we should make a quick virtual visit to deal with the problems and ask her to call and get that scheduled.     Her ferritin was 15 magnesium 1.6 and vitamin D was low at 27.

## 2024-07-24 ENCOUNTER — HOSPITAL ENCOUNTER (OUTPATIENT)
Dept: PHYSICAL THERAPY | Age: 69
Setting detail: RECURRING SERIES
Discharge: HOME | End: 2024-07-27
Attending: OBSTETRICS & GYNECOLOGY
Payer: MEDICARE

## 2024-07-24 PROCEDURE — 97110 THERAPEUTIC EXERCISES: CPT

## 2024-07-24 PROCEDURE — 97140 MANUAL THERAPY 1/> REGIONS: CPT

## 2024-07-24 NOTE — PROGRESS NOTES
score, ranging from 0-100.  This process is repeated for each column representing bowel, bladder, and pelvic pain.  Communication/Consultation:  None today  Equipment provided today:  HEP  Recommendations/Intent for next treatment session: Next visit will focus on assess cont uptraining     >Total Treatment Billable Duration:  48 minutes TE and 8 minutes MT   Time In: 0900  Time Out: 0957    SIRISHA SEBASTIAN PT         Charge Capture  Events  [employer] Portal  Appt Desk  Attendance Report     Future Appointments   Date Time Provider Department Center   7/31/2024  9:00 AM Sirisha Sebastian, [facility]   8/7/2024  9:00 AM Sirisha Sebastian, [facility]   8/8/2024  8:10 AM Lillian Huang MD [facility]   8/14/2024  9:00 AM Sirisha Sebastian, [facility]   8/28/2024  9:15 AM Yumiko Agee DO [facility]   10/16/2024  9:00 AM Jennifer Banks III, MD [facility]

## 2024-07-29 ENCOUNTER — TELEPHONE (OUTPATIENT)
Dept: SLEEP MEDICINE | Age: 69
End: 2024-07-29

## 2024-07-31 ENCOUNTER — HOSPITAL ENCOUNTER (OUTPATIENT)
Dept: PHYSICAL THERAPY | Age: 69
Setting detail: RECURRING SERIES
Discharge: HOME | End: 2024-08-03
Attending: OBSTETRICS & GYNECOLOGY
Payer: MEDICARE

## 2024-07-31 PROCEDURE — 97530 THERAPEUTIC ACTIVITIES: CPT

## 2024-07-31 ASSESSMENT — PAIN SCALES - GENERAL: PAINLEVEL_OUTOF10: 0

## 2024-07-31 NOTE — PROGRESS NOTES
Saskia Zafar Art  : 1955  Primary: [employer] Medicare-advantage Ppo (Medicare Managed)  Secondary:  [facility]  [address]   [address]  [facility] [address]  Phone: [phone]  Fax: [phone] Plan Frequency: 1xweek/6 weeks    Plan of Care/Certification Expiration Date: 24        Plan of Care/Certification Expiration Date:  Plan of Care/Certification Expiration Date: 24    Frequency/Duration:   Plan Frequency: 1xweek/6 weeks      Time In/Out:   Time In: 0900  Time Out: 923      PT Visit Info:         Visit Count:  5    OUTPATIENT PHYSICAL THERAPY:   Treatment and Progress Note 2024       Episode  (PFPT)               Treatment Diagnosis:    Other lack of coordination  Muscle weakness (generalized)  Urge incontinence  Medical/Referring Diagnosis:    Urge incontinence [N39.41]  Weakness of pelvic floor [N81.89]  Referring Physician:  Yumiko Agee DO MD Orders:  PT Eval and Treat   Return MD Appt:  24   Date of Onset:  Onset Date: 24     Allergies:   Insulin glargine, Buspirone, Codeine, Hydrocodone, Hydrocodone-acetaminophen, Liraglutide, Oxycodone, Tramadol, Trazodone, and Dulaglutide  Restrictions/Precautions:   None      Interventions Planned (Treatment may consist of any combination of the following):     See Assessment Note    Subjective Comments:   Pt states that she is feeling better and feels confident with keeping up with HEP at home and requested DC today. Pt declined there ex today  Initial Pain Level::     0/10  Post Session Pain Level:       0/10  Medications Last Reviewed:  2024  Updated Objective Findings:  See Discharge Note from today    Treatment   THERAPEUTIC EXERCISE: (48minutes): Exercises per grid below to improve    Date:  24 Date:  24 Date:e  24 Date:  24   Activity/Exercise Parameters Parameters Parameters Parameters   HEP None given this day       Diaphragmatic breathing  3 min  3 min     Supine PF +TA  Coordinated

## 2024-07-31 NOTE — THERAPY DISCHARGE
has a strong odor. Pt states that she did have an instance where she pulled into her driveway suddenly got a sudden urge and had to relieve herself in the the garage as she states that she is getting these urgers a lot. Pt states that after her hysterectomy (97') she did not notice incomplete emptying.     Current medical management includes had a hysterectomy and states that she had bladder trauma during the surgery.     She does have a history of DM; is taking ozempic   She does not have a history of sleep apnea.    Urinary: Frequency 5-8 x/day, mostly once x/night.  Positive for nocturia.   Negative for stress urinary incontinence, urge urinary incontinence, urinary urgency, urinary frequency, urinary hesitancy/intermittency, dysuria, hematuria, and nocturnal enuresis.   Pt does not use  pads  for urinary protection; 0 pads per day (PPD); if patient is travelling will occasionally wear a liner.              Fluid intake: 30-50 oz water/day; bladder irritants include: unknown. Pt does not limit fluid intake due to bladder control.    Bowel: Frequency sometimes can go a week without going but sometimes can go 3x a week. Does have hx of colon cancer.    Positive for  denies BM symptoms  .   Pt does not use  pads  for bowel protection; 0 pads per day (PPD). [place] stool type: 7. Use of stool softeners or laxatives? No    Sexual: Pt is not sexually active with male partners. negative for dyspareunia. History of sexual abuse: No    Pelvic Organ Prolapse/Pelvic Pain: Location: heaviness/pressure in vagina. Max pain 6/10. Min pain 0/10.    Menstrual History: hx of hysterectomy     OB History: Number of pregnancies: 2 Number of vaginal births: 2 Number of caesarean births : 0.     Patient Stated Goal(s):  \"to help with urgency \"    Initial Pain Level:     0/10   Post Session Pain Level:     0/10    Past Medical History/Comorbidities:   Ms. Art  has a past medical history of Anemia, Aortic stenosis, Asthma, CAD

## 2024-08-08 ENCOUNTER — OFFICE VISIT (OUTPATIENT)
Dept: FAMILY MEDICINE CLINIC | Facility: CLINIC | Age: 69
End: 2024-08-08
Payer: MEDICARE

## 2024-08-08 VITALS
HEIGHT: 67 IN | DIASTOLIC BLOOD PRESSURE: 70 MMHG | BODY MASS INDEX: 39.62 KG/M2 | TEMPERATURE: 98 F | WEIGHT: 252.4 LBS | OXYGEN SATURATION: 98 % | SYSTOLIC BLOOD PRESSURE: 116 MMHG | RESPIRATION RATE: 16 BRPM | HEART RATE: 83 BPM

## 2024-08-08 DIAGNOSIS — E11.40 TYPE 2 DIABETES MELLITUS WITH DIABETIC NEUROPATHY, WITH LONG-TERM CURRENT USE OF INSULIN (HCC): Chronic | ICD-10-CM

## 2024-08-08 DIAGNOSIS — N39.41 URGE INCONTINENCE: Chronic | ICD-10-CM

## 2024-08-08 DIAGNOSIS — I10 ESSENTIAL (PRIMARY) HYPERTENSION: Chronic | ICD-10-CM

## 2024-08-08 DIAGNOSIS — E66.01 CLASS 2 SEVERE OBESITY DUE TO EXCESS CALORIES WITH SERIOUS COMORBIDITY AND BODY MASS INDEX (BMI) OF 39.0 TO 39.9 IN ADULT (HCC): Chronic | ICD-10-CM

## 2024-08-08 DIAGNOSIS — F41.1 GAD (GENERALIZED ANXIETY DISORDER): Chronic | ICD-10-CM

## 2024-08-08 DIAGNOSIS — Z79.4 TYPE 2 DIABETES MELLITUS WITH DIABETIC NEUROPATHY, WITH LONG-TERM CURRENT USE OF INSULIN (HCC): Chronic | ICD-10-CM

## 2024-08-08 DIAGNOSIS — F51.04 PSYCHOPHYSIOLOGICAL INSOMNIA: ICD-10-CM

## 2024-08-08 DIAGNOSIS — E78.2 HYPERLIPEMIA, MIXED: Chronic | ICD-10-CM

## 2024-08-08 DIAGNOSIS — G25.81 RESTLESS LEG SYNDROME: Primary | Chronic | ICD-10-CM

## 2024-08-08 DIAGNOSIS — F33.0 MAJOR DEPRESSIVE DISORDER, RECURRENT, MILD (HCC): Chronic | ICD-10-CM

## 2024-08-08 PROBLEM — E66.812 CLASS 2 SEVERE OBESITY DUE TO EXCESS CALORIES WITH SERIOUS COMORBIDITY AND BODY MASS INDEX (BMI) OF 39.0 TO 39.9 IN ADULT: Status: ACTIVE | Noted: 2024-02-08

## 2024-08-08 LAB
ALBUMIN SERPL-MCNC: 3.7 G/DL (ref 3.2–4.6)
ALBUMIN/GLOB SERPL: 1.3 (ref 1–1.9)
ALP SERPL-CCNC: 71 U/L (ref 35–104)
ALT SERPL-CCNC: 18 U/L (ref 12–65)
ANION GAP SERPL CALC-SCNC: 9 MMOL/L (ref 9–18)
AST SERPL-CCNC: 22 U/L (ref 15–37)
BASOPHILS # BLD: 0.1 K/UL (ref 0–0.2)
BASOPHILS NFR BLD: 1 % (ref 0–2)
BILIRUB SERPL-MCNC: <0.2 MG/DL (ref 0–1.2)
BUN SERPL-MCNC: 16 MG/DL (ref 8–23)
CALCIUM SERPL-MCNC: 9.2 MG/DL (ref 8.8–10.2)
CHLORIDE SERPL-SCNC: 105 MMOL/L (ref 98–107)
CHOLEST SERPL-MCNC: 111 MG/DL (ref 0–200)
CO2 SERPL-SCNC: 27 MMOL/L (ref 20–28)
CREAT SERPL-MCNC: 0.85 MG/DL (ref 0.6–1.1)
DIFFERENTIAL METHOD BLD: ABNORMAL
EOSINOPHIL # BLD: 0.3 K/UL (ref 0–0.8)
EOSINOPHIL NFR BLD: 4 % (ref 0.5–7.8)
ERYTHROCYTE [DISTWIDTH] IN BLOOD BY AUTOMATED COUNT: 17.2 % (ref 11.9–14.6)
GLOBULIN SER CALC-MCNC: 2.9 G/DL (ref 2.3–3.5)
GLUCOSE SERPL-MCNC: 125 MG/DL (ref 70–99)
HCT VFR BLD AUTO: 39.9 % (ref 35.8–46.3)
HDLC SERPL-MCNC: 49 MG/DL (ref 40–60)
HDLC SERPL: 2.3 (ref 0–5)
HGB BLD-MCNC: 11.7 G/DL (ref 11.7–15.4)
IMM GRANULOCYTES # BLD AUTO: 0 K/UL (ref 0–0.5)
IMM GRANULOCYTES NFR BLD AUTO: 0 % (ref 0–5)
LDLC SERPL CALC-MCNC: 44 MG/DL (ref 0–100)
LYMPHOCYTES # BLD: 2 K/UL (ref 0.5–4.6)
LYMPHOCYTES NFR BLD: 23 % (ref 13–44)
MCH RBC QN AUTO: 24.6 PG (ref 26.1–32.9)
MCHC RBC AUTO-ENTMCNC: 29.3 G/DL (ref 31.4–35)
MCV RBC AUTO: 83.8 FL (ref 82–102)
MONOCYTES # BLD: 0.5 K/UL (ref 0.1–1.3)
MONOCYTES NFR BLD: 6 % (ref 4–12)
NEUTS SEG # BLD: 5.7 K/UL (ref 1.7–8.2)
NEUTS SEG NFR BLD: 67 % (ref 43–78)
NRBC # BLD: 0 K/UL (ref 0–0.2)
PLATELET # BLD AUTO: 230 K/UL (ref 150–450)
PMV BLD AUTO: 10.4 FL (ref 9.4–12.3)
POTASSIUM SERPL-SCNC: 4.5 MMOL/L (ref 3.5–5.1)
PROT SERPL-MCNC: 6.5 G/DL (ref 6.3–8.2)
RBC # BLD AUTO: 4.76 M/UL (ref 4.05–5.2)
SODIUM SERPL-SCNC: 141 MMOL/L (ref 136–145)
TRIGL SERPL-MCNC: 92 MG/DL (ref 0–150)
VLDLC SERPL CALC-MCNC: 18 MG/DL (ref 6–23)
WBC # BLD AUTO: 8.6 K/UL (ref 4.3–11.1)

## 2024-08-08 PROCEDURE — 1123F ACP DISCUSS/DSCN MKR DOCD: CPT | Performed by: FAMILY MEDICINE

## 2024-08-08 PROCEDURE — 99215 OFFICE O/P EST HI 40 MIN: CPT | Performed by: FAMILY MEDICINE

## 2024-08-08 PROCEDURE — 3074F SYST BP LT 130 MM HG: CPT | Performed by: FAMILY MEDICINE

## 2024-08-08 PROCEDURE — 3052F HG A1C>EQUAL 8.0%<EQUAL 9.0%: CPT | Performed by: FAMILY MEDICINE

## 2024-08-08 PROCEDURE — 3078F DIAST BP <80 MM HG: CPT | Performed by: FAMILY MEDICINE

## 2024-08-08 RX ORDER — GABAPENTIN 400 MG/1
CAPSULE ORAL
Qty: 360 CAPSULE | Refills: 1 | Status: SHIPPED | OUTPATIENT
Start: 2024-08-08 | End: 2024-09-08

## 2024-08-08 RX ORDER — ROSUVASTATIN CALCIUM 20 MG/1
20 TABLET, COATED ORAL DAILY
Qty: 90 TABLET | Refills: 1 | Status: SHIPPED | OUTPATIENT
Start: 2024-08-08

## 2024-08-08 RX ORDER — LOSARTAN POTASSIUM 100 MG/1
100 TABLET ORAL DAILY
Qty: 90 TABLET | Refills: 1 | Status: SHIPPED | OUTPATIENT
Start: 2024-08-08 | End: 2025-02-04

## 2024-08-08 RX ORDER — SEMAGLUTIDE 1.34 MG/ML
INJECTION, SOLUTION SUBCUTANEOUS
COMMUNITY

## 2024-08-08 RX ORDER — TOLTERODINE TARTRATE 2 MG/1
2 TABLET, EXTENDED RELEASE ORAL 2 TIMES DAILY
COMMUNITY
End: 2024-08-08 | Stop reason: SDUPTHER

## 2024-08-08 RX ORDER — TOLTERODINE TARTRATE 2 MG/1
2 TABLET, EXTENDED RELEASE ORAL 2 TIMES DAILY
Qty: 180 TABLET | Refills: 1 | Status: SHIPPED | OUTPATIENT
Start: 2024-08-08

## 2024-08-08 RX ORDER — VENLAFAXINE HYDROCHLORIDE 75 MG/1
CAPSULE, EXTENDED RELEASE ORAL
Qty: 270 CAPSULE | Refills: 1 | Status: SHIPPED | OUTPATIENT
Start: 2024-08-08

## 2024-08-08 RX ORDER — ROPINIROLE 3 MG/1
3 TABLET, FILM COATED ORAL 2 TIMES DAILY
Qty: 180 TABLET | Refills: 1 | Status: SHIPPED | OUTPATIENT
Start: 2024-08-08

## 2024-08-08 RX ORDER — FAMOTIDINE 40 MG/1
40 TABLET, FILM COATED ORAL 2 TIMES DAILY
Qty: 180 TABLET | Refills: 1 | Status: CANCELLED | OUTPATIENT
Start: 2024-08-08

## 2024-08-08 ASSESSMENT — ENCOUNTER SYMPTOMS
ABDOMINAL PAIN: 0
WHEEZING: 0
ABDOMINAL DISTENTION: 0
PHOTOPHOBIA: 0
COUGH: 0
SHORTNESS OF BREATH: 0
NAUSEA: 1
VOMITING: 0
APNEA: 1
SINUS PAIN: 0
DIARRHEA: 0

## 2024-08-08 ASSESSMENT — PATIENT HEALTH QUESTIONNAIRE - PHQ9
2. FEELING DOWN, DEPRESSED OR HOPELESS: NOT AT ALL
10. IF YOU CHECKED OFF ANY PROBLEMS, HOW DIFFICULT HAVE THESE PROBLEMS MADE IT FOR YOU TO DO YOUR WORK, TAKE CARE OF THINGS AT HOME, OR GET ALONG WITH OTHER PEOPLE: NOT DIFFICULT AT ALL
SUM OF ALL RESPONSES TO PHQ QUESTIONS 1-9: 0
1. LITTLE INTEREST OR PLEASURE IN DOING THINGS: NOT AT ALL
SUM OF ALL RESPONSES TO PHQ9 QUESTIONS 1 & 2: 0
SUM OF ALL RESPONSES TO PHQ QUESTIONS 1-9: 0
3. TROUBLE FALLING OR STAYING ASLEEP: NOT AT ALL
SUM OF ALL RESPONSES TO PHQ9 QUESTIONS 1 & 2: 0
SUM OF ALL RESPONSES TO PHQ QUESTIONS 1-9: 0
6. FEELING BAD ABOUT YOURSELF - OR THAT YOU ARE A FAILURE OR HAVE LET YOURSELF OR YOUR FAMILY DOWN: NOT AT ALL
SUM OF ALL RESPONSES TO PHQ QUESTIONS 1-9: 0
1. LITTLE INTEREST OR PLEASURE IN DOING THINGS: NOT AT ALL
4. FEELING TIRED OR HAVING LITTLE ENERGY: NOT AT ALL
9. THOUGHTS THAT YOU WOULD BE BETTER OFF DEAD, OR OF HURTING YOURSELF: NOT AT ALL
SUM OF ALL RESPONSES TO PHQ QUESTIONS 1-9: 0
8. MOVING OR SPEAKING SO SLOWLY THAT OTHER PEOPLE COULD HAVE NOTICED. OR THE OPPOSITE, BEING SO FIGETY OR RESTLESS THAT YOU HAVE BEEN MOVING AROUND A LOT MORE THAN USUAL: NOT AT ALL
7. TROUBLE CONCENTRATING ON THINGS, SUCH AS READING THE NEWSPAPER OR WATCHING TELEVISION: NOT AT ALL
2. FEELING DOWN, DEPRESSED OR HOPELESS: NOT AT ALL
SUM OF ALL RESPONSES TO PHQ QUESTIONS 1-9: 0
5. POOR APPETITE OR OVEREATING: NOT AT ALL
SUM OF ALL RESPONSES TO PHQ QUESTIONS 1-9: 0
SUM OF ALL RESPONSES TO PHQ QUESTIONS 1-9: 0

## 2024-08-08 NOTE — PROGRESS NOTES
Saskia Art (:  1955) is a 69 y.o. female,Established patient, here for evaluation of the following chief complaint(s):  Medication Refill and Labs Only      Assessment & Plan   ASSESSMENT/PLAN:  1. Restless leg syndrome  -     gabapentin (NEURONTIN) 400 MG capsule; Take 2 capsules po after breakfast and 2 capsules after dinner, Disp-360 capsule, R-1Normal  -     rOPINIRole (REQUIP) 3 MG tablet; Take 1 tablet by mouth 2 times daily, Disp-180 tablet, R-1Normal  2. Hyperlipemia, mixed  -     rosuvastatin (CRESTOR) 20 MG tablet; Take 1 tablet by mouth daily Take 1 tab po daily with dinner, Disp-90 tablet, R-1Normal  -     Comprehensive Metabolic Panel; Future  -     Lipid Panel; Future  3. Essential (primary) hypertension  -     losartan (COZAAR) 100 MG tablet; Take 1 tablet by mouth daily, Disp-90 tablet, R-1Normal  -     CBC with Auto Differential; Future  -     Comprehensive Metabolic Panel; Future  4. Urge incontinence  -     tolterodine (DETROL) 2 MG tablet; Take 1 tablet by mouth 2 times daily, Disp-180 tablet, R-1Normal  5. Type 2 diabetes mellitus with diabetic neuropathy, with long-term current use of insulin ([facility])  -     gabapentin (NEURONTIN) 400 MG capsule; Take 2 capsules po after breakfast and 2 capsules after dinner, Disp-360 capsule, R-1Normal  6. Major depressive disorder, recurrent, mild ([facility])  -     venlafaxine (EFFEXOR XR) 75 MG extended release capsule; Take 3 capsules po once daily, Disp-270 capsule, R-1Normal  7. ROWDY (generalized anxiety disorder)  -     venlafaxine (EFFEXOR XR) 75 MG extended release capsule; Take 3 capsules po once daily, Disp-270 capsule, R-1Normal  8. Class 2 severe obesity due to excess calories with serious comorbidity and body mass index (BMI) of 39.0 to 39.9 in adult ([facility])  9. Psychophysiological insomnia    RLS, Diabetic neuropathy- well controlled, continue current management.  HTN, HLP- well controlled, continue current management, await labs.  Urge

## 2024-09-09 DIAGNOSIS — Z78.0 POSTMENOPAUSAL: Chronic | ICD-10-CM

## 2024-09-12 DIAGNOSIS — Z12.31 ENCOUNTER FOR SCREENING MAMMOGRAM FOR MALIGNANT NEOPLASM OF BREAST: Chronic | ICD-10-CM

## 2024-09-14 ENCOUNTER — TELEPHONE (OUTPATIENT)
Dept: FAMILY MEDICINE CLINIC | Facility: CLINIC | Age: 69
End: 2024-09-14

## 2024-12-05 DIAGNOSIS — F33.0 MAJOR DEPRESSIVE DISORDER, RECURRENT, MILD (HCC): Chronic | ICD-10-CM

## 2024-12-05 DIAGNOSIS — F41.1 GAD (GENERALIZED ANXIETY DISORDER): Chronic | ICD-10-CM

## 2024-12-05 RX ORDER — VENLAFAXINE HYDROCHLORIDE 75 MG/1
CAPSULE, EXTENDED RELEASE ORAL
Qty: 270 CAPSULE | Refills: 1 | Status: CANCELLED | OUTPATIENT
Start: 2024-12-05

## 2024-12-05 NOTE — TELEPHONE ENCOUNTER
I sent Effexor for 90 days with 1 refill in August. Could you please call the pharmacy and ask. Also let the patient know if the issue is resolved.

## 2025-01-08 ENCOUNTER — OFFICE VISIT (OUTPATIENT)
Dept: FAMILY MEDICINE CLINIC | Facility: CLINIC | Age: 70
End: 2025-01-08

## 2025-01-08 VITALS
RESPIRATION RATE: 18 BRPM | BODY MASS INDEX: 40.18 KG/M2 | DIASTOLIC BLOOD PRESSURE: 76 MMHG | HEIGHT: 67 IN | HEART RATE: 80 BPM | SYSTOLIC BLOOD PRESSURE: 110 MMHG | WEIGHT: 256 LBS | TEMPERATURE: 97 F | OXYGEN SATURATION: 95 %

## 2025-01-08 DIAGNOSIS — N76.0 BV (BACTERIAL VAGINOSIS): Primary | ICD-10-CM

## 2025-01-08 DIAGNOSIS — B96.89 BV (BACTERIAL VAGINOSIS): Primary | ICD-10-CM

## 2025-01-08 DIAGNOSIS — F41.1 GAD (GENERALIZED ANXIETY DISORDER): Chronic | ICD-10-CM

## 2025-01-08 DIAGNOSIS — F33.0 MAJOR DEPRESSIVE DISORDER, RECURRENT, MILD (HCC): Chronic | ICD-10-CM

## 2025-01-08 DIAGNOSIS — N89.8 VAGINAL ODOR: ICD-10-CM

## 2025-01-08 LAB
BACTERIA, WET MOUNT, POC: ABNORMAL
CLUE CELLS, WET MOUNT, POC: PRESENT
RBC WET MOUNT, POC: NEGATIVE
TRICH, WET MOUNT, POC: ABNORMAL
WBC, WET MOUNT, POC: ABNORMAL
YEAST, WET MOUNT, POC: ABNORMAL

## 2025-01-08 RX ORDER — VENLAFAXINE HYDROCHLORIDE 75 MG/1
CAPSULE, EXTENDED RELEASE ORAL
Qty: 270 CAPSULE | Refills: 1 | Status: SHIPPED | OUTPATIENT
Start: 2025-01-08

## 2025-01-08 RX ORDER — PEN NEEDLE, DIABETIC 29 G X1/2"
NEEDLE, DISPOSABLE MISCELLANEOUS
COMMUNITY
Start: 2024-11-13

## 2025-01-08 RX ORDER — METRONIDAZOLE 500 MG/1
500 TABLET ORAL 2 TIMES DAILY
Qty: 14 TABLET | Refills: 0 | Status: SHIPPED | OUTPATIENT
Start: 2025-01-08 | End: 2025-01-15

## 2025-01-08 RX ORDER — FLUCONAZOLE 150 MG/1
150 TABLET ORAL WEEKLY
Qty: 2 TABLET | Refills: 0 | Status: SHIPPED | OUTPATIENT
Start: 2025-01-08

## 2025-01-08 SDOH — ECONOMIC STABILITY: FOOD INSECURITY: WITHIN THE PAST 12 MONTHS, YOU WORRIED THAT YOUR FOOD WOULD RUN OUT BEFORE YOU GOT MONEY TO BUY MORE.: NEVER TRUE

## 2025-01-08 SDOH — ECONOMIC STABILITY: FOOD INSECURITY: WITHIN THE PAST 12 MONTHS, THE FOOD YOU BOUGHT JUST DIDN'T LAST AND YOU DIDN'T HAVE MONEY TO GET MORE.: NEVER TRUE

## 2025-01-08 ASSESSMENT — PATIENT HEALTH QUESTIONNAIRE - PHQ9
1. LITTLE INTEREST OR PLEASURE IN DOING THINGS: NOT AT ALL
SUM OF ALL RESPONSES TO PHQ QUESTIONS 1-9: 0
2. FEELING DOWN, DEPRESSED OR HOPELESS: NOT AT ALL
SUM OF ALL RESPONSES TO PHQ QUESTIONS 1-9: 0
SUM OF ALL RESPONSES TO PHQ9 QUESTIONS 1 & 2: 0
SUM OF ALL RESPONSES TO PHQ QUESTIONS 1-9: 0
SUM OF ALL RESPONSES TO PHQ QUESTIONS 1-9: 0

## 2025-01-08 ASSESSMENT — ENCOUNTER SYMPTOMS
DIARRHEA: 0
CONSTIPATION: 0
RHINORRHEA: 0
EYE PAIN: 0
BLOOD IN STOOL: 0
SHORTNESS OF BREATH: 0
VOMITING: 0
WHEEZING: 0
COUGH: 0
ABDOMINAL PAIN: 0
CHEST TIGHTNESS: 0
EYE DISCHARGE: 0

## 2025-01-08 NOTE — PROGRESS NOTES
Saskia Art (:  1955) is a 69 y.o. female,Established patient, here for evaluation of the following chief complaint(s):  OTHER (Urine odor with vaginal pressure )         Assessment & Plan  BV (bacterial vaginosis)   Chronic, worsening (exacerbation), changes made today: treated BV with Flagyl, medication adherence emphasized, and lifestyle modifications recommended    Orders:    metroNIDAZOLE (FLAGYL) 500 MG tablet; Take 1 tablet by mouth 2 times daily for 7 days    fluconazole (DIFLUCAN) 150 MG tablet; Take 1 tablet by mouth once a week    Will pre-treat with Diflucan as she may develop yeast when she completes the Flagyl.  She is diabetic which increases the risk of yeast.    Clue cells on wet prep.   Patient may benefit from vaginal estrogen cream to increase moisture to the vagina--especially if this comes right back.   Mild bar soaps only--no scented soaps or body wash.   Major depressive disorder, recurrent, mild (HCC)   Chronic, at goal (stable), continue current treatment plan, medication adherence emphasized, and lifestyle modifications recommended    Orders:    venlafaxine (EFFEXOR XR) 75 MG extended release capsule; Take 3 capsules po once daily    Vaginal odor   Chronic, worsening (exacerbation), will do wet prep, patient is agreeable.     Orders:    AMB POC SMEAR, STAIN & INTERPRET, WET [place]    ROWDY (generalized anxiety disorder)   Chronic, at goal (stable), continue current treatment plan, medication adherence emphasized, and lifestyle modifications recommended    Orders:    venlafaxine (EFFEXOR XR) 75 MG extended release capsule; Take 3 capsules po once daily      No follow-ups on file.       Subjective   Urine has a fishy awful odor.  Can smell after leaving the room.  Does not always smell it in the bathroom.  Did have a trouble with holding her urine--but that got better with PT.  No fever.  Not sexually active.  Symptoms have been going on for a month. Patient was seeing

## 2025-01-09 NOTE — ASSESSMENT & PLAN NOTE
Chronic, at goal (stable), continue current treatment plan, medication adherence emphasized, and lifestyle modifications recommended    Orders:    venlafaxine (EFFEXOR XR) 75 MG extended release capsule; Take 3 capsules po once daily

## 2025-02-12 ENCOUNTER — OFFICE VISIT (OUTPATIENT)
Dept: FAMILY MEDICINE CLINIC | Facility: CLINIC | Age: 70
End: 2025-02-12
Payer: MEDICARE

## 2025-02-12 VITALS
SYSTOLIC BLOOD PRESSURE: 124 MMHG | DIASTOLIC BLOOD PRESSURE: 60 MMHG | HEIGHT: 67 IN | HEART RATE: 80 BPM | TEMPERATURE: 97.5 F | OXYGEN SATURATION: 98 % | WEIGHT: 254 LBS | BODY MASS INDEX: 39.87 KG/M2 | RESPIRATION RATE: 16 BRPM

## 2025-02-12 DIAGNOSIS — E78.2 HYPERLIPEMIA, MIXED: Chronic | ICD-10-CM

## 2025-02-12 DIAGNOSIS — F41.1 GAD (GENERALIZED ANXIETY DISORDER): Chronic | ICD-10-CM

## 2025-02-12 DIAGNOSIS — E11.40 TYPE 2 DIABETES MELLITUS WITH DIABETIC NEUROPATHY, WITH LONG-TERM CURRENT USE OF INSULIN (HCC): Chronic | ICD-10-CM

## 2025-02-12 DIAGNOSIS — E66.812 CLASS 2 SEVERE OBESITY DUE TO EXCESS CALORIES WITH SERIOUS COMORBIDITY AND BODY MASS INDEX (BMI) OF 39.0 TO 39.9 IN ADULT: ICD-10-CM

## 2025-02-12 DIAGNOSIS — E78.2 HYPERLIPEMIA, MIXED: Primary | Chronic | ICD-10-CM

## 2025-02-12 DIAGNOSIS — G25.81 RESTLESS LEG SYNDROME: Chronic | ICD-10-CM

## 2025-02-12 DIAGNOSIS — I10 ESSENTIAL (PRIMARY) HYPERTENSION: Chronic | ICD-10-CM

## 2025-02-12 DIAGNOSIS — Z79.4 TYPE 2 DIABETES MELLITUS WITH DIABETIC NEUROPATHY, WITH LONG-TERM CURRENT USE OF INSULIN (HCC): Chronic | ICD-10-CM

## 2025-02-12 DIAGNOSIS — E66.01 CLASS 2 SEVERE OBESITY DUE TO EXCESS CALORIES WITH SERIOUS COMORBIDITY AND BODY MASS INDEX (BMI) OF 39.0 TO 39.9 IN ADULT: ICD-10-CM

## 2025-02-12 DIAGNOSIS — F51.04 PSYCHOPHYSIOLOGICAL INSOMNIA: Chronic | ICD-10-CM

## 2025-02-12 DIAGNOSIS — F33.0 MAJOR DEPRESSIVE DISORDER, RECURRENT, MILD (HCC): Chronic | ICD-10-CM

## 2025-02-12 DIAGNOSIS — N39.41 URGE INCONTINENCE: Chronic | ICD-10-CM

## 2025-02-12 LAB
ALBUMIN SERPL-MCNC: 3.6 G/DL (ref 3.2–4.6)
ALBUMIN/GLOB SERPL: 1 (ref 1–1.9)
ALP SERPL-CCNC: 82 U/L (ref 35–104)
ALT SERPL-CCNC: 17 U/L (ref 8–45)
ANION GAP SERPL CALC-SCNC: 12 MMOL/L (ref 7–16)
AST SERPL-CCNC: 19 U/L (ref 15–37)
BASOPHILS # BLD: 0.04 K/UL (ref 0–0.2)
BASOPHILS NFR BLD: 0.6 % (ref 0–2)
BILIRUB SERPL-MCNC: 0.2 MG/DL (ref 0–1.2)
BUN SERPL-MCNC: 13 MG/DL (ref 8–23)
CALCIUM SERPL-MCNC: 9.5 MG/DL (ref 8.8–10.2)
CHLORIDE SERPL-SCNC: 105 MMOL/L (ref 98–107)
CHOLEST SERPL-MCNC: 118 MG/DL (ref 0–200)
CO2 SERPL-SCNC: 23 MMOL/L (ref 20–29)
CREAT SERPL-MCNC: 0.92 MG/DL (ref 0.6–1.1)
DIFFERENTIAL METHOD BLD: ABNORMAL
EOSINOPHIL # BLD: 0.2 K/UL (ref 0–0.8)
EOSINOPHIL NFR BLD: 3 % (ref 0.5–7.8)
ERYTHROCYTE [DISTWIDTH] IN BLOOD BY AUTOMATED COUNT: 17.5 % (ref 11.9–14.6)
GLOBULIN SER CALC-MCNC: 3.6 G/DL (ref 2.3–3.5)
GLUCOSE SERPL-MCNC: 150 MG/DL (ref 70–99)
HCT VFR BLD AUTO: 41.3 % (ref 35.8–46.3)
HDLC SERPL-MCNC: 46 MG/DL (ref 40–60)
HDLC SERPL: 2.5 (ref 0–5)
HGB BLD-MCNC: 12.9 G/DL (ref 11.7–15.4)
IMM GRANULOCYTES # BLD AUTO: 0.01 K/UL (ref 0–0.5)
IMM GRANULOCYTES NFR BLD AUTO: 0.2 % (ref 0–5)
LDLC SERPL CALC-MCNC: 51 MG/DL (ref 0–100)
LYMPHOCYTES # BLD: 1.66 K/UL (ref 0.5–4.6)
LYMPHOCYTES NFR BLD: 25 % (ref 13–44)
MCH RBC QN AUTO: 25.1 PG (ref 26.1–32.9)
MCHC RBC AUTO-ENTMCNC: 31.2 G/DL (ref 31.4–35)
MCV RBC AUTO: 80.4 FL (ref 82–102)
MONOCYTES # BLD: 0.39 K/UL (ref 0.1–1.3)
MONOCYTES NFR BLD: 5.9 % (ref 4–12)
NEUTS SEG # BLD: 4.34 K/UL (ref 1.7–8.2)
NEUTS SEG NFR BLD: 65.3 % (ref 43–78)
NRBC # BLD: 0 K/UL (ref 0–0.2)
PLATELET # BLD AUTO: 223 K/UL (ref 150–450)
PMV BLD AUTO: 10.3 FL (ref 9.4–12.3)
POTASSIUM SERPL-SCNC: 4.3 MMOL/L (ref 3.5–5.1)
PROT SERPL-MCNC: 7.2 G/DL (ref 6.3–8.2)
RBC # BLD AUTO: 5.14 M/UL (ref 4.05–5.2)
SODIUM SERPL-SCNC: 141 MMOL/L (ref 136–145)
TRIGL SERPL-MCNC: 101 MG/DL (ref 0–150)
VLDLC SERPL CALC-MCNC: 20 MG/DL (ref 6–23)
WBC # BLD AUTO: 6.6 K/UL (ref 4.3–11.1)

## 2025-02-12 PROCEDURE — 1123F ACP DISCUSS/DSCN MKR DOCD: CPT | Performed by: FAMILY MEDICINE

## 2025-02-12 PROCEDURE — 3078F DIAST BP <80 MM HG: CPT | Performed by: FAMILY MEDICINE

## 2025-02-12 PROCEDURE — 3074F SYST BP LT 130 MM HG: CPT | Performed by: FAMILY MEDICINE

## 2025-02-12 PROCEDURE — G2211 COMPLEX E/M VISIT ADD ON: HCPCS | Performed by: FAMILY MEDICINE

## 2025-02-12 PROCEDURE — 99215 OFFICE O/P EST HI 40 MIN: CPT | Performed by: FAMILY MEDICINE

## 2025-02-12 PROCEDURE — 1160F RVW MEDS BY RX/DR IN RCRD: CPT | Performed by: FAMILY MEDICINE

## 2025-02-12 PROCEDURE — 1159F MED LIST DOCD IN RCRD: CPT | Performed by: FAMILY MEDICINE

## 2025-02-12 RX ORDER — FAMOTIDINE 40 MG/1
40 TABLET, FILM COATED ORAL 2 TIMES DAILY
Qty: 180 TABLET | Refills: 1 | Status: CANCELLED | OUTPATIENT
Start: 2025-02-12

## 2025-02-12 RX ORDER — TOLTERODINE TARTRATE 2 MG/1
2 TABLET, EXTENDED RELEASE ORAL 2 TIMES DAILY
Qty: 180 TABLET | Refills: 1 | Status: SHIPPED | OUTPATIENT
Start: 2025-02-12

## 2025-02-12 RX ORDER — VENLAFAXINE HYDROCHLORIDE 75 MG/1
CAPSULE, EXTENDED RELEASE ORAL
Qty: 270 CAPSULE | Refills: 1 | Status: SHIPPED | OUTPATIENT
Start: 2025-02-12

## 2025-02-12 RX ORDER — ROSUVASTATIN CALCIUM 20 MG/1
20 TABLET, COATED ORAL DAILY
Qty: 90 TABLET | Refills: 1 | Status: SHIPPED | OUTPATIENT
Start: 2025-02-12

## 2025-02-12 RX ORDER — TRAZODONE HYDROCHLORIDE 50 MG/1
50 TABLET, FILM COATED ORAL NIGHTLY
Qty: 30 TABLET | Refills: 0 | Status: SHIPPED | OUTPATIENT
Start: 2025-02-12

## 2025-02-12 RX ORDER — ROPINIROLE 3 MG/1
3 TABLET, FILM COATED ORAL 2 TIMES DAILY
Qty: 180 TABLET | Refills: 1 | Status: SHIPPED | OUTPATIENT
Start: 2025-02-12

## 2025-02-12 RX ORDER — LOSARTAN POTASSIUM 100 MG/1
100 TABLET ORAL DAILY
Qty: 90 TABLET | Refills: 1 | Status: SHIPPED | OUTPATIENT
Start: 2025-02-12 | End: 2025-08-11

## 2025-02-12 RX ORDER — GABAPENTIN 400 MG/1
CAPSULE ORAL
Qty: 360 CAPSULE | Refills: 1 | Status: SHIPPED | OUTPATIENT
Start: 2025-02-12 | End: 2025-03-15

## 2025-02-12 SDOH — ECONOMIC STABILITY: FOOD INSECURITY: WITHIN THE PAST 12 MONTHS, THE FOOD YOU BOUGHT JUST DIDN'T LAST AND YOU DIDN'T HAVE MONEY TO GET MORE.: NEVER TRUE

## 2025-02-12 SDOH — ECONOMIC STABILITY: FOOD INSECURITY: WITHIN THE PAST 12 MONTHS, YOU WORRIED THAT YOUR FOOD WOULD RUN OUT BEFORE YOU GOT MONEY TO BUY MORE.: NEVER TRUE

## 2025-02-12 ASSESSMENT — PATIENT HEALTH QUESTIONNAIRE - PHQ9
2. FEELING DOWN, DEPRESSED OR HOPELESS: NOT AT ALL
1. LITTLE INTEREST OR PLEASURE IN DOING THINGS: NOT AT ALL
SUM OF ALL RESPONSES TO PHQ QUESTIONS 1-9: 0
SUM OF ALL RESPONSES TO PHQ9 QUESTIONS 1 & 2: 0
SUM OF ALL RESPONSES TO PHQ QUESTIONS 1-9: 0

## 2025-02-12 ASSESSMENT — ENCOUNTER SYMPTOMS
BLOOD IN STOOL: 0
SORE THROAT: 0
ABDOMINAL PAIN: 0
PHOTOPHOBIA: 0
NAUSEA: 1
DIARRHEA: 0
SHORTNESS OF BREATH: 0
COUGH: 0
VOMITING: 0
WHEEZING: 0
APNEA: 1

## 2025-02-12 NOTE — PROGRESS NOTES
Saskia Art (:  1955) is a 69 y.o. female,Established patient, here for evaluation of the following chief complaint(s):  Medication Refill and Labs Only      Assessment & Plan   ASSESSMENT/PLAN:  1. Hyperlipemia, mixed  -     rosuvastatin (CRESTOR) 20 MG tablet; Take 1 tablet by mouth daily Take 1 tab po daily with dinner, Disp-90 tablet, R-1Normal  -     Comprehensive Metabolic Panel; Future  -     Lipid Panel; Future  2. Type 2 diabetes mellitus with diabetic neuropathy, with long-term current use of insulin (HCC)  -     gabapentin (NEURONTIN) 400 MG capsule; Take 2 capsules po after breakfast and 2 capsules after dinner, Disp-360 capsule, R-1Normal  3. Urge incontinence  -     tolterodine (DETROL) 2 MG tablet; Take 1 tablet by mouth 2 times daily, Disp-180 tablet, R-1Normal  4. Essential (primary) hypertension  -     losartan (COZAAR) 100 MG tablet; Take 1 tablet by mouth daily, Disp-90 tablet, R-1Normal  -     CBC with Auto Differential; Future  -     Comprehensive Metabolic Panel; Future  5. Restless leg syndrome  -     rOPINIRole (REQUIP) 3 MG tablet; Take 1 tablet by mouth 2 times daily, Disp-180 tablet, R-1Normal  -     gabapentin (NEURONTIN) 400 MG capsule; Take 2 capsules po after breakfast and 2 capsules after dinner, Disp-360 capsule, R-1Normal  6. Major depressive disorder, recurrent, mild (HCC)  -     venlafaxine (EFFEXOR XR) 75 MG extended release capsule; Take 3 capsules po once daily, Disp-270 capsule, R-1Normal  7. ROWDY (generalized anxiety disorder)  -     venlafaxine (EFFEXOR XR) 75 MG extended release capsule; Take 3 capsules po once daily, Disp-270 capsule, R-1Normal  8. Psychophysiological insomnia  -     traZODone (DESYREL) 50 MG tablet; Take 1 tablet by mouth nightly, Disp-30 tablet, R-0Normal  9. Class 2 severe obesity due to excess calories with serious comorbidity and body mass index (BMI) of 39.0 to 39.9 in adult    RLS, Diabetic neuropathy- chronic, controlled, continue

## 2025-02-25 NOTE — PROGRESS NOTES
Name: Saskia Art  YOB: 1955  Gender: female  MRN: [id number]  Date of Encounter:  2/27/2025       CHIEF COMPLAINT:     Chief Complaint   Patient presents with    Follow-up     L Shoulder        SUBJECTIVE/OBJECTIVE:      HPI:    Patient is a 69 y.o. pleasant female who presents today for a return evaluation of her left shoulder.    Working diagnosis:   1. Biceps tendonitis on left    2. Chronic left shoulder pain       LOV: 2/6/2024     Recall hx:   Maryam has a hx of DM2, GERD, HTN, polyneuropathy, obesity. She presented in '23 for atraumatic shoulder pain. Pain she localizes to the anterior shoulder. If she tries to carry anything, including her purse. She has pain in her posterior shoulder, the bicep. She does not recall an injury, but she did start lifting wood with her  months ago. She denies neck pain or numbness.      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 Examination consistent with cuff tendinosis and biceps tendinosis.      We performed a LGHJ injection, which gave her good pain relief for around a month, then pain returned. She has pain with sleep the most, and lifting objects. There was no new injury. She cannot take oral NSAIDs and tylenol isnt helpful. She has tried diclofenac. She has no interest in surgery, however.      10/10/23: discussed obtaining MR imaging, but she wished to repeat injection. repeat GHJ injection performed  2/6/24: last injection gave her good relief, almost 4 months. She has had some recent increased soreness the last 3 weeks. GHI performed today.    2/26/25: She reports that her previous injection provided significant relief for several months. However, she has been experiencing increased shoulder pain recently. She has not had any new injuries or falls. The pain is localized to the back of her shoulder and is exacerbated by movement, preventing her from lifting objects. She does not report any associated catching,

## 2025-02-26 ENCOUNTER — OFFICE VISIT (OUTPATIENT)
Dept: ORTHOPEDIC SURGERY | Age: 70
End: 2025-02-26

## 2025-02-26 DIAGNOSIS — G89.29 CHRONIC LEFT SHOULDER PAIN: ICD-10-CM

## 2025-02-26 DIAGNOSIS — M75.22 BICEPS TENDONITIS ON LEFT: Primary | ICD-10-CM

## 2025-02-26 DIAGNOSIS — M25.512 CHRONIC LEFT SHOULDER PAIN: ICD-10-CM

## 2025-02-26 RX ORDER — METHYLPREDNISOLONE ACETATE 80 MG/ML
80 INJECTION, SUSPENSION INTRA-ARTICULAR; INTRALESIONAL; INTRAMUSCULAR; SOFT TISSUE ONCE
Status: COMPLETED | OUTPATIENT
Start: 2025-02-26 | End: 2025-02-26

## 2025-02-26 RX ADMIN — METHYLPREDNISOLONE ACETATE 80 MG: 80 INJECTION, SUSPENSION INTRA-ARTICULAR; INTRALESIONAL; INTRAMUSCULAR; SOFT TISSUE at 09:50

## 2025-03-04 SDOH — ECONOMIC STABILITY: INCOME INSECURITY: IN THE LAST 12 MONTHS, WAS THERE A TIME WHEN YOU WERE NOT ABLE TO PAY THE MORTGAGE OR RENT ON TIME?: NO

## 2025-03-04 SDOH — ECONOMIC STABILITY: FOOD INSECURITY: WITHIN THE PAST 12 MONTHS, THE FOOD YOU BOUGHT JUST DIDN'T LAST AND YOU DIDN'T HAVE MONEY TO GET MORE.: NEVER TRUE

## 2025-03-04 SDOH — ECONOMIC STABILITY: FOOD INSECURITY: WITHIN THE PAST 12 MONTHS, YOU WORRIED THAT YOUR FOOD WOULD RUN OUT BEFORE YOU GOT MONEY TO BUY MORE.: NEVER TRUE

## 2025-03-04 SDOH — ECONOMIC STABILITY: TRANSPORTATION INSECURITY
IN THE PAST 12 MONTHS, HAS THE LACK OF TRANSPORTATION KEPT YOU FROM MEDICAL APPOINTMENTS OR FROM GETTING MEDICATIONS?: NO

## 2025-03-05 ENCOUNTER — TELEMEDICINE (OUTPATIENT)
Dept: FAMILY MEDICINE CLINIC | Facility: CLINIC | Age: 70
End: 2025-03-05

## 2025-03-05 DIAGNOSIS — F51.04 PSYCHOPHYSIOLOGICAL INSOMNIA: ICD-10-CM

## 2025-03-05 DIAGNOSIS — F33.0 MAJOR DEPRESSIVE DISORDER, RECURRENT, MILD: Primary | ICD-10-CM

## 2025-03-05 DIAGNOSIS — F41.1 GAD (GENERALIZED ANXIETY DISORDER): ICD-10-CM

## 2025-03-05 RX ORDER — MIRTAZAPINE 7.5 MG/1
7.5 TABLET, FILM COATED ORAL NIGHTLY
Qty: 30 TABLET | Refills: 0 | Status: SHIPPED | OUTPATIENT
Start: 2025-03-05

## 2025-03-05 ASSESSMENT — PATIENT HEALTH QUESTIONNAIRE - PHQ9
SUM OF ALL RESPONSES TO PHQ QUESTIONS 1-9: 0
1. LITTLE INTEREST OR PLEASURE IN DOING THINGS: NOT AT ALL
2. FEELING DOWN, DEPRESSED OR HOPELESS: NOT AT ALL

## 2025-03-05 ASSESSMENT — ENCOUNTER SYMPTOMS
VOMITING: 0
ABDOMINAL PAIN: 0
SHORTNESS OF BREATH: 0
NAUSEA: 0
COUGH: 0
WHEEZING: 0
DIARRHEA: 0
SINUS PAIN: 0

## 2025-03-05 NOTE — ASSESSMENT & PLAN NOTE
Chronic, well controlled, continue Effexor XR; start Remeron at HS.      Orders:    mirtazapine (REMERON) 7.5 MG tablet; Take 1 tablet by mouth nightly

## 2025-03-05 NOTE — PROGRESS NOTES
Saskia Art, was evaluated through a synchronous (real-time) audio-video encounter. The patient (or guardian if applicable) is aware that this is a billable service, which includes applicable co-pays. This Virtual Visit was conducted with patient's (and/or legal guardian's) consent. Patient identification was verified, and a caregiver was present when appropriate.   The patient was located at Home: [address] [place] [id number]  Provider was located at Facility (Appt Dept): [address] [place] [address],  [place] [address]  Confirm you are appropriately licensed, registered, or certified to deliver care in the state where the patient is located as indicated above. If you are not or unsure, please re-schedule the visit: Yes, I confirm.     Saskia Art (:  1955) is a Established patient, presenting virtually for evaluation of the following:      Below is the assessment and plan developed based on review of pertinent history, physical exam, labs, studies, and medications.     Assessment & Plan  Major depressive disorder, recurrent, mild  Chronic, well controlled, continue Effexor XR          ROWDY (generalized anxiety disorder)     Chronic, well controlled, continue Effexor XR; start Remeron at HS.      Orders:    mirtazapine (REMERON) 7.5 MG tablet; Take 1 tablet by mouth nightly    Psychophysiological insomnia   Chronic, not at goal (unstable), encouraged to walk longer and to do it daily with her , to shower after that but not close to bedtime; discussed good sleep hygiene.  Advised to DC Trazodone, start Mirtazapine 7.5 mg at HS- discussed possible side effects including weight gain.    Orders:    mirtazapine (REMERON) 7.5 MG tablet; Take 1 tablet by mouth nightly      Return in about 2 weeks (around 3/19/2025) for f/u insomnia, ROWDY- virtual.       Subjective   Insomnia  Pertinent negatives include no abdominal pain, chest pain, chills, congestion, coughing, fever, nausea

## 2025-03-05 NOTE — ASSESSMENT & PLAN NOTE
Chronic, not at goal (unstable), encouraged to walk longer and to do it daily with her , to shower after that but not close to bedtime; discussed good sleep hygiene.  Advised to DC Trazodone, start Mirtazapine 7.5 mg at HS- discussed possible side effects including weight gain.    Orders:    mirtazapine (REMERON) 7.5 MG tablet; Take 1 tablet by mouth nightly

## 2025-04-14 ENCOUNTER — TELEMEDICINE (OUTPATIENT)
Dept: FAMILY MEDICINE CLINIC | Facility: CLINIC | Age: 70
End: 2025-04-14

## 2025-04-14 DIAGNOSIS — Z00.00 MEDICARE ANNUAL WELLNESS VISIT, SUBSEQUENT: Primary | ICD-10-CM

## 2025-04-14 ASSESSMENT — PATIENT HEALTH QUESTIONNAIRE - PHQ9
5. POOR APPETITE OR OVEREATING: NOT AT ALL
8. MOVING OR SPEAKING SO SLOWLY THAT OTHER PEOPLE COULD HAVE NOTICED. OR THE OPPOSITE, BEING SO FIGETY OR RESTLESS THAT YOU HAVE BEEN MOVING AROUND A LOT MORE THAN USUAL: NOT AT ALL
9. THOUGHTS THAT YOU WOULD BE BETTER OFF DEAD, OR OF HURTING YOURSELF: NOT AT ALL
1. LITTLE INTEREST OR PLEASURE IN DOING THINGS: NOT AT ALL
3. TROUBLE FALLING OR STAYING ASLEEP: NOT AT ALL
6. FEELING BAD ABOUT YOURSELF - OR THAT YOU ARE A FAILURE OR HAVE LET YOURSELF OR YOUR FAMILY DOWN: NOT AT ALL
SUM OF ALL RESPONSES TO PHQ QUESTIONS 1-9: 0
SUM OF ALL RESPONSES TO PHQ QUESTIONS 1-9: 0
10. IF YOU CHECKED OFF ANY PROBLEMS, HOW DIFFICULT HAVE THESE PROBLEMS MADE IT FOR YOU TO DO YOUR WORK, TAKE CARE OF THINGS AT HOME, OR GET ALONG WITH OTHER PEOPLE: NOT DIFFICULT AT ALL
SUM OF ALL RESPONSES TO PHQ QUESTIONS 1-9: 0
2. FEELING DOWN, DEPRESSED OR HOPELESS: NOT AT ALL
SUM OF ALL RESPONSES TO PHQ QUESTIONS 1-9: 0
4. FEELING TIRED OR HAVING LITTLE ENERGY: NOT AT ALL
7. TROUBLE CONCENTRATING ON THINGS, SUCH AS READING THE NEWSPAPER OR WATCHING TELEVISION: NOT AT ALL

## 2025-04-14 NOTE — PROGRESS NOTES
Take 1 tablet by mouth 2 times daily Yes Lillian Huang MD   rosuvastatin (CRESTOR) 20 MG tablet Take 1 tablet by mouth daily Take 1 tab po daily with dinner Yes Lillian Huang MD   venlafaxine (EFFEXOR XR) 75 MG extended release capsule Take 3 capsules po once daily Yes Lillian Huang MD   tolterodine (DETROL) 2 MG tablet Take 1 tablet by mouth 2 times daily Yes Lillian Huang MD   BD INSULIN SYRINGE U/F 31G X 5/16\" 1 ML MISC  Yes ProviderCody MD   Semaglutide,0.25 or 0.5MG/DOS, (OZEMPIC, 0.25 OR 0.5 MG/DOSE,) 2 MG/1.5ML SOPN Inject into the skin Yes ProviderCody MD   albuterol sulfate HFA (PROVENTIL;VENTOLIN;PROAIR) 108 (90 Base) MCG/ACT inhaler Inhale 2 puffs into the lungs every 6 hours as needed for Wheezing Yes Lillian Huang MD   fluticasone-salmeterol (ADVAIR HFA) 230-21 MCG/ACT inhaler Inhale 2 puffs into the lungs 2 times daily Yes Lillian Huang MD   metFORMIN (GLUCOPHAGE) 500 MG tablet  Yes ProviderCody MD   famotidine (PEPCID) 40 MG tablet Take 1 tablet by mouth 2 times daily Yes Lillian Huang MD   glipiZIDE (GLUCOTROL) 5 MG tablet Take 1 tablet by mouth 2 times daily Yes Automatic Reconciliation, Ar   insulin NPH (HUMULIN N;NOVOLIN N) 100 UNIT/ML injection vial Inject into the skin once Yes Automatic Reconciliation, Ar   insulin regular (HUMULIN R;NOVOLIN R) 100 UNIT/ML injection 30 units sub-cu TID per sliding scale Yes Automatic Reconciliation, Ar   mirtazapine (REMERON) 7.5 MG tablet Take 1 tablet by mouth nightly  Patient not taking: Reported on 4/14/2025  Lillian Huang MD   gabapentin (NEURONTIN) 400 MG capsule Take 2 capsules po after breakfast and 2 capsules after dinner  Lillian Huang MD       [facility] (Including outside providers/suppliers regularly involved in providing care):   Patient Care Team:  Lillian Huang MD as PCP - General  Lillian Huang MD as PCP - Empaneled Provider     Recommendations for Preventive

## 2025-04-14 NOTE — ACP (ADVANCE CARE PLANNING)
Date of ACP Conversation:4/14/25   Persons included in Conversation: Patient  Length of ACP Conversation in minutes: 5 minutes    Authorized Decision Maker (if patient is incapable of making informed decisions): NA          For Patients with Decision Making Capacity:   Patient has a Living Will, Advance Directives and Healthcare Power of .      Conversation Outcomes / Follow-Up Plan:   Patient does have a living will, advance directive and a healthcare POA. Advised patient to bring a copy of the same to their chart - patient understands and agrees.

## 2025-04-21 NOTE — TELEPHONE ENCOUNTER
Nicole and [facility] would like 100 day supply of rosuvastatin 20mg for the Medicare 100day rule [FreeTextEntry1] : Case discussed with Dr. Cuevas RTC in 4 months for LAZARO Kidd  Internal Medicine PGY-2 Firm 2  Eyes with no visual disturbances.  Ears clean and dry and no hearing difficulties. Nose with pink mucosa and no drainage.  Mouth mucous membranes moist and pink.  No tenderness or swelling to throat or neck.

## 2025-06-05 ENCOUNTER — RESULTS FOLLOW-UP (OUTPATIENT)
Dept: FAMILY MEDICINE CLINIC | Facility: CLINIC | Age: 70
End: 2025-06-05

## 2025-07-03 ENCOUNTER — RESULTS FOLLOW-UP (OUTPATIENT)
Dept: FAMILY MEDICINE CLINIC | Facility: CLINIC | Age: 70
End: 2025-07-03

## 2025-08-12 ENCOUNTER — OFFICE VISIT (OUTPATIENT)
Dept: FAMILY MEDICINE CLINIC | Facility: CLINIC | Age: 70
End: 2025-08-12

## 2025-08-12 VITALS
RESPIRATION RATE: 16 BRPM | HEART RATE: 90 BPM | OXYGEN SATURATION: 96 % | DIASTOLIC BLOOD PRESSURE: 80 MMHG | HEIGHT: 67 IN | WEIGHT: 247.5 LBS | BODY MASS INDEX: 38.84 KG/M2 | TEMPERATURE: 98 F | SYSTOLIC BLOOD PRESSURE: 128 MMHG

## 2025-08-12 DIAGNOSIS — E66.01 CLASS 2 SEVERE OBESITY DUE TO EXCESS CALORIES WITH SERIOUS COMORBIDITY AND BODY MASS INDEX (BMI) OF 38.0 TO 38.9 IN ADULT (HCC): ICD-10-CM

## 2025-08-12 DIAGNOSIS — F41.1 GAD (GENERALIZED ANXIETY DISORDER): Chronic | ICD-10-CM

## 2025-08-12 DIAGNOSIS — E11.40 TYPE 2 DIABETES MELLITUS WITH DIABETIC NEUROPATHY, WITH LONG-TERM CURRENT USE OF INSULIN (HCC): Primary | Chronic | ICD-10-CM

## 2025-08-12 DIAGNOSIS — E66.812 CLASS 2 SEVERE OBESITY DUE TO EXCESS CALORIES WITH SERIOUS COMORBIDITY AND BODY MASS INDEX (BMI) OF 38.0 TO 38.9 IN ADULT (HCC): ICD-10-CM

## 2025-08-12 DIAGNOSIS — I10 ESSENTIAL (PRIMARY) HYPERTENSION: Chronic | ICD-10-CM

## 2025-08-12 DIAGNOSIS — Z79.4 TYPE 2 DIABETES MELLITUS WITH DIABETIC NEUROPATHY, WITH LONG-TERM CURRENT USE OF INSULIN (HCC): Primary | Chronic | ICD-10-CM

## 2025-08-12 DIAGNOSIS — E78.2 HYPERLIPEMIA, MIXED: Chronic | ICD-10-CM

## 2025-08-12 DIAGNOSIS — F33.0 MAJOR DEPRESSIVE DISORDER, RECURRENT, MILD: Chronic | ICD-10-CM

## 2025-08-12 DIAGNOSIS — G25.81 RESTLESS LEG SYNDROME: Chronic | ICD-10-CM

## 2025-08-12 RX ORDER — LOSARTAN POTASSIUM 100 MG/1
100 TABLET ORAL DAILY
Qty: 90 TABLET | Refills: 1 | Status: SHIPPED | OUTPATIENT
Start: 2025-08-12 | End: 2026-02-08

## 2025-08-12 RX ORDER — ROPINIROLE 3 MG/1
3 TABLET, FILM COATED ORAL 2 TIMES DAILY
Qty: 180 TABLET | Refills: 1 | Status: SHIPPED | OUTPATIENT
Start: 2025-08-12

## 2025-08-12 RX ORDER — VENLAFAXINE HYDROCHLORIDE 75 MG/1
CAPSULE, EXTENDED RELEASE ORAL
Qty: 270 CAPSULE | Refills: 1 | Status: SHIPPED | OUTPATIENT
Start: 2025-08-12

## 2025-08-12 RX ORDER — ROSUVASTATIN CALCIUM 20 MG/1
20 TABLET, COATED ORAL DAILY
Qty: 90 TABLET | Refills: 1 | Status: SHIPPED | OUTPATIENT
Start: 2025-08-12

## 2025-08-12 RX ORDER — GABAPENTIN 400 MG/1
CAPSULE ORAL
Qty: 360 CAPSULE | Refills: 1 | Status: SHIPPED | OUTPATIENT
Start: 2025-08-12 | End: 2026-02-09

## 2025-08-12 RX ORDER — HYDROCHLOROTHIAZIDE 12.5 MG/1
CAPSULE ORAL
COMMUNITY
Start: 2025-05-28

## 2025-08-12 SDOH — ECONOMIC STABILITY: FOOD INSECURITY: WITHIN THE PAST 12 MONTHS, YOU WORRIED THAT YOUR FOOD WOULD RUN OUT BEFORE YOU GOT MONEY TO BUY MORE.: NEVER TRUE

## 2025-08-12 SDOH — ECONOMIC STABILITY: FOOD INSECURITY: WITHIN THE PAST 12 MONTHS, THE FOOD YOU BOUGHT JUST DIDN'T LAST AND YOU DIDN'T HAVE MONEY TO GET MORE.: NEVER TRUE

## 2025-08-12 ASSESSMENT — PATIENT HEALTH QUESTIONNAIRE - PHQ9
2. FEELING DOWN, DEPRESSED OR HOPELESS: NOT AT ALL
SUM OF ALL RESPONSES TO PHQ QUESTIONS 1-9: 0
SUM OF ALL RESPONSES TO PHQ QUESTIONS 1-9: 0
1. LITTLE INTEREST OR PLEASURE IN DOING THINGS: NOT AT ALL
SUM OF ALL RESPONSES TO PHQ QUESTIONS 1-9: 0
SUM OF ALL RESPONSES TO PHQ QUESTIONS 1-9: 0

## 2025-08-13 LAB
ALBUMIN SERPL-MCNC: 3.4 G/DL (ref 3.2–4.6)
ALBUMIN/GLOB SERPL: 1.1 (ref 1–1.9)
ALP SERPL-CCNC: 171 U/L (ref 35–104)
ALT SERPL-CCNC: 12 U/L (ref 8–45)
ANION GAP SERPL CALC-SCNC: 12 MMOL/L (ref 7–16)
AST SERPL-CCNC: 10 U/L (ref 15–37)
BASOPHILS # BLD: 0.02 K/UL (ref 0–0.2)
BASOPHILS NFR BLD: 0.3 % (ref 0–2)
BILIRUB SERPL-MCNC: <0.2 MG/DL (ref 0–1.2)
BUN SERPL-MCNC: 12 MG/DL (ref 8–23)
CALCIUM SERPL-MCNC: 9.3 MG/DL (ref 8.8–10.2)
CHLORIDE SERPL-SCNC: 103 MMOL/L (ref 98–107)
CHOLEST SERPL-MCNC: 136 MG/DL (ref 0–200)
CO2 SERPL-SCNC: 27 MMOL/L (ref 20–29)
CREAT SERPL-MCNC: 0.72 MG/DL (ref 0.6–1.1)
DIFFERENTIAL METHOD BLD: ABNORMAL
EOSINOPHIL # BLD: 0.12 K/UL (ref 0–0.8)
EOSINOPHIL NFR BLD: 1.8 % (ref 0.5–7.8)
ERYTHROCYTE [DISTWIDTH] IN BLOOD BY AUTOMATED COUNT: 15.4 % (ref 11.9–14.6)
GLOBULIN SER CALC-MCNC: 3 G/DL (ref 2.3–3.5)
GLUCOSE SERPL-MCNC: 84 MG/DL (ref 70–99)
HCT VFR BLD AUTO: 40.7 % (ref 35.8–46.3)
HDLC SERPL-MCNC: 43 MG/DL (ref 40–60)
HDLC SERPL: 3.2 (ref 0–5)
HGB BLD-MCNC: 12.3 G/DL (ref 11.7–15.4)
IMM GRANULOCYTES # BLD AUTO: 0.02 K/UL (ref 0–0.5)
IMM GRANULOCYTES NFR BLD AUTO: 0.3 % (ref 0–5)
LDLC SERPL CALC-MCNC: 70 MG/DL (ref 0–100)
LYMPHOCYTES # BLD: 1.52 K/UL (ref 0.5–4.6)
LYMPHOCYTES NFR BLD: 23.2 % (ref 13–44)
MCH RBC QN AUTO: 26 PG (ref 26.1–32.9)
MCHC RBC AUTO-ENTMCNC: 30.2 G/DL (ref 31.4–35)
MCV RBC AUTO: 86 FL (ref 82–102)
MONOCYTES # BLD: 0.39 K/UL (ref 0.1–1.3)
MONOCYTES NFR BLD: 5.9 % (ref 4–12)
NEUTS SEG # BLD: 4.49 K/UL (ref 1.7–8.2)
NEUTS SEG NFR BLD: 68.5 % (ref 43–78)
NRBC # BLD: 0 K/UL (ref 0–0.2)
PLATELET # BLD AUTO: 206 K/UL (ref 150–450)
PMV BLD AUTO: 10.6 FL (ref 9.4–12.3)
POTASSIUM SERPL-SCNC: 3.9 MMOL/L (ref 3.5–5.1)
PROT SERPL-MCNC: 6.4 G/DL (ref 6.3–8.2)
RBC # BLD AUTO: 4.73 M/UL (ref 4.05–5.2)
SODIUM SERPL-SCNC: 142 MMOL/L (ref 136–145)
TRIGL SERPL-MCNC: 115 MG/DL (ref 0–150)
VLDLC SERPL CALC-MCNC: 23 MG/DL (ref 6–23)
WBC # BLD AUTO: 6.6 K/UL (ref 4.3–11.1)

## 2025-08-14 LAB
ESTIMATED AVERAGE GLUCOSE: NORMAL
HBA1C MFR BLD: 8.5 %

## 2025-08-19 ENCOUNTER — COMMUNITY OUTREACH (OUTPATIENT)
Dept: FAMILY MEDICINE CLINIC | Facility: CLINIC | Age: 70
End: 2025-08-19

## (undated) DEVICE — SOLUTION IV 1000ML 0.9% SOD CHL

## (undated) DEVICE — SUTURE ETHLN SZ 4-0 L18IN NONABSORBABLE BLK L19MM PS-2 3/8 1667H

## (undated) DEVICE — NEEDLE HYPO 25GA L1.5IN BLU POLYPR HUB S STL REG BVL STR

## (undated) DEVICE — SUT PROL 3-0 18IN PS2 BLU --

## (undated) DEVICE — PADDING CAST COHESIVE 4 YDX3 IN HND TEARABLE COTTON SPEC 100

## (undated) DEVICE — SPLINT MAT XF SPEC 5X30IN --

## (undated) DEVICE — GARMENT,MEDLINE,DVT,INT,CALF,FOAM,MED: Brand: MEDLINE

## (undated) DEVICE — DRAPE, FILM SHEET, 44X65 STERILE: Brand: MEDLINE

## (undated) DEVICE — SLING ARM AD ULT

## (undated) DEVICE — PREP SKN CHLRAPRP APL 26ML STR --

## (undated) DEVICE — BNDG ELAS ESMARK 4INX12FT LF -- STRL

## (undated) DEVICE — SUT PROL 5-0 18IN P3 BLU --

## (undated) DEVICE — STERILE HOOK LOCK LATEX FREE ELASTIC BANDAGE 3INX5YD: Brand: HOOK LOCK™

## (undated) DEVICE — SURGICAL PROCEDURE PACK BASIC ST FRANCIS

## (undated) DEVICE — OCCLUSIVE GAUZE STRIP,3% BISMUTH TRIBROMOPHENATE IN PETROLATUM BLEND: Brand: XEROFORM

## (undated) DEVICE — STERILE HOOK LOCK LATEX FREE ELASTIC BANDAGE 2INX5YD: Brand: HOOK LOCK™

## (undated) DEVICE — SUT CHRMC 5-0 27IN RB1 BRN --

## (undated) DEVICE — SUTURE SUPRAMID SZ 3-0 L12IN NONABSORBABLE WHT 3/8 CIR TAPR HEA30

## (undated) DEVICE — DISPOSABLE BIPOLAR CODE, 12' (3.66 M): Brand: CONMED

## (undated) DEVICE — ZIMMER® STERILE DISPOSABLE TOURNIQUET CUFF WITH PLC, DUAL PORT, SINGLE BLADDER, 18 IN. (46 CM)

## (undated) DEVICE — DRSG GZ XEROFORM CUR 4X4 --

## (undated) DEVICE — DRAPE,HAND,STERILE: Brand: MEDLINE

## (undated) DEVICE — SPLINT ORTH W3XL15IN PLSTR OF PARIS LO EXOTHERM SMOOTH

## (undated) DEVICE — STRETCH BANDAGE ROLL: Brand: DERMACEA

## (undated) DEVICE — PADDING CAST W2INXL4YD ST COT COHESIVE HND TEARABLE SPEC